# Patient Record
Sex: FEMALE | Race: WHITE | Employment: FULL TIME | URBAN - NONMETROPOLITAN AREA
[De-identification: names, ages, dates, MRNs, and addresses within clinical notes are randomized per-mention and may not be internally consistent; named-entity substitution may affect disease eponyms.]

---

## 2017-11-09 PROBLEM — M17.12 PRIMARY OSTEOARTHRITIS OF LEFT KNEE: Status: ACTIVE | Noted: 2017-11-09

## 2017-11-17 PROBLEM — Z96.652 STATUS POST LEFT KNEE REPLACEMENT: Status: ACTIVE | Noted: 2017-11-17

## 2017-11-17 PROBLEM — M17.12 OSTEOARTHRITIS OF LEFT KNEE: Status: ACTIVE | Noted: 2017-11-17

## 2017-11-17 PROBLEM — M17.12 PRIMARY OSTEOARTHRITIS OF LEFT KNEE: Status: RESOLVED | Noted: 2017-11-09 | Resolved: 2017-11-17

## 2018-09-28 ENCOUNTER — OFFICE VISIT (OUTPATIENT)
Dept: SURGERY | Age: 73
End: 2018-09-28
Payer: MEDICARE

## 2018-09-28 VITALS
WEIGHT: 154 LBS | TEMPERATURE: 98.3 F | HEART RATE: 74 BPM | HEIGHT: 64 IN | DIASTOLIC BLOOD PRESSURE: 88 MMHG | BODY MASS INDEX: 26.29 KG/M2 | OXYGEN SATURATION: 98 % | SYSTOLIC BLOOD PRESSURE: 175 MMHG

## 2018-09-28 DIAGNOSIS — R14.0 ABDOMINAL BLOATING WITH CRAMPS: Primary | ICD-10-CM

## 2018-09-28 DIAGNOSIS — R10.9 ABDOMINAL BLOATING WITH CRAMPS: Primary | ICD-10-CM

## 2018-09-28 PROCEDURE — 99204 OFFICE O/P NEW MOD 45 MIN: CPT | Performed by: SURGERY

## 2018-09-28 RX ORDER — VENLAFAXINE HYDROCHLORIDE 37.5 MG/1
37.5 CAPSULE, EXTENDED RELEASE ORAL DAILY
COMMUNITY
End: 2019-10-10 | Stop reason: SDUPTHER

## 2018-09-28 RX ORDER — LEVOTHYROXINE SODIUM 0.12 MG/1
125 TABLET ORAL DAILY
COMMUNITY
End: 2019-08-29 | Stop reason: SDUPTHER

## 2018-09-28 RX ORDER — PILOCARPINE HYDROCHLORIDE 7.5 MG/1
7.5 TABLET, FILM COATED ORAL 3 TIMES DAILY
COMMUNITY
End: 2019-12-13 | Stop reason: SDUPTHER

## 2018-09-28 NOTE — PROGRESS NOTES
morning. Once about a few months ago, she woke up with loose stool in underwear and was not sure if that was isolated episode or not. She says sometimes when she has to urinate, She feels like she does not have any feeling while she is going. She denies any melena or hematochezia in her stools. In the last few months since this started, She had a weight loss about 10 pounds. There is no family history of Crohn's or ulcerative colitis. She had aMaternal aunt with colon cancer in her 76s. She is a local . .       Past Medical History:   Diagnosis Date    Arrhythmia     Atrial Fibrillation , Reentrant AV elfego tachycardia, radiofrequency ablation 1999 Tammy Vargas  / follow with Dr. Wilmer Owusu every year    AVNRT (AV elfego re-entry tachycardia) ([facility] [address]     s/p ablation Tammy Vargas    Dry eyes     Dry mouth     GERD (gastroesophageal reflux disease)     Glaucoma     left eyes    Hypothyroidism     Osteoarthritis     Sjogren's syndrome ([facility] [address]     Spinal stenosis     at lower back       Past Surgical History:   Procedure Laterality Date    ABLATION OF DYSRHYTHMIC FOCUS      APPENDECTOMY  ?   SECTION      x 2    COLONOSCOPY      JOINT REPLACEMENT Left 2017    total knee       Prior to Admission medications    Medication Sig Start Date End Date Taking?  Authorizing Provider   venlafaxine (EFFEXOR XR) 37.5 MG extended release capsule Take 37.5 mg by mouth daily   Yes Historical Provider, MD   metoprolol tartrate (LOPRESSOR) 25 MG tablet Take 25 mg by mouth 2 times daily Instructed to take with sip water am of procedure   taking for heart per patient   Yes Historical Provider, MD   Calcium Carb-Cholecalciferol (CALCIUM 1000 + D PO) Take 4 tablets by mouth 2 times daily LD 2017   Yes Historical Provider, MD   Biotin (BIOTIN 5000) 5 MG CAPS Take by mouth daily LD 2017   Yes Historical Provider, MD   Omega-3 Fatty Acids (OMEGA-3 FISH OIL PO) Take 1 capsule by Mother     Heart Disease Father     No Known Problems Brother        Review of Systems   All other systems reviewed and are negative. Objective:  Vitals:    09/28/18 0906   BP: (!) 175/88   Site: Right Upper Arm   Position: Sitting   Cuff Size: Medium Adult   Pulse: 74   Temp: 98.3 °F (36.8 °C)   TempSrc: Oral   SpO2: 98%   Weight: 154 lb (69.9 kg)   Height: 5' 4\" (1.626 m)          Physical Exam   Constitutional: She is oriented to person, place, and time and well-developed, well-nourished, and in no distress. HENT:   Head: Normocephalic and atraumatic. Eyes: Right eye exhibits no discharge. Left eye exhibits no discharge. Neck: No tracheal deviation present. Cardiovascular: Normal rate. Pulmonary/Chest: Effort normal. No respiratory distress. Abdominal: Soft. She exhibits no distension. There is no tenderness. There is no rebound and no guarding. Neurological: She is alert and oriented to person, place, and time. Skin: Skin is warm and dry. Ulices Pedroza MD  9/28/2018    NOTE: This report, in part or full, may have been transcribed using voice recognition software. Every effort was made to ensure accuracy; however, inadvertent computerized transcription errors may be present. Please excuse any transcriptional grammatical or spelling errors that may have escaped my editorial review.

## 2018-09-28 NOTE — PATIENT INSTRUCTIONS
wants you to do.     · Your doctor will tell you which medicines to take or stop before your procedure. You may need to stop taking certain medicines a week or more before the procedure. So talk to your doctor as soon as you can.     · If you have an advance directive, let your doctor know. It may include a living will and a durable power of  for health care. Bring a copy to the hospital. If you don't have one, you may want to prepare one. It lets your doctor and loved ones know your health care wishes. Doctors advise that everyone prepare these papers before any type of surgery or procedure. Procedures can be stressful. This information will help you understand what you can expect. And it will help you safely prepare for your procedure. What happens on the day of the procedure? · Follow the instructions exactly about when to stop eating and drinking. If you don't, your procedure may be canceled. If your doctor told you to take your medicines on the day of the procedure, take them with only a sip of water.     · Take a bath or shower before you come in for your procedure. Do not apply lotions, perfumes, deodorants, or nail polish.     · Take off all jewelry and piercings. And take out contact lenses, if you wear them.    At the hospital or surgery center   · Bring a picture ID.     · The test may take 15 to 30 minutes.     · The doctor may spray medicine on the back of your throat to numb it. You also will get medicine to prevent pain and to relax you.     · You will lie on your left side. The doctor will put the scope in your mouth and toward the back of your throat. The doctor will tell you when to swallow. This helps the scope move down your throat. You will be able to breathe normally. The doctor will move the scope down your esophagus into your stomach.  The doctor also may look at the duodenum.     · If your doctor wants to take a sample of tissue for a biopsy, he or she may use small surgical tools, which are put into the scope, to cut off some tissue. You will not feel a biopsy, if one is taken. The doctor also can use the tools to stop bleeding or to do other treatments, if needed.     · You will stay at the hospital or surgery center for 1 to 2 hours until the medicine you were given wears off. Going home   · Be sure you have someone to drive you home. Anesthesia and pain medicine make it unsafe for you to drive.     · You will be given more specific instructions about recovering from your procedure. They will cover things like diet, wound care, follow-up care, driving, and getting back to your normal routine. When should you call your doctor? · You have questions or concerns.     · You don't understand how to prepare for your procedure.     · You become ill before the procedure (such as fever, flu, or a cold).     · You need to reschedule or have changed your mind about having the procedure. Where can you learn more? Go to [email or web address] and sign in to your ContestMachine account. Enter P790 in the InteliCloud box to learn more about \"Upper GI Endoscopy: Before Your Procedure. \"     If you do not have an account, please click on the \"Sign Up Now\" link. Current as of: May 12, 2017  Content Version: 11.7  © 0775-6948 [employer]. Care instructions adapted under license by [facility] ([facility]). If you have questions about a medical condition or this instruction, always ask your healthcare professional. [place] [address] any warranty or liability for your use of this information. Patient Education        Colonoscopy: Before Your Procedure  What is a colonoscopy? A colonoscopy is a test that lets a doctor look inside your colon. The doctor uses a thin, lighted tube called a colonoscope to look for problems. These include small growths called polyps, cancer, or bleeding.   During the test, the doctor can take samples of tissue that can longer. It depends on what is found and what is done. Going home   · Be sure you have someone to drive you home. Anesthesia and pain medicine make it unsafe for you to drive.     · You will be given more specific instructions about recovering from your procedure. When should you call your doctor? · You have questions or concerns.     · You don't understand how to prepare for your procedure.     · You are having trouble with the bowel prep.     · You become ill before the procedure (such as fever, flu, or a cold).     · You need to reschedule or have changed your mind about having the procedure. Where can you learn more? Go to [email or web address].[email or web address] org and sign in to your Antengo account. Enter C315 in the Qyer.com box to learn more about \"Colonoscopy: Before Your Procedure. \"     If you do not have an account, please click on the \"Sign Up Now\" link. Current as of: May 12, 2017  Content Version: 11.7  © 7080-0388 [employer]. Care instructions adapted under license by [facility] ([facility]). If you have questions about a medical condition or this instruction, always ask your healthcare professional. [place] [address] any warranty or liability for your use of this information. Patient Education        Gas and Bloating: Care Instructions  Your Care Instructions    Gas and bloating can be uncomfortable and embarrassing problems. All people pass gas, but some people produce more gas than others, sometimes enough to cause distress. It is normal to pass gas from 6 to 20 times per day. Excess gas usually is not caused by a serious health problem. Gas and bloating usually are caused by something you eat or drink, including some food supplements and medicines. Gas and bloating are usually harmless and go away without treatment. However, changing your diet can help end the problem.  Some over-the-counter medicines can help prevent gas and relieve

## 2018-10-02 ENCOUNTER — TELEPHONE (OUTPATIENT)
Dept: SURGERY | Age: 73
End: 2018-10-02

## 2018-10-05 ENCOUNTER — ANESTHESIA (OUTPATIENT)
Dept: ENDOSCOPY | Age: 73
End: 2018-10-05
Payer: MEDICARE

## 2018-10-05 ENCOUNTER — ANESTHESIA EVENT (OUTPATIENT)
Dept: ENDOSCOPY | Age: 73
End: 2018-10-05
Payer: MEDICARE

## 2018-10-05 ENCOUNTER — HOSPITAL ENCOUNTER (OUTPATIENT)
Age: 73
Setting detail: OUTPATIENT SURGERY
Discharge: HOME | End: 2018-10-05
Attending: SURGERY | Admitting: SURGERY
Payer: MEDICARE

## 2018-10-05 VITALS — OXYGEN SATURATION: 97 % | SYSTOLIC BLOOD PRESSURE: 130 MMHG | DIASTOLIC BLOOD PRESSURE: 76 MMHG

## 2018-10-05 VITALS
OXYGEN SATURATION: 99 % | DIASTOLIC BLOOD PRESSURE: 70 MMHG | SYSTOLIC BLOOD PRESSURE: 158 MMHG | HEIGHT: 64 IN | RESPIRATION RATE: 18 BRPM | TEMPERATURE: 97.3 F | WEIGHT: 153 LBS | BODY MASS INDEX: 26.12 KG/M2 | HEART RATE: 60 BPM

## 2018-10-05 PROCEDURE — 7100000010 HC PHASE II RECOVERY - FIRST 15 MIN: Performed by: SURGERY

## 2018-10-05 PROCEDURE — 6360000002 HC RX W HCPCS: Performed by: NURSE ANESTHETIST, CERTIFIED REGISTERED

## 2018-10-05 PROCEDURE — 45380 COLONOSCOPY AND BIOPSY: CPT | Performed by: SURGERY

## 2018-10-05 PROCEDURE — 43239 EGD BIOPSY SINGLE/MULTIPLE: CPT | Performed by: SURGERY

## 2018-10-05 PROCEDURE — 3609010300 HC COLONOSCOPY W/BIOPSY SINGLE/MULTIPLE: Performed by: SURGERY

## 2018-10-05 PROCEDURE — 2709999900 HC NON-CHARGEABLE SUPPLY: Performed by: SURGERY

## 2018-10-05 PROCEDURE — 3700000001 HC ADD 15 MINUTES (ANESTHESIA): Performed by: SURGERY

## 2018-10-05 PROCEDURE — 88305 TISSUE EXAM BY PATHOLOGIST: CPT

## 2018-10-05 PROCEDURE — 88342 IMHCHEM/IMCYTCHM 1ST ANTB: CPT

## 2018-10-05 PROCEDURE — 7100000011 HC PHASE II RECOVERY - ADDTL 15 MIN: Performed by: SURGERY

## 2018-10-05 PROCEDURE — 2580000003 HC RX 258: Performed by: NURSE ANESTHETIST, CERTIFIED REGISTERED

## 2018-10-05 PROCEDURE — 3700000000 HC ANESTHESIA ATTENDED CARE: Performed by: SURGERY

## 2018-10-05 PROCEDURE — 3609012400 HC EGD TRANSORAL BIOPSY SINGLE/MULTIPLE: Performed by: SURGERY

## 2018-10-05 RX ORDER — SODIUM CHLORIDE 9 MG/ML
INJECTION, SOLUTION INTRAVENOUS CONTINUOUS PRN
Status: DISCONTINUED | OUTPATIENT
Start: 2018-10-05 | End: 2018-10-05 | Stop reason: SDUPTHER

## 2018-10-05 RX ORDER — PROPOFOL 10 MG/ML
INJECTION, EMULSION INTRAVENOUS CONTINUOUS PRN
Status: DISCONTINUED | OUTPATIENT
Start: 2018-10-05 | End: 2018-10-05 | Stop reason: SDUPTHER

## 2018-10-05 RX ORDER — 0.9 % SODIUM CHLORIDE 0.9 %
10 VIAL (ML) INJECTION PRN
Status: DISCONTINUED | OUTPATIENT
Start: 2018-10-05 | End: 2018-10-05 | Stop reason: HOSPADM

## 2018-10-05 RX ORDER — 0.9 % SODIUM CHLORIDE 0.9 %
10 VIAL (ML) INJECTION EVERY 12 HOURS SCHEDULED
Status: DISCONTINUED | OUTPATIENT
Start: 2018-10-05 | End: 2018-10-05 | Stop reason: HOSPADM

## 2018-10-05 RX ORDER — FENTANYL CITRATE 50 UG/ML
INJECTION, SOLUTION INTRAMUSCULAR; INTRAVENOUS PRN
Status: DISCONTINUED | OUTPATIENT
Start: 2018-10-05 | End: 2018-10-05 | Stop reason: SDUPTHER

## 2018-10-05 RX ADMIN — FENTANYL CITRATE 50 MCG: 50 INJECTION, SOLUTION INTRAMUSCULAR; INTRAVENOUS at 12:43

## 2018-10-05 RX ADMIN — FENTANYL CITRATE 50 MCG: 50 INJECTION, SOLUTION INTRAMUSCULAR; INTRAVENOUS at 12:49

## 2018-10-05 RX ADMIN — SODIUM CHLORIDE: 9 INJECTION, SOLUTION INTRAVENOUS at 12:27

## 2018-10-05 RX ADMIN — PROPOFOL 75 MCG/KG/MIN: 10 INJECTION, EMULSION INTRAVENOUS at 12:43

## 2018-10-05 ASSESSMENT — PAIN SCALES - GENERAL
PAINLEVEL_OUTOF10: 2
PAINLEVEL_OUTOF10: 0
PAINLEVEL_OUTOF10: 1

## 2018-10-05 ASSESSMENT — PAIN - FUNCTIONAL ASSESSMENT: PAIN_FUNCTIONAL_ASSESSMENT: 0-10

## 2018-10-05 NOTE — OP NOTE
EGD/Colonoscopy Op Note    DATE OF PROCEDURE: 10/5/2018     SURGEON: Rachel Hinton MD    PREOPERATIVE DIAGNOSIS: GERD, Bloating    POSTOPERATIVE DIAGNOSIS: Same, mild gastroduodenitis, small sliding hiatal hernia    OPERATION: Procedure(s):  EGD BIOPSY  COLONOSCOPY DIAGNOSTIC    ANESTHESIA: Local monitored anesthesia. ESTIMATED BLOOD LOSS: minimal    COMPLICATIONS: None. SPECIMENS:    ID Type Source Tests Collected by Time Destination   A : duodenal bx Tissue Stomach SURGICAL PATHOLOGY Rachel Hinton MD 10/5/2018 1244    B : antral bx Tissue Stomach SURGICAL PATHOLOGY Rachel Hinton MD 10/5/2018 1246    C : bx fundal gland polyp Tissue Stomach SURGICAL PATHOLOGY Rachel Hinton MD 10/5/2018 1247    D : random esoph bxs Tissue Stomach SURGICAL PATHOLOGY Rachel Hinton MD 10/5/2018 1252    E : bxs terminal ileum Tissue Colon SURGICAL PATHOLOGY Rachel Hinton MD 10/5/2018 1304    F : random colon bxs Tissue Colon SURGICAL PATHOLOGY Rachel Hinton MD 10/5/2018 1306        HISTORY: The patient is a 68y.o. year old female with history of above preop diagnosis. I recommended esophagogastroduodenoscopy and colonoscopy with possible biopsy/polypectomy and I explained the risk, benefits, expected outcome, and alternatives to the procedure. Risks included but are not limited to bleeding, infection, respiratory distress, hypotension, and perforation. Patient understands and is in agreement. PROCEDURE: The patient was given IV conscious sedation per anesthesia. The patient was given supplemental oxygen by nasal cannula. The gastroscope was inserted orally and advanced under direct vision through the esophagus, through the stomach, through the pylorus, and into the duodenum.       Findings:  Duodenum:     Descending: normal, bx r/o sprue    Bulb: mild duodenitis    Stomach:    Antrum: mild gastritis s/p biopsy    Body: mild gastritis    Fundus: several benign appearing fundic gland polyps - bx x 2 representative polyps    Esophagus: small sliding hiatal hernia, GERD    Larynx: not examined    The scope was removed and the patient tolerated the procedure well. The colonoscope was inserted per rectum and advanced under direct vision to the cecum without difficulty, identified by appendix, valve, light reflex. The prep was good so exam was adequate. FINDINGS:    FORREST: normal    Terminal Ileum: normal, s/p bx for symptoms    Cecum/Ascending colon: normal    Transverse colon: normal    Descending/Sigmoid colon: normal    Rectum/Anus: examined in normal and retroflexed positions and was mild hemorrhoids    Random biopsies taken throughout due to symptoms    The colon was decompressed and the scope was removed. The withdraw time was approximately 13 minutes. The patient tolerated the procedure well. ASSESSMENT/PLAN:   1. F/u biopsies  2. Cont ppi  3.  Colorectal Cancer Screening - recommend repeat colonoscopy in 10 years    Rachel Hinton MD  10/05/18  1:15 PM

## 2018-10-05 NOTE — H&P
Luis Felipe Louis MD Physician Signed   Progress Notes Encounter Date: 9/28/2018   Related encounter: Office Visit from 9/28/2018 in [address] Collapse All    []Manual[]Template  []Copied     General Surgery Clinic Note     Assessment/Plan:        Diagnosis Orders   1. Abdominal bloating with cramps        Schedule EGD/ Colonoscopy. ? related to Sjogren? Discussed dietary changes and Low FODMAP diet. CMN Jeffy Madsen.            Return for endoscopy.             Chief Complaint   Patient presents with    New Patient       new patient from Dr Zeynep Landaverde for abdominal pain/cramping and bloating. she has had this for several months    Weight Loss       she has has a 10 pound weight loss of the pass couple months         HPI: Delmi Jaime is a 71 y.o. female who presents in consultation for lower abdominal cramping and bloating. This been going on for several months. She describes it mostly as gas type pain. Does get better with Gas-X. When she uses it. She says it is worse with ham and chicken. 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 Fatty foods does not have any impact on this. It is worse with milk, which she avoids and takes pulmonary no. Does not have any effect with cheese. She does be worse when she sits for prolonged periods of time. Symptoms of sometimes go to the right flank. She has had nerve problems. This feels different than a pinched nerve, which she has occasionally. She had a colonoscopy more than 10 years ago, which she says was negative except for the told her she have an adhesion it seemed like. She denies any polyps previously. She also had an EGD about 10 years ago for spasms, which was negative.  She does have a history of Sjogren's syndrome, which we discussed not sure if her current symptoms are a GI manifestation of that at this time or

## 2018-10-05 NOTE — ANESTHESIA PRE PROCEDURE
CALCIUM 8.1 11/19/2017       POC Tests: No results for input(s): POCGLU, POCNA, POCK, POCCL, POCBUN, POCHEMO, POCHCT in the last 72 hours. Coags: No results found for: PROTIME, INR, APTT    HCG (If Applicable): No results found for: PREGTESTUR, PREGSERUM, HCG, HCGQUANT     ABGs: No results found for: PHART, PO2ART, CBD6GUE, RVZ0QGW, BEART, I2RFKTHY     Type & Screen (If Applicable):  No results found for: LABABO, [address]    Anesthesia Evaluation  Patient summary reviewed and Nursing notes reviewed no history of anesthetic complications:   Airway: Mallampati: II  TM distance: >3 FB   Neck ROM: full  Mouth opening: > = 3 FB Dental: normal exam         Pulmonary:Negative Pulmonary ROS breath sounds clear to auscultation                             Cardiovascular:  Exercise tolerance: good (>4 METS),           Rhythm: regular  Rate: normal           Beta Blocker:  Dose within 24 Hrs      ROS comment: Re-entrant tachycardia     Neuro/Psych:   Negative Neuro/Psych ROS              GI/Hepatic/Renal:   (+) GERD: well controlled,           Endo/Other:    (+) hypothyroidism: arthritis:., .                  ROS comment: Sjogren's Abdominal:           Vascular: negative vascular ROS. Anesthesia Plan      MAC     ASA 3       Induction: intravenous. Anesthetic plan and risks discussed with patient.       Plan discussed with CRNA and surgical team.                  Partha Pacheco MD   10/5/2018

## 2018-10-18 ENCOUNTER — TELEPHONE (OUTPATIENT)
Dept: SURGERY | Age: 73
End: 2018-10-18

## 2018-10-18 NOTE — TELEPHONE ENCOUNTER
Pt cancelled appt on 10/19/18, stating Dr. Ankit Cobb advised pt she does not need a repeat colonoscopy for 10 years and due to the fact that she has other appts, pt would like to cancel appt; msg routed, for informational purposes, to [address] for Dr. Ankit Cobb.    Electronically signed by Itz Burt on 10/18/18 at 9:48 AM

## 2018-10-26 ENCOUNTER — OFFICE VISIT (OUTPATIENT)
Dept: CARDIOLOGY CLINIC | Age: 73
End: 2018-10-26
Payer: MEDICARE

## 2018-10-26 VITALS
RESPIRATION RATE: 16 BRPM | HEIGHT: 64 IN | BODY MASS INDEX: 26.87 KG/M2 | DIASTOLIC BLOOD PRESSURE: 70 MMHG | HEART RATE: 57 BPM | SYSTOLIC BLOOD PRESSURE: 120 MMHG | WEIGHT: 157.4 LBS

## 2018-10-26 DIAGNOSIS — I47.1 AVNRT (AV NODAL RE-ENTRY TACHYCARDIA) (HCC): Primary | ICD-10-CM

## 2018-10-26 PROCEDURE — 93000 ELECTROCARDIOGRAM COMPLETE: CPT | Performed by: INTERNAL MEDICINE

## 2018-10-26 PROCEDURE — 99214 OFFICE O/P EST MOD 30 MIN: CPT | Performed by: INTERNAL MEDICINE

## 2018-10-26 RX ORDER — CEVIMELINE HYDROCHLORIDE 30 MG/1
30 CAPSULE ORAL 3 TIMES DAILY
COMMUNITY
End: 2019-11-26

## 2018-11-16 ENCOUNTER — HOSPITAL ENCOUNTER (OUTPATIENT)
Dept: GENERAL RADIOLOGY | Age: 73
Discharge: HOME | End: 2018-11-18
Payer: MEDICARE

## 2018-11-16 DIAGNOSIS — Z13.9 VISIT FOR SCREENING: ICD-10-CM

## 2018-11-16 DIAGNOSIS — Z13.820 SCREENING FOR OSTEOPOROSIS: ICD-10-CM

## 2018-11-16 PROCEDURE — 77063 BREAST TOMOSYNTHESIS BI: CPT

## 2018-11-16 PROCEDURE — 77080 DXA BONE DENSITY AXIAL: CPT

## 2019-01-24 DIAGNOSIS — M81.0 SENILE OSTEOPOROSIS: ICD-10-CM

## 2019-01-24 RX ORDER — 0.9 % SODIUM CHLORIDE 0.9 %
250 INTRAVENOUS SOLUTION INTRAVENOUS ONCE
Status: CANCELLED | OUTPATIENT
Start: 2019-01-24 | End: 2019-01-24

## 2019-01-24 RX ORDER — DIPHENHYDRAMINE HYDROCHLORIDE 50 MG/ML
50 INJECTION INTRAMUSCULAR; INTRAVENOUS ONCE
Status: CANCELLED | OUTPATIENT
Start: 2019-01-24 | End: 2019-01-24

## 2019-01-24 RX ORDER — SODIUM CHLORIDE 9 MG/ML
INJECTION, SOLUTION INTRAVENOUS CONTINUOUS
Status: CANCELLED | OUTPATIENT
Start: 2019-01-24

## 2019-01-24 RX ORDER — SODIUM CHLORIDE 9 MG/ML
INJECTION, SOLUTION INTRAVENOUS ONCE
Status: CANCELLED | OUTPATIENT
Start: 2019-01-24 | End: 2019-01-24

## 2019-01-24 RX ORDER — 0.9 % SODIUM CHLORIDE 0.9 %
10 VIAL (ML) INJECTION ONCE
Status: CANCELLED | OUTPATIENT
Start: 2019-01-24 | End: 2019-01-24

## 2019-01-24 RX ORDER — SODIUM CHLORIDE 0.9 % (FLUSH) 0.9 %
10 SYRINGE (ML) INJECTION PRN
Status: CANCELLED | OUTPATIENT
Start: 2019-01-24

## 2019-01-24 RX ORDER — ZOLEDRONIC ACID 5 MG/100ML
5 INJECTION, SOLUTION INTRAVENOUS ONCE
Status: CANCELLED | OUTPATIENT
Start: 2019-01-24 | End: 2019-01-24

## 2019-01-24 RX ORDER — SODIUM CHLORIDE 0.9 % (FLUSH) 0.9 %
5 SYRINGE (ML) INJECTION PRN
Status: CANCELLED | OUTPATIENT
Start: 2019-01-24

## 2019-01-24 RX ORDER — EPINEPHRINE 1 MG/ML
0.3 INJECTION, SOLUTION, CONCENTRATE INTRAVENOUS PRN
Status: CANCELLED | OUTPATIENT
Start: 2019-01-24

## 2019-01-24 RX ORDER — METHYLPREDNISOLONE SODIUM SUCCINATE 125 MG/2ML
125 INJECTION, POWDER, LYOPHILIZED, FOR SOLUTION INTRAMUSCULAR; INTRAVENOUS ONCE
Status: CANCELLED | OUTPATIENT
Start: 2019-01-24 | End: 2019-01-24

## 2019-01-24 RX ORDER — 0.9 % SODIUM CHLORIDE 0.9 %
500 INTRAVENOUS SOLUTION INTRAVENOUS ONCE
Status: CANCELLED | OUTPATIENT
Start: 2019-01-24 | End: 2019-01-24

## 2019-01-24 RX ORDER — HEPARIN SODIUM (PORCINE) LOCK FLUSH IV SOLN 100 UNIT/ML 100 UNIT/ML
500 SOLUTION INTRAVENOUS PRN
Status: CANCELLED | OUTPATIENT
Start: 2019-01-24

## 2019-02-07 NOTE — PROGRESS NOTES
Received a referral for Reclast along with labs. Called to hospital Pharmacist Sandra WHITESIDE to have creatinine clearance calculated. Patient is 68years old, height 5'4\", weight 159lb , gender female, creatinine level 0.84. Pharmacist did calculation and I was told creatinine clearance is 59.

## 2019-02-08 ENCOUNTER — HOSPITAL ENCOUNTER (OUTPATIENT)
Dept: INFUSION THERAPY | Age: 74
Setting detail: INFUSION SERIES
Discharge: HOME | End: 2019-02-08
Payer: MEDICARE

## 2019-02-15 ENCOUNTER — HOSPITAL ENCOUNTER (OUTPATIENT)
Dept: INFUSION THERAPY | Age: 74
Setting detail: INFUSION SERIES
Discharge: HOME | End: 2019-02-15
Payer: MEDICARE

## 2019-02-15 VITALS
HEIGHT: 64 IN | DIASTOLIC BLOOD PRESSURE: 67 MMHG | BODY MASS INDEX: 27.14 KG/M2 | SYSTOLIC BLOOD PRESSURE: 139 MMHG | TEMPERATURE: 98.6 F | WEIGHT: 159 LBS | RESPIRATION RATE: 16 BRPM | HEART RATE: 62 BPM | OXYGEN SATURATION: 97 %

## 2019-02-15 DIAGNOSIS — M81.0 SENILE OSTEOPOROSIS: ICD-10-CM

## 2019-02-15 PROCEDURE — 2580000003 HC RX 258: Performed by: INTERNAL MEDICINE

## 2019-02-15 PROCEDURE — 96365 THER/PROPH/DIAG IV INF INIT: CPT

## 2019-02-15 PROCEDURE — 6360000002 HC RX W HCPCS: Performed by: INTERNAL MEDICINE

## 2019-02-15 RX ORDER — SODIUM CHLORIDE 9 MG/ML
INJECTION, SOLUTION INTRAVENOUS ONCE
Status: CANCELLED | OUTPATIENT
Start: 2019-02-15 | End: 2019-02-15

## 2019-02-15 RX ORDER — ZOLEDRONIC ACID 5 MG/100ML
5 INJECTION, SOLUTION INTRAVENOUS ONCE
Status: COMPLETED | OUTPATIENT
Start: 2019-02-15 | End: 2019-02-15

## 2019-02-15 RX ORDER — EPINEPHRINE 1 MG/ML
0.3 INJECTION, SOLUTION, CONCENTRATE INTRAVENOUS PRN
Status: CANCELLED | OUTPATIENT
Start: 2019-02-15

## 2019-02-15 RX ORDER — METHYLPREDNISOLONE SODIUM SUCCINATE 125 MG/2ML
125 INJECTION, POWDER, LYOPHILIZED, FOR SOLUTION INTRAMUSCULAR; INTRAVENOUS ONCE
Status: CANCELLED | OUTPATIENT
Start: 2019-02-15 | End: 2019-02-15

## 2019-02-15 RX ORDER — DIPHENHYDRAMINE HYDROCHLORIDE 50 MG/ML
50 INJECTION INTRAMUSCULAR; INTRAVENOUS ONCE
Status: CANCELLED | OUTPATIENT
Start: 2019-02-15 | End: 2019-02-15

## 2019-02-15 RX ORDER — SODIUM CHLORIDE 9 MG/ML
INJECTION, SOLUTION INTRAVENOUS ONCE
Status: COMPLETED | OUTPATIENT
Start: 2019-02-15 | End: 2019-02-15

## 2019-02-15 RX ORDER — SODIUM CHLORIDE 0.9 % (FLUSH) 0.9 %
10 SYRINGE (ML) INJECTION PRN
Status: CANCELLED | OUTPATIENT
Start: 2019-02-15

## 2019-02-15 RX ORDER — SODIUM CHLORIDE 9 MG/ML
INJECTION, SOLUTION INTRAVENOUS CONTINUOUS
Status: CANCELLED | OUTPATIENT
Start: 2019-02-15

## 2019-02-15 RX ORDER — ZOLEDRONIC ACID 5 MG/100ML
5 INJECTION, SOLUTION INTRAVENOUS ONCE
Status: CANCELLED | OUTPATIENT
Start: 2019-02-15 | End: 2019-02-15

## 2019-02-15 RX ORDER — SODIUM CHLORIDE 0.9 % (FLUSH) 0.9 %
5 SYRINGE (ML) INJECTION PRN
Status: CANCELLED | OUTPATIENT
Start: 2019-02-15

## 2019-02-15 RX ORDER — SODIUM CHLORIDE 0.9 % (FLUSH) 0.9 %
10 SYRINGE (ML) INJECTION PRN
Status: DISCONTINUED | OUTPATIENT
Start: 2019-02-15 | End: 2019-02-16 | Stop reason: HOSPADM

## 2019-02-15 RX ORDER — HEPARIN SODIUM (PORCINE) LOCK FLUSH IV SOLN 100 UNIT/ML 100 UNIT/ML
500 SOLUTION INTRAVENOUS PRN
Status: CANCELLED | OUTPATIENT
Start: 2019-02-15

## 2019-02-15 RX ORDER — 0.9 % SODIUM CHLORIDE 0.9 %
10 VIAL (ML) INJECTION ONCE
Status: CANCELLED | OUTPATIENT
Start: 2019-02-15 | End: 2019-02-15

## 2019-02-15 RX ADMIN — ZOLEDRONIC ACID 5 MG: 5 INJECTION, SOLUTION INTRAVENOUS at 14:08

## 2019-02-15 RX ADMIN — SODIUM CHLORIDE: 9 INJECTION, SOLUTION INTRAVENOUS at 14:03

## 2019-06-05 ENCOUNTER — OFFICE VISIT (OUTPATIENT)
Dept: FAMILY MEDICINE CLINIC | Age: 74
End: 2019-06-05
Payer: MEDICARE

## 2019-06-05 VITALS
TEMPERATURE: 98.1 F | WEIGHT: 158 LBS | SYSTOLIC BLOOD PRESSURE: 135 MMHG | HEIGHT: 64 IN | DIASTOLIC BLOOD PRESSURE: 82 MMHG | BODY MASS INDEX: 26.98 KG/M2

## 2019-06-05 DIAGNOSIS — M54.50 ACUTE LEFT-SIDED LOW BACK PAIN WITHOUT SCIATICA: Primary | ICD-10-CM

## 2019-06-05 PROCEDURE — 99213 OFFICE O/P EST LOW 20 MIN: CPT | Performed by: FAMILY MEDICINE

## 2019-06-05 RX ORDER — BACLOFEN 10 MG/1
10 TABLET ORAL 3 TIMES DAILY
Qty: 30 TABLET | Refills: 0 | Status: SHIPPED | OUTPATIENT
Start: 2019-06-05 | End: 2019-07-23

## 2019-06-05 RX ORDER — PREDNISONE 10 MG/1
TABLET ORAL
Qty: 18 TABLET | Refills: 0 | Status: SHIPPED | OUTPATIENT
Start: 2019-06-05 | End: 2019-07-23

## 2019-06-05 ASSESSMENT — ENCOUNTER SYMPTOMS
RESPIRATORY NEGATIVE: 1
BACK PAIN: 1

## 2019-06-05 NOTE — PROGRESS NOTES
Fibrillation , Reentrant AV elfego tachycardia, radiofrequency ablation 1999 Pablo Mancia  / follow with Dr. Juliane Jimenez every year    AVNRT (AV elfego re-entry tachycardia) [facility]) ?    s/p ablation /resolved; noted 18- had recent fast heart rate & hypertensoion & seeing Dr. Emiliano Reagan  18    Dry eyes     Dry mouth     GERD (gastroesophageal reflux disease)     Glaucoma     left eyes    Hypothyroidism     Osteoarthritis     Sjogren's syndrome ([address]     Spinal stenosis     at lower back     Family History   Problem Relation Age of Onset    Arthritis Mother         rheumatoid    Osteoporosis Mother     Heart Disease Father     No Known Problems Brother       Past Surgical History:   Procedure Laterality Date    ABLATION OF DYSRHYTHMIC FOCUS      APPENDECTOMY  ?   SECTION      x 2    COLONOSCOPY      COLONOSCOPY N/A 10/5/2018    COLONOSCOPY WITH BIOPSY performed by Shawna Granger MD at [address] [facility] ENDOSCOPY, COLON, DIAGNOSTIC      JOINT REPLACEMENT Left 2017    total knee    UPPER GASTROINTESTINAL ENDOSCOPY N/A 10/5/2018    EGD BIOPSY performed by Shawna Granger MD at [facility] 12:    19 1309   BP: 135/82   Temp: 98.1 °F (36.7 °C)   Weight: 158 lb (71.7 kg)   Height: 5' 4\" (1.626 m)       Objective:    Physical Exam   Constitutional: She is oriented to person, place, and time. Cardiovascular: Normal rate and regular rhythm. Pulmonary/Chest: Effort normal and breath sounds normal.   Musculoskeletal:   Left para  vert spasm with  Tight muscle       Neurological: She is alert and oriented to person, place, and time. David Rucker was seen today for joint pain. Diagnoses and all orders for this visit:    Acute left-sided low back pain without sciatica  -     predniSONE (DELTASONE) 10 MG tablet; ONE TID FOR THREE DAYS, ONE BID FOR THREE DAYS, ONE QD FOR THREE DAYS   FOOD  -     baclofen (LIORESAL) 10 MG tablet;  Take 1 tablet by mouth 3 times daily Tired   Do not drive  -     XR PELVIS (1-2 VIEWS); Future  -     XR LUMBAR SPINE (MIN 4 VIEWS); Future  -     XR CHEST STANDARD (2 VW); Future  -     XR RIBS LEFT (2 VIEWS); Future        Comments: med   X ray I fnot better   Worse to er     A great deal of time spent reviewing medications, diet, exercise, social issues. Also reviewing the chart before entering the room with patient and finishing charting after leaving patient's room. More than half of that time was spent face to face with the patient in counseling and coordinating care. Follow Up: Return if symptoms worsen or fail to improve.      Seen by:  Fantasma Darling DO

## 2019-06-07 ENCOUNTER — TELEPHONE (OUTPATIENT)
Dept: FAMILY MEDICINE CLINIC | Age: 74
End: 2019-06-07

## 2019-06-07 DIAGNOSIS — E11.9 DIET-CONTROLLED DIABETES MELLITUS (HCC): Primary | ICD-10-CM

## 2019-06-07 DIAGNOSIS — E03.9 HYPOTHYROIDISM, UNSPECIFIED TYPE: ICD-10-CM

## 2019-06-07 DIAGNOSIS — M79.10 MYALGIA: ICD-10-CM

## 2019-06-07 DIAGNOSIS — E78.2 MIXED HYPERLIPIDEMIA: ICD-10-CM

## 2019-06-07 DIAGNOSIS — E55.9 VITAMIN D DEFICIENCY: ICD-10-CM

## 2019-06-11 ENCOUNTER — HOSPITAL ENCOUNTER (OUTPATIENT)
Age: 74
Discharge: HOME | End: 2019-06-13
Payer: MEDICARE

## 2019-06-11 DIAGNOSIS — E03.9 HYPOTHYROIDISM, UNSPECIFIED TYPE: ICD-10-CM

## 2019-06-11 DIAGNOSIS — E55.9 VITAMIN D DEFICIENCY: ICD-10-CM

## 2019-06-11 DIAGNOSIS — M79.10 MYALGIA: ICD-10-CM

## 2019-06-11 DIAGNOSIS — E11.9 DIET-CONTROLLED DIABETES MELLITUS (HCC): ICD-10-CM

## 2019-06-11 DIAGNOSIS — E78.2 MIXED HYPERLIPIDEMIA: ICD-10-CM

## 2019-06-11 LAB
ALBUMIN SERPL-MCNC: 4.7 G/DL (ref 3.5–5.2)
ALP BLD-CCNC: 53 U/L (ref 35–104)
ALT SERPL-CCNC: 11 U/L (ref 0–32)
ANION GAP SERPL CALCULATED.3IONS-SCNC: 15 MMOL/L (ref 7–16)
AST SERPL-CCNC: 15 U/L (ref 0–31)
BASOPHILS ABSOLUTE: 0.08 E9/L (ref 0–0.2)
BASOPHILS RELATIVE PERCENT: 1 % (ref 0–2)
BILIRUB SERPL-MCNC: 0.7 MG/DL (ref 0–1.2)
BUN BLDV-MCNC: 27 MG/DL (ref 8–23)
CALCIUM SERPL-MCNC: 9.8 MG/DL (ref 8.6–10.2)
CHLORIDE BLD-SCNC: 104 MMOL/L (ref 98–107)
CHOLESTEROL, TOTAL: 209 MG/DL (ref 0–199)
CO2: 24 MMOL/L (ref 22–29)
CREAT SERPL-MCNC: 0.8 MG/DL (ref 0.5–1)
EOSINOPHILS ABSOLUTE: 0.12 E9/L (ref 0.05–0.5)
EOSINOPHILS RELATIVE PERCENT: 1.6 % (ref 0–6)
GFR AFRICAN AMERICAN: >60
GFR NON-AFRICAN AMERICAN: >60 ML/MIN/1.73
GLUCOSE BLD-MCNC: 90 MG/DL (ref 74–99)
HBA1C MFR BLD: 5.5 % (ref 4–5.6)
HCT VFR BLD CALC: 44.6 % (ref 34–48)
HDLC SERPL-MCNC: 56 MG/DL
HEMOGLOBIN: 14.4 G/DL (ref 11.5–15.5)
IMMATURE GRANULOCYTES #: 0.02 E9/L
IMMATURE GRANULOCYTES %: 0.3 % (ref 0–5)
LDL CHOLESTEROL CALCULATED: 111 MG/DL (ref 0–99)
LYMPHOCYTES ABSOLUTE: 3.07 E9/L (ref 1.5–4)
LYMPHOCYTES RELATIVE PERCENT: 39.9 % (ref 20–42)
MCH RBC QN AUTO: 29.7 PG (ref 26–35)
MCHC RBC AUTO-ENTMCNC: 32.3 % (ref 32–34.5)
MCV RBC AUTO: 92 FL (ref 80–99.9)
MONOCYTES ABSOLUTE: 0.78 E9/L (ref 0.1–0.95)
MONOCYTES RELATIVE PERCENT: 10.1 % (ref 2–12)
NEUTROPHILS ABSOLUTE: 3.63 E9/L (ref 1.8–7.3)
NEUTROPHILS RELATIVE PERCENT: 47.1 % (ref 43–80)
PDW BLD-RTO: 13.2 FL (ref 11.5–15)
PLATELET # BLD: 262 E9/L (ref 130–450)
PMV BLD AUTO: 10.4 FL (ref 7–12)
POTASSIUM SERPL-SCNC: 5.4 MMOL/L (ref 3.5–5)
RBC # BLD: 4.85 E12/L (ref 3.5–5.5)
SEDIMENTATION RATE, ERYTHROCYTE: 3 MM/HR (ref 0–20)
SODIUM BLD-SCNC: 143 MMOL/L (ref 132–146)
T4 TOTAL: 10.9 MCG/DL (ref 4.5–11.7)
TOTAL PROTEIN: 7.5 G/DL (ref 6.4–8.3)
TRIGL SERPL-MCNC: 208 MG/DL (ref 0–149)
TSH SERPL DL<=0.05 MIU/L-ACNC: <0.005 UIU/ML (ref 0.27–4.2)
VITAMIN D 25-HYDROXY: 51 NG/ML (ref 30–100)
VLDLC SERPL CALC-MCNC: 42 MG/DL
WBC # BLD: 7.7 E9/L (ref 4.5–11.5)

## 2019-06-11 PROCEDURE — 85651 RBC SED RATE NONAUTOMATED: CPT

## 2019-06-11 PROCEDURE — 82306 VITAMIN D 25 HYDROXY: CPT

## 2019-06-11 PROCEDURE — 84436 ASSAY OF TOTAL THYROXINE: CPT

## 2019-06-11 PROCEDURE — 85025 COMPLETE CBC W/AUTO DIFF WBC: CPT

## 2019-06-11 PROCEDURE — 80053 COMPREHEN METABOLIC PANEL: CPT

## 2019-06-11 PROCEDURE — 84443 ASSAY THYROID STIM HORMONE: CPT

## 2019-06-11 PROCEDURE — 36415 COLL VENOUS BLD VENIPUNCTURE: CPT

## 2019-06-11 PROCEDURE — 80061 LIPID PANEL: CPT

## 2019-06-11 PROCEDURE — 83036 HEMOGLOBIN GLYCOSYLATED A1C: CPT

## 2019-06-14 ENCOUNTER — OFFICE VISIT (OUTPATIENT)
Dept: PRIMARY CARE CLINIC | Age: 74
End: 2019-06-14
Payer: MEDICARE

## 2019-06-14 VITALS
SYSTOLIC BLOOD PRESSURE: 128 MMHG | BODY MASS INDEX: 26.63 KG/M2 | OXYGEN SATURATION: 97 % | WEIGHT: 156 LBS | DIASTOLIC BLOOD PRESSURE: 83 MMHG | TEMPERATURE: 97.6 F | HEART RATE: 67 BPM | HEIGHT: 64 IN

## 2019-06-14 DIAGNOSIS — E78.2 MIXED HYPERLIPIDEMIA: ICD-10-CM

## 2019-06-14 DIAGNOSIS — J30.2 SEASONAL ALLERGIC RHINITIS, UNSPECIFIED TRIGGER: ICD-10-CM

## 2019-06-14 DIAGNOSIS — M35.00 H/O SJOGREN'S DISEASE (HCC): ICD-10-CM

## 2019-06-14 DIAGNOSIS — I10 ESSENTIAL HYPERTENSION: ICD-10-CM

## 2019-06-14 DIAGNOSIS — E11.9 DIET-CONTROLLED DIABETES MELLITUS (HCC): ICD-10-CM

## 2019-06-14 DIAGNOSIS — E55.9 VITAMIN D DEFICIENCY: ICD-10-CM

## 2019-06-14 DIAGNOSIS — K21.9 GASTROESOPHAGEAL REFLUX DISEASE WITHOUT ESOPHAGITIS: ICD-10-CM

## 2019-06-14 DIAGNOSIS — E03.9 ACQUIRED HYPOTHYROIDISM: Primary | ICD-10-CM

## 2019-06-14 PROCEDURE — 99214 OFFICE O/P EST MOD 30 MIN: CPT | Performed by: FAMILY MEDICINE

## 2019-06-14 RX ORDER — FLUTICASONE PROPIONATE 50 MCG
2 SPRAY, SUSPENSION (ML) NASAL
Qty: 1 BOTTLE | Refills: 5 | Status: SHIPPED | OUTPATIENT
Start: 2019-06-14 | End: 2020-01-08 | Stop reason: SDUPTHER

## 2019-06-14 ASSESSMENT — PATIENT HEALTH QUESTIONNAIRE - PHQ9
SUM OF ALL RESPONSES TO PHQ QUESTIONS 1-9: 0
SUM OF ALL RESPONSES TO PHQ9 QUESTIONS 1 & 2: 0
SUM OF ALL RESPONSES TO PHQ QUESTIONS 1-9: 0
2. FEELING DOWN, DEPRESSED OR HOPELESS: 0
1. LITTLE INTEREST OR PLEASURE IN DOING THINGS: 0

## 2019-06-14 ASSESSMENT — ENCOUNTER SYMPTOMS
BACK PAIN: 1
ALLERGIC/IMMUNOLOGIC NEGATIVE: 1
EYES NEGATIVE: 1
RESPIRATORY NEGATIVE: 1
GASTROINTESTINAL NEGATIVE: 1
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
## 2019-06-28 RX ORDER — METOPROLOL TARTRATE 50 MG/1
25 TABLET, FILM COATED ORAL 2 TIMES DAILY
Qty: 30 TABLET | Refills: 3 | Status: SHIPPED | OUTPATIENT
Start: 2019-06-28 | End: 2019-10-29 | Stop reason: SDUPTHER

## 2019-06-28 RX ORDER — GABAPENTIN 100 MG/1
200 CAPSULE ORAL 3 TIMES DAILY
Qty: 180 CAPSULE | Refills: 3 | Status: SHIPPED | OUTPATIENT
Start: 2019-06-28 | End: 2019-12-05 | Stop reason: SDUPTHER

## 2019-07-23 ENCOUNTER — HOSPITAL ENCOUNTER (OUTPATIENT)
Age: 74
Discharge: HOME | End: 2019-07-25
Payer: MEDICARE

## 2019-07-23 ENCOUNTER — OFFICE VISIT (OUTPATIENT)
Dept: RHEUMATOLOGY | Age: 74
End: 2019-07-23
Payer: MEDICARE

## 2019-07-23 VITALS
OXYGEN SATURATION: 96 % | DIASTOLIC BLOOD PRESSURE: 76 MMHG | HEART RATE: 73 BPM | WEIGHT: 159 LBS | TEMPERATURE: 98.2 F | BODY MASS INDEX: 27.14 KG/M2 | SYSTOLIC BLOOD PRESSURE: 126 MMHG | HEIGHT: 64 IN

## 2019-07-23 DIAGNOSIS — E87.5 HYPERKALEMIA: ICD-10-CM

## 2019-07-23 DIAGNOSIS — M35.01 SJOGREN'S SYNDROME WITH KERATOCONJUNCTIVITIS SICCA (HCC): Primary | ICD-10-CM

## 2019-07-23 DIAGNOSIS — M15.9 PRIMARY OSTEOARTHRITIS INVOLVING MULTIPLE JOINTS: ICD-10-CM

## 2019-07-23 PROBLEM — M15.0 PRIMARY OSTEOARTHRITIS INVOLVING MULTIPLE JOINTS: Status: ACTIVE | Noted: 2019-07-23

## 2019-07-23 LAB — POTASSIUM SERPL-SCNC: 5.7 MMOL/L (ref 3.5–5)

## 2019-07-23 PROCEDURE — 84132 ASSAY OF SERUM POTASSIUM: CPT

## 2019-07-23 PROCEDURE — 36415 COLL VENOUS BLD VENIPUNCTURE: CPT

## 2019-07-23 PROCEDURE — 99214 OFFICE O/P EST MOD 30 MIN: CPT | Performed by: INTERNAL MEDICINE

## 2019-07-23 RX ORDER — ZOLEDRONIC ACID 5 MG/100ML
INJECTION, SOLUTION INTRAVENOUS PRN
COMMUNITY
End: 2020-12-18

## 2019-07-23 SDOH — HEALTH STABILITY: MENTAL HEALTH: HOW OFTEN DO YOU HAVE A DRINK CONTAINING ALCOHOL?: MONTHLY OR LESS

## 2019-07-23 SDOH — HEALTH STABILITY: MENTAL HEALTH: HOW MANY STANDARD DRINKS CONTAINING ALCOHOL DO YOU HAVE ON A TYPICAL DAY?: 1 OR 2

## 2019-07-23 ASSESSMENT — ENCOUNTER SYMPTOMS
EYE PAIN: 0
COUGH: 0
CHEST TIGHTNESS: 0
PHOTOPHOBIA: 0
ABDOMINAL PAIN: 0
VOMITING: 0
EYE ITCHING: 0
BACK PAIN: 0
BLOOD IN STOOL: 0
EYE REDNESS: 0
SHORTNESS OF BREATH: 0
CONSTIPATION: 0
WHEEZING: 0
SORE THROAT: 0
DIARRHEA: 0
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
## 2019-08-05 ENCOUNTER — TELEPHONE (OUTPATIENT)
Dept: PRIMARY CARE CLINIC | Age: 74
End: 2019-08-05

## 2019-08-05 ENCOUNTER — TELEPHONE (OUTPATIENT)
Dept: RHEUMATOLOGY | Age: 74
End: 2019-08-05

## 2019-08-05 NOTE — TELEPHONE ENCOUNTER
----- Message from Jigar Gomez MD sent at 8/4/2019  1:08 PM EDT -----  Repeat potassium level is high . Recommend to follow up with PCP.

## 2019-08-23 RX ORDER — DICLOFENAC SODIUM 75 MG/1
75 TABLET, DELAYED RELEASE ORAL DAILY
Qty: 90 TABLET | Refills: 5 | Status: SHIPPED
Start: 2019-08-23 | End: 2020-09-01 | Stop reason: SDUPTHER

## 2019-08-30 RX ORDER — LEVOTHYROXINE SODIUM 0.12 MG/1
125 TABLET ORAL DAILY
Qty: 90 TABLET | Refills: 5 | Status: SHIPPED
Start: 2019-08-30 | End: 2020-07-06 | Stop reason: SDUPTHER

## 2019-10-10 RX ORDER — VENLAFAXINE HYDROCHLORIDE 37.5 MG/1
37.5 CAPSULE, EXTENDED RELEASE ORAL DAILY
Status: CANCELLED | OUTPATIENT
Start: 2019-10-10

## 2019-10-10 RX ORDER — VENLAFAXINE HYDROCHLORIDE 37.5 MG/1
37.5 CAPSULE, EXTENDED RELEASE ORAL DAILY
Qty: 90 CAPSULE | Refills: 5 | Status: SHIPPED
Start: 2019-10-10 | End: 2021-01-06 | Stop reason: SDUPTHER

## 2019-10-23 ENCOUNTER — TELEPHONE (OUTPATIENT)
Dept: ADMINISTRATIVE | Age: 74
End: 2019-10-23

## 2019-10-29 RX ORDER — METOPROLOL TARTRATE 50 MG/1
25 TABLET, FILM COATED ORAL 2 TIMES DAILY
Qty: 180 TABLET | Refills: 3 | Status: SHIPPED
Start: 2019-10-29 | End: 2021-01-26 | Stop reason: SDUPTHER

## 2019-11-25 ENCOUNTER — TELEPHONE (OUTPATIENT)
Dept: PRIMARY CARE CLINIC | Age: 74
End: 2019-11-25

## 2019-11-26 RX ORDER — PILOCARPINE HYDROCHLORIDE 7.5 MG/1
7.5 TABLET, FILM COATED ORAL 2 TIMES DAILY
Qty: 180 TABLET | Refills: 5 | Status: SHIPPED
Start: 2019-11-26 | End: 2020-12-18

## 2019-12-05 RX ORDER — GABAPENTIN 100 MG/1
200 CAPSULE ORAL 3 TIMES DAILY
Qty: 180 CAPSULE | Refills: 3 | Status: SHIPPED
Start: 2019-12-05 | End: 2020-04-22 | Stop reason: SDUPTHER

## 2019-12-13 ENCOUNTER — OFFICE VISIT (OUTPATIENT)
Dept: CARDIOLOGY CLINIC | Age: 74
End: 2019-12-13
Payer: MEDICARE

## 2019-12-13 VITALS
RESPIRATION RATE: 16 BRPM | SYSTOLIC BLOOD PRESSURE: 142 MMHG | BODY MASS INDEX: 27.14 KG/M2 | DIASTOLIC BLOOD PRESSURE: 86 MMHG | HEART RATE: 55 BPM | WEIGHT: 159 LBS | HEIGHT: 64 IN

## 2019-12-13 DIAGNOSIS — I47.1 AVNRT (AV NODAL RE-ENTRY TACHYCARDIA) (HCC): Primary | ICD-10-CM

## 2019-12-13 PROCEDURE — 93000 ELECTROCARDIOGRAM COMPLETE: CPT | Performed by: INTERNAL MEDICINE

## 2019-12-13 PROCEDURE — 99214 OFFICE O/P EST MOD 30 MIN: CPT | Performed by: INTERNAL MEDICINE

## 2019-12-17 ENCOUNTER — HOSPITAL ENCOUNTER (OUTPATIENT)
Age: 74
Discharge: HOME | End: 2019-12-19
Payer: MEDICARE

## 2019-12-17 ENCOUNTER — OFFICE VISIT (OUTPATIENT)
Dept: PRIMARY CARE CLINIC | Age: 74
End: 2019-12-17
Payer: MEDICARE

## 2019-12-17 VITALS
BODY MASS INDEX: 27.31 KG/M2 | DIASTOLIC BLOOD PRESSURE: 78 MMHG | TEMPERATURE: 97.9 F | WEIGHT: 160 LBS | SYSTOLIC BLOOD PRESSURE: 126 MMHG | HEIGHT: 64 IN

## 2019-12-17 DIAGNOSIS — M35.00 H/O SJOGREN'S DISEASE (HCC): ICD-10-CM

## 2019-12-17 DIAGNOSIS — I10 ESSENTIAL HYPERTENSION: ICD-10-CM

## 2019-12-17 DIAGNOSIS — E03.9 ACQUIRED HYPOTHYROIDISM: ICD-10-CM

## 2019-12-17 DIAGNOSIS — M15.9 PRIMARY OSTEOARTHRITIS INVOLVING MULTIPLE JOINTS: ICD-10-CM

## 2019-12-17 DIAGNOSIS — E78.2 MIXED HYPERLIPIDEMIA: ICD-10-CM

## 2019-12-17 DIAGNOSIS — E11.9 DIET-CONTROLLED DIABETES MELLITUS (HCC): ICD-10-CM

## 2019-12-17 DIAGNOSIS — E55.9 VITAMIN D DEFICIENCY: ICD-10-CM

## 2019-12-17 DIAGNOSIS — M35.01 SJOGREN'S SYNDROME WITH KERATOCONJUNCTIVITIS SICCA (HCC): Primary | ICD-10-CM

## 2019-12-17 DIAGNOSIS — E03.9 ACQUIRED HYPOTHYROIDISM: Chronic | ICD-10-CM

## 2019-12-17 PROBLEM — M15.0 PRIMARY OSTEOARTHRITIS INVOLVING MULTIPLE JOINTS: Chronic | Status: ACTIVE | Noted: 2019-07-23

## 2019-12-17 PROBLEM — M81.0 AGE-RELATED OSTEOPOROSIS WITHOUT CURRENT PATHOLOGICAL FRACTURE: Chronic | Status: ACTIVE | Noted: 2019-01-24

## 2019-12-17 PROBLEM — K21.9 GASTROESOPHAGEAL REFLUX DISEASE WITHOUT ESOPHAGITIS: Chronic | Status: ACTIVE | Noted: 2019-06-14

## 2019-12-17 PROBLEM — M17.12 PRIMARY OSTEOARTHRITIS OF LEFT KNEE: Chronic | Status: ACTIVE | Noted: 2017-11-17

## 2019-12-17 PROBLEM — Z96.652 STATUS POST LEFT KNEE REPLACEMENT: Chronic | Status: ACTIVE | Noted: 2017-11-17

## 2019-12-17 LAB
ALBUMIN SERPL-MCNC: 4.3 G/DL (ref 3.5–5.2)
ALP BLD-CCNC: 53 U/L (ref 35–104)
ALT SERPL-CCNC: 12 U/L (ref 0–32)
ANION GAP SERPL CALCULATED.3IONS-SCNC: 12 MMOL/L (ref 7–16)
AST SERPL-CCNC: 21 U/L (ref 0–31)
BASOPHILS ABSOLUTE: 0.07 E9/L (ref 0–0.2)
BASOPHILS RELATIVE PERCENT: 1.1 % (ref 0–2)
BILIRUB SERPL-MCNC: 0.5 MG/DL (ref 0–1.2)
BUN BLDV-MCNC: 18 MG/DL (ref 8–23)
CALCIUM SERPL-MCNC: 9.4 MG/DL (ref 8.6–10.2)
CHLORIDE BLD-SCNC: 105 MMOL/L (ref 98–107)
CHOLESTEROL, TOTAL: 170 MG/DL (ref 0–199)
CO2: 26 MMOL/L (ref 22–29)
CREAT SERPL-MCNC: 0.9 MG/DL (ref 0.5–1)
EOSINOPHILS ABSOLUTE: 0.47 E9/L (ref 0.05–0.5)
EOSINOPHILS RELATIVE PERCENT: 7.4 % (ref 0–6)
GFR AFRICAN AMERICAN: >60
GFR NON-AFRICAN AMERICAN: >60 ML/MIN/1.73
GLUCOSE BLD-MCNC: 94 MG/DL (ref 74–99)
HBA1C MFR BLD: 5.4 % (ref 4–5.6)
HCT VFR BLD CALC: 42.1 % (ref 34–48)
HDLC SERPL-MCNC: 50 MG/DL
HEMOGLOBIN: 12.9 G/DL (ref 11.5–15.5)
IMMATURE GRANULOCYTES #: 0.01 E9/L
IMMATURE GRANULOCYTES %: 0.2 % (ref 0–5)
LDL CHOLESTEROL CALCULATED: 99 MG/DL (ref 0–99)
LYMPHOCYTES ABSOLUTE: 1.63 E9/L (ref 1.5–4)
LYMPHOCYTES RELATIVE PERCENT: 25.5 % (ref 20–42)
MCH RBC QN AUTO: 29 PG (ref 26–35)
MCHC RBC AUTO-ENTMCNC: 30.6 % (ref 32–34.5)
MCV RBC AUTO: 94.6 FL (ref 80–99.9)
MONOCYTES ABSOLUTE: 0.9 E9/L (ref 0.1–0.95)
MONOCYTES RELATIVE PERCENT: 14.1 % (ref 2–12)
NEUTROPHILS ABSOLUTE: 3.3 E9/L (ref 1.8–7.3)
NEUTROPHILS RELATIVE PERCENT: 51.7 % (ref 43–80)
PDW BLD-RTO: 13.6 FL (ref 11.5–15)
PLATELET # BLD: 215 E9/L (ref 130–450)
PMV BLD AUTO: 10.4 FL (ref 7–12)
POTASSIUM SERPL-SCNC: 5.1 MMOL/L (ref 3.5–5)
RBC # BLD: 4.45 E12/L (ref 3.5–5.5)
SODIUM BLD-SCNC: 143 MMOL/L (ref 132–146)
T4 TOTAL: 10.4 MCG/DL (ref 4.5–11.7)
TOTAL PROTEIN: 6.8 G/DL (ref 6.4–8.3)
TRIGL SERPL-MCNC: 104 MG/DL (ref 0–149)
TSH SERPL DL<=0.05 MIU/L-ACNC: <0.01 UIU/ML (ref 0.27–4.2)
VITAMIN D 25-HYDROXY: 54 NG/ML (ref 30–100)
VLDLC SERPL CALC-MCNC: 21 MG/DL
WBC # BLD: 6.4 E9/L (ref 4.5–11.5)

## 2019-12-17 PROCEDURE — 36415 COLL VENOUS BLD VENIPUNCTURE: CPT

## 2019-12-17 PROCEDURE — 80061 LIPID PANEL: CPT

## 2019-12-17 PROCEDURE — 84443 ASSAY THYROID STIM HORMONE: CPT

## 2019-12-17 PROCEDURE — 99214 OFFICE O/P EST MOD 30 MIN: CPT | Performed by: FAMILY MEDICINE

## 2019-12-17 PROCEDURE — 83036 HEMOGLOBIN GLYCOSYLATED A1C: CPT

## 2019-12-17 PROCEDURE — 85025 COMPLETE CBC W/AUTO DIFF WBC: CPT

## 2019-12-17 PROCEDURE — 80053 COMPREHEN METABOLIC PANEL: CPT

## 2019-12-17 PROCEDURE — 84436 ASSAY OF TOTAL THYROXINE: CPT

## 2019-12-17 PROCEDURE — 82306 VITAMIN D 25 HYDROXY: CPT

## 2019-12-17 RX ORDER — CEVIMELINE HYDROCHLORIDE 30 MG/1
30 CAPSULE ORAL 3 TIMES DAILY
Qty: 270 CAPSULE | Refills: 12 | Status: SHIPPED
Start: 2019-12-17 | End: 2021-04-26 | Stop reason: SDUPTHER

## 2019-12-17 ASSESSMENT — PATIENT HEALTH QUESTIONNAIRE - PHQ9
1. LITTLE INTEREST OR PLEASURE IN DOING THINGS: 0
SUM OF ALL RESPONSES TO PHQ QUESTIONS 1-9: 0
2. FEELING DOWN, DEPRESSED OR HOPELESS: 0
SUM OF ALL RESPONSES TO PHQ QUESTIONS 1-9: 0
SUM OF ALL RESPONSES TO PHQ9 QUESTIONS 1 & 2: 0

## 2019-12-17 ASSESSMENT — ENCOUNTER SYMPTOMS
RESPIRATORY NEGATIVE: 1
EYES NEGATIVE: 1
BACK PAIN: 1
GASTROINTESTINAL NEGATIVE: 1
ALLERGIC/IMMUNOLOGIC NEGATIVE: 1

## 2019-12-19 ENCOUNTER — TELEPHONE (OUTPATIENT)
Dept: PRIMARY CARE CLINIC | Age: 74
End: 2019-12-19

## 2020-01-08 RX ORDER — FLUTICASONE PROPIONATE 50 MCG
2 SPRAY, SUSPENSION (ML) NASAL
Qty: 1 BOTTLE | Refills: 5 | Status: SHIPPED
Start: 2020-01-08 | End: 2020-07-22 | Stop reason: SDUPTHER

## 2020-03-25 PROBLEM — K21.9 GERD (GASTROESOPHAGEAL REFLUX DISEASE): Status: RESOLVED | Noted: 2020-03-25 | Resolved: 2020-03-24

## 2020-04-22 RX ORDER — GABAPENTIN 100 MG/1
200 CAPSULE ORAL 3 TIMES DAILY
Qty: 450 CAPSULE | Refills: 3 | Status: SHIPPED
Start: 2020-04-22 | End: 2021-02-23 | Stop reason: SDUPTHER

## 2020-05-05 ENCOUNTER — HOSPITAL ENCOUNTER (OUTPATIENT)
Age: 75
Discharge: HOME | End: 2020-05-07
Payer: MEDICARE

## 2020-05-05 LAB
ALBUMIN SERPL-MCNC: 4.3 G/DL (ref 3.5–5.2)
ALP BLD-CCNC: 54 U/L (ref 35–104)
ALT SERPL-CCNC: 16 U/L (ref 0–32)
ANION GAP SERPL CALCULATED.3IONS-SCNC: 13 MMOL/L (ref 7–16)
AST SERPL-CCNC: 26 U/L (ref 0–31)
BILIRUB SERPL-MCNC: 0.4 MG/DL (ref 0–1.2)
BUN BLDV-MCNC: 25 MG/DL (ref 8–23)
C-REACTIVE PROTEIN: 0.1 MG/DL (ref 0–0.4)
CALCIUM SERPL-MCNC: 9.3 MG/DL (ref 8.6–10.2)
CHLORIDE BLD-SCNC: 106 MMOL/L (ref 98–107)
CO2: 24 MMOL/L (ref 22–29)
CREAT SERPL-MCNC: 0.9 MG/DL (ref 0.5–1)
GFR AFRICAN AMERICAN: >60
GFR NON-AFRICAN AMERICAN: >60 ML/MIN/1.73
GLUCOSE BLD-MCNC: 104 MG/DL (ref 74–99)
POTASSIUM SERPL-SCNC: 5.3 MMOL/L (ref 3.5–5)
SODIUM BLD-SCNC: 143 MMOL/L (ref 132–146)
TOTAL PROTEIN: 6.6 G/DL (ref 6.4–8.3)

## 2020-05-05 PROCEDURE — 36415 COLL VENOUS BLD VENIPUNCTURE: CPT

## 2020-05-05 PROCEDURE — 80053 COMPREHEN METABOLIC PANEL: CPT

## 2020-05-05 PROCEDURE — 86235 NUCLEAR ANTIGEN ANTIBODY: CPT

## 2020-05-05 PROCEDURE — 86140 C-REACTIVE PROTEIN: CPT

## 2020-05-08 LAB
ENA TO SSA (RO) ANTIBODY: POSITIVE
ENA TO SSB (LA) ANTIBODY: POSITIVE

## 2020-06-15 ENCOUNTER — TELEPHONE (OUTPATIENT)
Dept: ADMINISTRATIVE | Age: 75
End: 2020-06-15

## 2020-07-01 ENCOUNTER — HOSPITAL ENCOUNTER (OUTPATIENT)
Age: 75
Discharge: HOME | End: 2020-07-03
Payer: MEDICARE

## 2020-07-01 LAB
ALBUMIN SERPL-MCNC: 4.3 G/DL (ref 3.5–5.2)
ALP BLD-CCNC: 54 U/L (ref 35–104)
ALT SERPL-CCNC: 15 U/L (ref 0–32)
ANION GAP SERPL CALCULATED.3IONS-SCNC: 12 MMOL/L (ref 7–16)
AST SERPL-CCNC: 24 U/L (ref 0–31)
BASOPHILS ABSOLUTE: 0.06 E9/L (ref 0–0.2)
BASOPHILS RELATIVE PERCENT: 1.1 % (ref 0–2)
BILIRUB SERPL-MCNC: 0.3 MG/DL (ref 0–1.2)
BILIRUBIN URINE: ABNORMAL
BLOOD, URINE: NEGATIVE
BUN BLDV-MCNC: 27 MG/DL (ref 8–23)
CALCIUM SERPL-MCNC: 9.4 MG/DL (ref 8.6–10.2)
CHLORIDE BLD-SCNC: 106 MMOL/L (ref 98–107)
CHOLESTEROL, TOTAL: 167 MG/DL (ref 0–199)
CLARITY: CLEAR
CO2: 23 MMOL/L (ref 22–29)
COLOR: YELLOW
CREAT SERPL-MCNC: 0.9 MG/DL (ref 0.5–1)
EOSINOPHILS ABSOLUTE: 0.38 E9/L (ref 0.05–0.5)
EOSINOPHILS RELATIVE PERCENT: 6.8 % (ref 0–6)
GFR AFRICAN AMERICAN: >60
GFR NON-AFRICAN AMERICAN: >60 ML/MIN/1.73
GLUCOSE BLD-MCNC: 101 MG/DL (ref 74–99)
GLUCOSE URINE: NEGATIVE MG/DL
HCT VFR BLD CALC: 41 % (ref 34–48)
HDLC SERPL-MCNC: 49 MG/DL
HEMOGLOBIN: 13.1 G/DL (ref 11.5–15.5)
IMMATURE GRANULOCYTES #: 0.01 E9/L
IMMATURE GRANULOCYTES %: 0.2 % (ref 0–5)
KETONES, URINE: ABNORMAL MG/DL
LDL CHOLESTEROL CALCULATED: 102 MG/DL (ref 0–99)
LEUKOCYTE ESTERASE, URINE: NEGATIVE
LYMPHOCYTES ABSOLUTE: 1.55 E9/L (ref 1.5–4)
LYMPHOCYTES RELATIVE PERCENT: 27.7 % (ref 20–42)
MCH RBC QN AUTO: 29.9 PG (ref 26–35)
MCHC RBC AUTO-ENTMCNC: 32 % (ref 32–34.5)
MCV RBC AUTO: 93.6 FL (ref 80–99.9)
MONOCYTES ABSOLUTE: 0.67 E9/L (ref 0.1–0.95)
MONOCYTES RELATIVE PERCENT: 12 % (ref 2–12)
NEUTROPHILS ABSOLUTE: 2.92 E9/L (ref 1.8–7.3)
NEUTROPHILS RELATIVE PERCENT: 52.2 % (ref 43–80)
NITRITE, URINE: NEGATIVE
PDW BLD-RTO: 13.6 FL (ref 11.5–15)
PH UA: 5.5 (ref 5–9)
PLATELET # BLD: 207 E9/L (ref 130–450)
PMV BLD AUTO: 10.5 FL (ref 7–12)
POTASSIUM SERPL-SCNC: 5.1 MMOL/L (ref 3.5–5)
PROTEIN UA: NEGATIVE MG/DL
RBC # BLD: 4.38 E12/L (ref 3.5–5.5)
SODIUM BLD-SCNC: 141 MMOL/L (ref 132–146)
SPECIFIC GRAVITY UA: 1.02 (ref 1–1.03)
T4 TOTAL: 9.1 MCG/DL (ref 4.5–11.7)
TOTAL PROTEIN: 6.7 G/DL (ref 6.4–8.3)
TRIGL SERPL-MCNC: 80 MG/DL (ref 0–149)
TSH SERPL DL<=0.05 MIU/L-ACNC: 0.01 UIU/ML (ref 0.27–4.2)
UROBILINOGEN, URINE: 0.2 E.U./DL
VLDLC SERPL CALC-MCNC: 16 MG/DL
WBC # BLD: 5.6 E9/L (ref 4.5–11.5)

## 2020-07-01 PROCEDURE — 85025 COMPLETE CBC W/AUTO DIFF WBC: CPT

## 2020-07-01 PROCEDURE — 84443 ASSAY THYROID STIM HORMONE: CPT

## 2020-07-01 PROCEDURE — 80053 COMPREHEN METABOLIC PANEL: CPT

## 2020-07-01 PROCEDURE — 36415 COLL VENOUS BLD VENIPUNCTURE: CPT

## 2020-07-01 PROCEDURE — 84436 ASSAY OF TOTAL THYROXINE: CPT

## 2020-07-01 PROCEDURE — 80061 LIPID PANEL: CPT

## 2020-07-01 PROCEDURE — 81003 URINALYSIS AUTO W/O SCOPE: CPT

## 2020-07-06 ENCOUNTER — OFFICE VISIT (OUTPATIENT)
Dept: PRIMARY CARE CLINIC | Age: 75
End: 2020-07-06
Payer: MEDICARE

## 2020-07-06 VITALS
SYSTOLIC BLOOD PRESSURE: 134 MMHG | WEIGHT: 160 LBS | TEMPERATURE: 97.9 F | DIASTOLIC BLOOD PRESSURE: 82 MMHG | BODY MASS INDEX: 27.9 KG/M2

## 2020-07-06 PROBLEM — R73.03 PRE-DIABETES: Status: ACTIVE | Noted: 2020-07-06

## 2020-07-06 PROCEDURE — 99214 OFFICE O/P EST MOD 30 MIN: CPT | Performed by: FAMILY MEDICINE

## 2020-07-06 RX ORDER — LEVOTHYROXINE SODIUM 0.12 MG/1
125 TABLET ORAL DAILY
Qty: 90 TABLET | Refills: 5 | Status: ON HOLD
Start: 2020-07-06 | End: 2021-06-16 | Stop reason: HOSPADM

## 2020-07-06 ASSESSMENT — ENCOUNTER SYMPTOMS
RESPIRATORY NEGATIVE: 1
ALLERGIC/IMMUNOLOGIC NEGATIVE: 1
EYES NEGATIVE: 1
GASTROINTESTINAL NEGATIVE: 1
BACK PAIN: 1

## 2020-07-06 ASSESSMENT — PATIENT HEALTH QUESTIONNAIRE - PHQ9
SUM OF ALL RESPONSES TO PHQ QUESTIONS 1-9: 0
SUM OF ALL RESPONSES TO PHQ9 QUESTIONS 1 & 2: 0
2. FEELING DOWN, DEPRESSED OR HOPELESS: 0
SUM OF ALL RESPONSES TO PHQ QUESTIONS 1-9: 0
1. LITTLE INTEREST OR PLEASURE IN DOING THINGS: 0
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
## 2020-07-22 RX ORDER — FLUTICASONE PROPIONATE 50 MCG
2 SPRAY, SUSPENSION (ML) NASAL
Qty: 1 BOTTLE | Refills: 5 | Status: SHIPPED
Start: 2020-07-22 | End: 2020-07-22 | Stop reason: SDUPTHER

## 2020-07-22 RX ORDER — FLUTICASONE PROPIONATE 50 MCG
2 SPRAY, SUSPENSION (ML) NASAL
Qty: 1 BOTTLE | Refills: 5 | Status: SHIPPED
Start: 2020-07-22 | End: 2021-03-13 | Stop reason: SDUPTHER

## 2020-09-01 RX ORDER — DICLOFENAC SODIUM 75 MG/1
75 TABLET, DELAYED RELEASE ORAL DAILY
Qty: 90 TABLET | Refills: 3 | Status: ON HOLD
Start: 2020-09-01 | End: 2021-06-16 | Stop reason: HOSPADM

## 2020-12-18 ENCOUNTER — OFFICE VISIT (OUTPATIENT)
Dept: CARDIOLOGY CLINIC | Age: 75
End: 2020-12-18
Payer: MEDICARE

## 2020-12-18 VITALS
WEIGHT: 163.3 LBS | BODY MASS INDEX: 27.88 KG/M2 | HEIGHT: 64 IN | SYSTOLIC BLOOD PRESSURE: 114 MMHG | DIASTOLIC BLOOD PRESSURE: 64 MMHG | HEART RATE: 67 BPM | RESPIRATION RATE: 16 BRPM

## 2020-12-18 PROCEDURE — 93000 ELECTROCARDIOGRAM COMPLETE: CPT | Performed by: INTERNAL MEDICINE

## 2020-12-18 PROCEDURE — 99214 OFFICE O/P EST MOD 30 MIN: CPT | Performed by: INTERNAL MEDICINE

## 2020-12-18 RX ORDER — ZINC GLUCONATE 50 MG
50 TABLET ORAL DAILY
COMMUNITY
End: 2021-07-26
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
## 2021-01-06 RX ORDER — VENLAFAXINE HYDROCHLORIDE 37.5 MG/1
37.5 CAPSULE, EXTENDED RELEASE ORAL DAILY
Qty: 90 CAPSULE | Refills: 3 | Status: SHIPPED
Start: 2021-01-06 | End: 2022-01-10 | Stop reason: SDUPTHER

## 2021-01-19 ENCOUNTER — HOSPITAL ENCOUNTER (EMERGENCY)
Age: 76
Discharge: HOME | End: 2021-01-20
Payer: MEDICARE

## 2021-01-19 ENCOUNTER — APPOINTMENT (OUTPATIENT)
Dept: GENERAL RADIOLOGY | Age: 76
End: 2021-01-19
Payer: MEDICARE

## 2021-01-19 VITALS
HEIGHT: 64 IN | BODY MASS INDEX: 28.17 KG/M2 | DIASTOLIC BLOOD PRESSURE: 86 MMHG | SYSTOLIC BLOOD PRESSURE: 141 MMHG | WEIGHT: 165 LBS | TEMPERATURE: 97.1 F | RESPIRATION RATE: 18 BRPM | HEART RATE: 77 BPM | OXYGEN SATURATION: 97 %

## 2021-01-19 DIAGNOSIS — R53.83 FATIGUE, UNSPECIFIED TYPE: Primary | ICD-10-CM

## 2021-01-19 LAB
BASOPHILS ABSOLUTE: 0.08 E9/L (ref 0–0.2)
BASOPHILS RELATIVE PERCENT: 1 % (ref 0–2)
BILIRUBIN URINE: NEGATIVE
BLOOD, URINE: NEGATIVE
CLARITY: CLEAR
COLOR: YELLOW
EOSINOPHILS ABSOLUTE: 0.59 E9/L (ref 0.05–0.5)
EOSINOPHILS RELATIVE PERCENT: 7.3 % (ref 0–6)
GLUCOSE URINE: NEGATIVE MG/DL
HCT VFR BLD CALC: 36.3 % (ref 34–48)
HEMOGLOBIN: 10.9 G/DL (ref 11.5–15.5)
IMMATURE GRANULOCYTES #: 0.02 E9/L
IMMATURE GRANULOCYTES %: 0.2 % (ref 0–5)
KETONES, URINE: NEGATIVE MG/DL
LEUKOCYTE ESTERASE, URINE: NEGATIVE
LYMPHOCYTES ABSOLUTE: 2.13 E9/L (ref 1.5–4)
LYMPHOCYTES RELATIVE PERCENT: 26.4 % (ref 20–42)
MCH RBC QN AUTO: 26.3 PG (ref 26–35)
MCHC RBC AUTO-ENTMCNC: 30 % (ref 32–34.5)
MCV RBC AUTO: 87.5 FL (ref 80–99.9)
MONOCYTES ABSOLUTE: 0.8 E9/L (ref 0.1–0.95)
MONOCYTES RELATIVE PERCENT: 9.9 % (ref 2–12)
NEUTROPHILS ABSOLUTE: 4.45 E9/L (ref 1.8–7.3)
NEUTROPHILS RELATIVE PERCENT: 55.2 % (ref 43–80)
NITRITE, URINE: NEGATIVE
PDW BLD-RTO: 13.9 FL (ref 11.5–15)
PH UA: 5.5 (ref 5–9)
PLATELET # BLD: 263 E9/L (ref 130–450)
PMV BLD AUTO: 10 FL (ref 7–12)
PROTEIN UA: NEGATIVE MG/DL
RBC # BLD: 4.15 E12/L (ref 3.5–5.5)
SPECIFIC GRAVITY UA: 1.02 (ref 1–1.03)
UROBILINOGEN, URINE: 0.2 E.U./DL
WBC # BLD: 8.1 E9/L (ref 4.5–11.5)

## 2021-01-19 PROCEDURE — 81003 URINALYSIS AUTO W/O SCOPE: CPT

## 2021-01-19 PROCEDURE — 85025 COMPLETE CBC W/AUTO DIFF WBC: CPT

## 2021-01-19 PROCEDURE — 80053 COMPREHEN METABOLIC PANEL: CPT

## 2021-01-19 PROCEDURE — 71046 X-RAY EXAM CHEST 2 VIEWS: CPT

## 2021-01-19 PROCEDURE — 99285 EMERGENCY DEPT VISIT HI MDM: CPT

## 2021-01-19 PROCEDURE — 84443 ASSAY THYROID STIM HORMONE: CPT

## 2021-01-19 PROCEDURE — 93005 ELECTROCARDIOGRAM TRACING: CPT | Performed by: PHYSICIAN ASSISTANT

## 2021-01-19 PROCEDURE — 36415 COLL VENOUS BLD VENIPUNCTURE: CPT

## 2021-01-19 RX ORDER — ERYTHROMYCIN 5 MG/G
OINTMENT OPHTHALMIC
COMMUNITY
Start: 2021-01-15 | End: 2021-07-26

## 2021-01-20 LAB
ALBUMIN SERPL-MCNC: 4.3 G/DL (ref 3.5–5.2)
ALP BLD-CCNC: 71 U/L (ref 35–104)
ALT SERPL-CCNC: 19 U/L (ref 0–32)
ANION GAP SERPL CALCULATED.3IONS-SCNC: 9 MMOL/L (ref 7–16)
AST SERPL-CCNC: 25 U/L (ref 0–31)
BILIRUB SERPL-MCNC: 0.2 MG/DL (ref 0–1.2)
BUN BLDV-MCNC: 24 MG/DL (ref 8–23)
CALCIUM SERPL-MCNC: 9.2 MG/DL (ref 8.6–10.2)
CHLORIDE BLD-SCNC: 103 MMOL/L (ref 98–107)
CO2: 25 MMOL/L (ref 22–29)
CREAT SERPL-MCNC: 1 MG/DL (ref 0.5–1)
EKG ATRIAL RATE: 63 BPM
EKG P AXIS: 55 DEGREES
EKG P-R INTERVAL: 140 MS
EKG Q-T INTERVAL: 402 MS
EKG QRS DURATION: 80 MS
EKG QTC CALCULATION (BAZETT): 411 MS
EKG R AXIS: -38 DEGREES
EKG T AXIS: 4 DEGREES
EKG VENTRICULAR RATE: 63 BPM
GFR AFRICAN AMERICAN: >60
GFR NON-AFRICAN AMERICAN: 54 ML/MIN/1.73
GLUCOSE BLD-MCNC: 93 MG/DL (ref 74–99)
POTASSIUM SERPL-SCNC: 4.8 MMOL/L (ref 3.5–5)
SODIUM BLD-SCNC: 137 MMOL/L (ref 132–146)
TOTAL PROTEIN: 7 G/DL (ref 6.4–8.3)
TSH SERPL DL<=0.05 MIU/L-ACNC: 0.02 UIU/ML (ref 0.27–4.2)
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
## 2021-01-26 RX ORDER — METOPROLOL TARTRATE 50 MG/1
25 TABLET, FILM COATED ORAL 2 TIMES DAILY
Qty: 90 TABLET | Refills: 3 | Status: SHIPPED
Start: 2021-01-26 | End: 2022-01-25 | Stop reason: SDUPTHER

## 2021-02-23 RX ORDER — GABAPENTIN 100 MG/1
200 CAPSULE ORAL 3 TIMES DAILY
Qty: 540 CAPSULE | Refills: 3 | Status: SHIPPED
Start: 2021-02-23 | End: 2022-05-04 | Stop reason: SDUPTHER

## 2021-02-23 RX ORDER — GABAPENTIN 100 MG/1
200 CAPSULE ORAL 3 TIMES DAILY
Qty: 540 CAPSULE | Refills: 3 | Status: CANCELLED | OUTPATIENT
Start: 2021-02-23 | End: 2022-02-18

## 2021-03-03 ENCOUNTER — IMMUNIZATION (OUTPATIENT)
Dept: PRIMARY CARE CLINIC | Age: 76
End: 2021-03-03
Payer: MEDICARE

## 2021-03-03 PROCEDURE — 0001A COVID-19, PFIZER VACCINE 30MCG/0.3ML DOSE: CPT | Performed by: PHYSICIAN ASSISTANT

## 2021-03-03 PROCEDURE — 91300 COVID-19, PFIZER VACCINE 30MCG/0.3ML DOSE: CPT | Performed by: PHYSICIAN ASSISTANT

## 2021-03-05 ENCOUNTER — TELEPHONE (OUTPATIENT)
Dept: PRIMARY CARE CLINIC | Age: 76
End: 2021-03-05

## 2021-03-08 DIAGNOSIS — M81.0 AGE-RELATED OSTEOPOROSIS WITHOUT CURRENT PATHOLOGICAL FRACTURE: ICD-10-CM

## 2021-03-08 DIAGNOSIS — E78.2 MIXED HYPERLIPIDEMIA: Chronic | ICD-10-CM

## 2021-03-08 DIAGNOSIS — R73.03 PRE-DIABETES: ICD-10-CM

## 2021-03-08 DIAGNOSIS — I10 ESSENTIAL HYPERTENSION: Chronic | ICD-10-CM

## 2021-03-08 DIAGNOSIS — E03.9 ACQUIRED HYPOTHYROIDISM: Chronic | ICD-10-CM

## 2021-03-08 LAB
ALBUMIN SERPL-MCNC: 4.3 G/DL (ref 3.5–5.2)
ALP BLD-CCNC: 63 U/L (ref 35–104)
ALT SERPL-CCNC: 16 U/L (ref 0–32)
ANION GAP SERPL CALCULATED.3IONS-SCNC: 11 MMOL/L (ref 7–16)
AST SERPL-CCNC: 25 U/L (ref 0–31)
BACTERIA: ABNORMAL /HPF
BASOPHILS ABSOLUTE: 0.06 E9/L (ref 0–0.2)
BASOPHILS RELATIVE PERCENT: 0.7 % (ref 0–2)
BILIRUB SERPL-MCNC: 0.3 MG/DL (ref 0–1.2)
BILIRUBIN URINE: NEGATIVE
BLOOD, URINE: NEGATIVE
BUN BLDV-MCNC: 27 MG/DL (ref 8–23)
CALCIUM SERPL-MCNC: 9.2 MG/DL (ref 8.6–10.2)
CHLORIDE BLD-SCNC: 107 MMOL/L (ref 98–107)
CHOLESTEROL, TOTAL: 167 MG/DL (ref 0–199)
CLARITY: CLEAR
CO2: 24 MMOL/L (ref 22–29)
COLOR: YELLOW
CREAT SERPL-MCNC: 0.9 MG/DL (ref 0.5–1)
EOSINOPHILS ABSOLUTE: 0.56 E9/L (ref 0.05–0.5)
EOSINOPHILS RELATIVE PERCENT: 6.7 % (ref 0–6)
EPITHELIAL CELLS, UA: ABNORMAL /HPF
GFR AFRICAN AMERICAN: >60
GFR NON-AFRICAN AMERICAN: >60 ML/MIN/1.73
GLUCOSE BLD-MCNC: 94 MG/DL (ref 74–99)
GLUCOSE URINE: NEGATIVE MG/DL
HBA1C MFR BLD: 5.5 % (ref 4–5.6)
HCT VFR BLD CALC: 37.3 % (ref 34–48)
HDLC SERPL-MCNC: 46 MG/DL
HEMOGLOBIN: 11 G/DL (ref 11.5–15.5)
IMMATURE GRANULOCYTES #: 0.03 E9/L
IMMATURE GRANULOCYTES %: 0.4 % (ref 0–5)
KETONES, URINE: NEGATIVE MG/DL
LDL CHOLESTEROL CALCULATED: 98 MG/DL (ref 0–99)
LEUKOCYTE ESTERASE, URINE: ABNORMAL
LYMPHOCYTES ABSOLUTE: 1.61 E9/L (ref 1.5–4)
LYMPHOCYTES RELATIVE PERCENT: 19.3 % (ref 20–42)
MCH RBC QN AUTO: 25.8 PG (ref 26–35)
MCHC RBC AUTO-ENTMCNC: 29.5 % (ref 32–34.5)
MCV RBC AUTO: 87.4 FL (ref 80–99.9)
MONOCYTES ABSOLUTE: 0.94 E9/L (ref 0.1–0.95)
MONOCYTES RELATIVE PERCENT: 11.3 % (ref 2–12)
NEUTROPHILS ABSOLUTE: 5.15 E9/L (ref 1.8–7.3)
NEUTROPHILS RELATIVE PERCENT: 61.6 % (ref 43–80)
NITRITE, URINE: POSITIVE
PDW BLD-RTO: 14.6 FL (ref 11.5–15)
PH UA: 5.5 (ref 5–9)
PLATELET # BLD: 248 E9/L (ref 130–450)
PMV BLD AUTO: 10.6 FL (ref 7–12)
POTASSIUM SERPL-SCNC: 5.6 MMOL/L (ref 3.5–5)
PROTEIN UA: NEGATIVE MG/DL
RBC # BLD: 4.27 E12/L (ref 3.5–5.5)
RBC UA: ABNORMAL /HPF (ref 0–2)
SODIUM BLD-SCNC: 142 MMOL/L (ref 132–146)
SPECIFIC GRAVITY UA: 1.02 (ref 1–1.03)
T4 TOTAL: 8.6 MCG/DL (ref 4.5–11.7)
TOTAL PROTEIN: 6.8 G/DL (ref 6.4–8.3)
TRIGL SERPL-MCNC: 114 MG/DL (ref 0–149)
TSH SERPL DL<=0.05 MIU/L-ACNC: 0.02 UIU/ML (ref 0.27–4.2)
UROBILINOGEN, URINE: 0.2 E.U./DL
VITAMIN D 25-HYDROXY: 56 NG/ML (ref 30–100)
VLDLC SERPL CALC-MCNC: 23 MG/DL
WBC # BLD: 8.4 E9/L (ref 4.5–11.5)
WBC UA: ABNORMAL /HPF (ref 0–5)

## 2021-03-10 ENCOUNTER — OFFICE VISIT (OUTPATIENT)
Dept: PRIMARY CARE CLINIC | Age: 76
End: 2021-03-10
Payer: MEDICARE

## 2021-03-10 VITALS — HEIGHT: 64 IN | WEIGHT: 164 LBS | TEMPERATURE: 97.9 F | BODY MASS INDEX: 28 KG/M2

## 2021-03-10 DIAGNOSIS — M35.01 SJOGREN'S SYNDROME WITH KERATOCONJUNCTIVITIS SICCA (HCC): Chronic | ICD-10-CM

## 2021-03-10 DIAGNOSIS — D50.8 OTHER IRON DEFICIENCY ANEMIA: ICD-10-CM

## 2021-03-10 DIAGNOSIS — I10 ESSENTIAL HYPERTENSION: Chronic | ICD-10-CM

## 2021-03-10 DIAGNOSIS — E03.9 ACQUIRED HYPOTHYROIDISM: Chronic | ICD-10-CM

## 2021-03-10 DIAGNOSIS — N30.00 ACUTE CYSTITIS WITHOUT HEMATURIA: ICD-10-CM

## 2021-03-10 DIAGNOSIS — D51.8 VITAMIN B12 DEFICIENCY (DIETARY) ANEMIA: ICD-10-CM

## 2021-03-10 DIAGNOSIS — Z12.31 VISIT FOR SCREENING MAMMOGRAM: ICD-10-CM

## 2021-03-10 DIAGNOSIS — Z00.00 ROUTINE GENERAL MEDICAL EXAMINATION AT A HEALTH CARE FACILITY: Primary | ICD-10-CM

## 2021-03-10 LAB
ANION GAP SERPL CALCULATED.3IONS-SCNC: 12 MMOL/L (ref 7–16)
BACTERIA: ABNORMAL /HPF
BASOPHILS ABSOLUTE: 0.09 E9/L (ref 0–0.2)
BASOPHILS RELATIVE PERCENT: 1.2 % (ref 0–2)
BILIRUBIN URINE: ABNORMAL
BLOOD, URINE: NEGATIVE
BUN BLDV-MCNC: 29 MG/DL (ref 8–23)
CALCIUM SERPL-MCNC: 9.3 MG/DL (ref 8.6–10.2)
CHLORIDE BLD-SCNC: 107 MMOL/L (ref 98–107)
CLARITY: CLEAR
CO2: 24 MMOL/L (ref 22–29)
COLOR: YELLOW
CREAT SERPL-MCNC: 1.2 MG/DL (ref 0.5–1)
EOSINOPHILS ABSOLUTE: 0.62 E9/L (ref 0.05–0.5)
EOSINOPHILS RELATIVE PERCENT: 8.1 % (ref 0–6)
EPITHELIAL CELLS, UA: ABNORMAL /HPF
GFR AFRICAN AMERICAN: 53
GFR NON-AFRICAN AMERICAN: 44 ML/MIN/1.73
GLUCOSE BLD-MCNC: 92 MG/DL (ref 74–99)
GLUCOSE URINE: NEGATIVE MG/DL
HCT VFR BLD CALC: 38.7 % (ref 34–48)
HEMOGLOBIN: 11.6 G/DL (ref 11.5–15.5)
IMMATURE GRANULOCYTES #: 0.02 E9/L
IMMATURE GRANULOCYTES %: 0.3 % (ref 0–5)
IRON: 36 MCG/DL (ref 37–145)
KETONES, URINE: ABNORMAL MG/DL
LEUKOCYTE ESTERASE, URINE: ABNORMAL
LYMPHOCYTES ABSOLUTE: 1.66 E9/L (ref 1.5–4)
LYMPHOCYTES RELATIVE PERCENT: 21.6 % (ref 20–42)
MCH RBC QN AUTO: 26 PG (ref 26–35)
MCHC RBC AUTO-ENTMCNC: 30 % (ref 32–34.5)
MCV RBC AUTO: 86.8 FL (ref 80–99.9)
MONOCYTES ABSOLUTE: 0.88 E9/L (ref 0.1–0.95)
MONOCYTES RELATIVE PERCENT: 11.4 % (ref 2–12)
NEUTROPHILS ABSOLUTE: 4.42 E9/L (ref 1.8–7.3)
NEUTROPHILS RELATIVE PERCENT: 57.4 % (ref 43–80)
NITRITE, URINE: NEGATIVE
PDW BLD-RTO: 14.6 FL (ref 11.5–15)
PH UA: 5 (ref 5–9)
PLATELET # BLD: 277 E9/L (ref 130–450)
PMV BLD AUTO: 10.7 FL (ref 7–12)
POTASSIUM SERPL-SCNC: 5.6 MMOL/L (ref 3.5–5)
PROTEIN UA: NEGATIVE MG/DL
RBC # BLD: 4.46 E12/L (ref 3.5–5.5)
RBC UA: ABNORMAL /HPF (ref 0–2)
SODIUM BLD-SCNC: 143 MMOL/L (ref 132–146)
SPECIFIC GRAVITY UA: >=1.03 (ref 1–1.03)
UROBILINOGEN, URINE: 0.2 E.U./DL
VITAMIN B-12: 750 PG/ML (ref 211–946)
WBC # BLD: 7.7 E9/L (ref 4.5–11.5)
WBC UA: ABNORMAL /HPF (ref 0–5)

## 2021-03-10 PROCEDURE — G0439 PPPS, SUBSEQ VISIT: HCPCS | Performed by: FAMILY MEDICINE

## 2021-03-10 ASSESSMENT — PATIENT HEALTH QUESTIONNAIRE - PHQ9: SUM OF ALL RESPONSES TO PHQ9 QUESTIONS 1 & 2: 0
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
## 2021-03-10 NOTE — PATIENT INSTRUCTIONS
Personalized Preventive Plan for Overton Osler - 3/10/2021  Medicare offers a range of preventive health benefits. Some of the tests and screenings are paid in full while other may be subject to a deductible, co-insurance, and/or copay. Some of these benefits include a comprehensive review of your medical history including lifestyle, illnesses that may run in your family, and various assessments and screenings as appropriate. After reviewing your medical record and screening and assessments performed today your provider may have ordered immunizations, labs, imaging, and/or referrals for you. A list of these orders (if applicable) as well as your Preventive Care list are included within your After Visit Summary for your review. Other Preventive Recommendations:    · A preventive eye exam performed by an eye specialist is recommended every 1-2 years to screen for glaucoma; cataracts, macular degeneration, and other eye disorders. · A preventive dental visit is recommended every 6 months. · Try to get at least 150 minutes of exercise per week or 10,000 steps per day on a pedometer . · Order or download the FREE \"Exercise & Physical Activity: Your Everyday Guide\" from [employer]. Call 9-902.178.5176 or search [employer] online. · You need 6175-7860 mg of calcium and 0355-1635 IU of vitamin D per day. It is possible to meet your calcium requirement with diet alone, but a vitamin D supplement is usually necessary to meet this goal.  · When exposed to the sun, use a sunscreen that protects against both UVA and UVB radiation with an SPF of 30 or greater. Reapply every 2 to 3 hours or after sweating, drying off with a towel, or swimming. · Always wear a seat belt when traveling in a car. Always wear a helmet when riding a bicycle or motorcycle.

## 2021-03-12 DIAGNOSIS — J30.2 SEASONAL ALLERGIC RHINITIS, UNSPECIFIED TRIGGER: ICD-10-CM

## 2021-03-13 RX ORDER — FLUTICASONE PROPIONATE 50 MCG
2 SPRAY, SUSPENSION (ML) NASAL
Qty: 1 BOTTLE | Refills: 5 | Status: SHIPPED
Start: 2021-03-13 | End: 2021-10-01 | Stop reason: SDUPTHER

## 2021-03-16 ENCOUNTER — NURSE TRIAGE (OUTPATIENT)
Dept: OTHER | Facility: CLINIC | Age: 76
End: 2021-03-16

## 2021-03-16 NOTE — TELEPHONE ENCOUNTER
Answer Assessment - Initial Assessment Questions  1. REASON FOR CALL or QUESTION: \"What is your reason for calling today? \" or \"How can I best help you? \" or \"What question do you have that I can help answer? \"          Patient wanted to know if she could take the second round of the Covid vaccine if she is taking antibiotics (pre-meds for a dental procedure). Per the CDC - vaccines should not be withheld due to antibiotic regimens.     Protocols used: INFORMATION ONLY CALL - NO TRIAGE-ADULT-[place]

## 2021-03-17 ENCOUNTER — OFFICE VISIT (OUTPATIENT)
Dept: PRIMARY CARE CLINIC | Age: 76
End: 2021-03-17
Payer: MEDICARE

## 2021-03-17 VITALS
SYSTOLIC BLOOD PRESSURE: 134 MMHG | WEIGHT: 164 LBS | TEMPERATURE: 98.2 F | DIASTOLIC BLOOD PRESSURE: 82 MMHG | BODY MASS INDEX: 28 KG/M2 | HEIGHT: 64 IN

## 2021-03-17 DIAGNOSIS — M35.01 SJOGREN'S SYNDROME WITH KERATOCONJUNCTIVITIS SICCA (HCC): Primary | Chronic | ICD-10-CM

## 2021-03-17 DIAGNOSIS — N30.00 ACUTE CYSTITIS WITHOUT HEMATURIA: ICD-10-CM

## 2021-03-17 DIAGNOSIS — D50.8 OTHER IRON DEFICIENCY ANEMIA: ICD-10-CM

## 2021-03-17 PROCEDURE — 99213 OFFICE O/P EST LOW 20 MIN: CPT | Performed by: FAMILY MEDICINE

## 2021-03-17 ASSESSMENT — ENCOUNTER SYMPTOMS
GASTROINTESTINAL NEGATIVE: 1
ALLERGIC/IMMUNOLOGIC NEGATIVE: 1
RESPIRATORY NEGATIVE: 1
EYES NEGATIVE: 1
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
## 2021-03-19 ENCOUNTER — TELEPHONE (OUTPATIENT)
Dept: PRIMARY CARE CLINIC | Age: 76
End: 2021-03-19

## 2021-03-19 DIAGNOSIS — D50.8 OTHER IRON DEFICIENCY ANEMIA: ICD-10-CM

## 2021-03-19 LAB
CONTROL: NORMAL
HEMOCCULT STL QL: NORMAL

## 2021-03-19 PROCEDURE — 82274 ASSAY TEST FOR BLOOD FECAL: CPT | Performed by: FAMILY MEDICINE

## 2021-03-29 ENCOUNTER — IMMUNIZATION (OUTPATIENT)
Dept: PRIMARY CARE CLINIC | Age: 76
End: 2021-03-29
Payer: MEDICARE

## 2021-03-29 PROCEDURE — 0002A COVID-19, PFIZER VACCINE 30MCG/0.3ML DOSE: CPT | Performed by: PHYSICIAN ASSISTANT

## 2021-03-29 PROCEDURE — 91300 COVID-19, PFIZER VACCINE 30MCG/0.3ML DOSE: CPT | Performed by: PHYSICIAN ASSISTANT

## 2021-04-26 DIAGNOSIS — M35.01 SJOGREN'S SYNDROME WITH KERATOCONJUNCTIVITIS SICCA (HCC): ICD-10-CM

## 2021-04-26 RX ORDER — CEVIMELINE HYDROCHLORIDE 30 MG/1
30 CAPSULE ORAL 3 TIMES DAILY
Qty: 270 CAPSULE | Refills: 12 | Status: SHIPPED
Start: 2021-04-26 | End: 2022-04-20 | Stop reason: SDUPTHER

## 2021-05-03 ENCOUNTER — TELEPHONE (OUTPATIENT)
Dept: CARDIOLOGY CLINIC | Age: 76
End: 2021-05-03

## 2021-05-03 NOTE — TELEPHONE ENCOUNTER
Patient wanted to let you know that she did have chest pain once this week,she took an aspirin and felt better, also she states her legs feel heavy and weak when she moves, she did have knee surgery in the past, please advise

## 2021-05-03 NOTE — TELEPHONE ENCOUNTER
Patient is an acceptable risk to proceed with cataract surgery. Sahil Billings D.O.   Cardiologist  Cardiology, [address]

## 2021-05-03 NOTE — TELEPHONE ENCOUNTER
Patient needs cardiac clearance for cataract surgery, she was seen in office on 12/18 and states she feels good other than some fatigue after Molecular Imaging work, denies any CP,SOB, or palpitations, she states she is able to preform daily activities without any cardiac issues, please advise

## 2021-06-15 ENCOUNTER — APPOINTMENT (OUTPATIENT)
Dept: GENERAL RADIOLOGY | Age: 76
End: 2021-06-15
Payer: MEDICARE

## 2021-06-15 ENCOUNTER — HOSPITAL ENCOUNTER (OUTPATIENT)
Age: 76
Setting detail: OBSERVATION
Discharge: HOME | End: 2021-06-16
Attending: EMERGENCY MEDICINE | Admitting: INTERNAL MEDICINE
Payer: MEDICARE

## 2021-06-15 ENCOUNTER — APPOINTMENT (OUTPATIENT)
Dept: CT IMAGING | Age: 76
End: 2021-06-15
Payer: MEDICARE

## 2021-06-15 DIAGNOSIS — D64.9 ANEMIA, UNSPECIFIED TYPE: ICD-10-CM

## 2021-06-15 DIAGNOSIS — R55 SYNCOPE AND COLLAPSE: Primary | ICD-10-CM

## 2021-06-15 LAB
ALBUMIN SERPL-MCNC: 3.9 G/DL (ref 3.5–5.2)
ALP BLD-CCNC: 66 U/L (ref 35–104)
ALT SERPL-CCNC: 15 U/L (ref 0–32)
ANION GAP SERPL CALCULATED.3IONS-SCNC: 10 MMOL/L (ref 7–16)
AST SERPL-CCNC: 21 U/L (ref 0–31)
BACTERIA: ABNORMAL /HPF
BASOPHILS ABSOLUTE: 0.06 E9/L (ref 0–0.2)
BASOPHILS RELATIVE PERCENT: 0.9 % (ref 0–2)
BILIRUB SERPL-MCNC: 0.4 MG/DL (ref 0–1.2)
BILIRUBIN URINE: NEGATIVE
BLOOD, URINE: NEGATIVE
BUN BLDV-MCNC: 24 MG/DL (ref 6–23)
CALCIUM SERPL-MCNC: 8.8 MG/DL (ref 8.6–10.2)
CHLORIDE BLD-SCNC: 109 MMOL/L (ref 98–107)
CLARITY: CLEAR
CO2: 22 MMOL/L (ref 22–29)
COLOR: YELLOW
CREAT SERPL-MCNC: 0.9 MG/DL (ref 0.5–1)
EKG ATRIAL RATE: 77 BPM
EKG P AXIS: 48 DEGREES
EKG P-R INTERVAL: 140 MS
EKG Q-T INTERVAL: 378 MS
EKG QRS DURATION: 88 MS
EKG QTC CALCULATION (BAZETT): 427 MS
EKG R AXIS: -29 DEGREES
EKG T AXIS: 18 DEGREES
EKG VENTRICULAR RATE: 77 BPM
EOSINOPHILS ABSOLUTE: 0.4 E9/L (ref 0.05–0.5)
EOSINOPHILS RELATIVE PERCENT: 6 % (ref 0–6)
GFR AFRICAN AMERICAN: >60
GFR NON-AFRICAN AMERICAN: >60 ML/MIN/1.73
GLUCOSE BLD-MCNC: 94 MG/DL (ref 74–99)
GLUCOSE URINE: NEGATIVE MG/DL
HCT VFR BLD CALC: 33.1 % (ref 34–48)
HEMOGLOBIN: 9.6 G/DL (ref 11.5–15.5)
IMMATURE GRANULOCYTES #: 0.01 E9/L
IMMATURE GRANULOCYTES %: 0.2 % (ref 0–5)
KETONES, URINE: NEGATIVE MG/DL
LEUKOCYTE ESTERASE, URINE: ABNORMAL
LYMPHOCYTES ABSOLUTE: 1.34 E9/L (ref 1.5–4)
LYMPHOCYTES RELATIVE PERCENT: 20.2 % (ref 20–42)
MAGNESIUM: 1.8 MG/DL (ref 1.6–2.6)
MCH RBC QN AUTO: 23.5 PG (ref 26–35)
MCHC RBC AUTO-ENTMCNC: 29 % (ref 32–34.5)
MCV RBC AUTO: 81.1 FL (ref 80–99.9)
MONOCYTES ABSOLUTE: 0.72 E9/L (ref 0.1–0.95)
MONOCYTES RELATIVE PERCENT: 10.9 % (ref 2–12)
NEUTROPHILS ABSOLUTE: 4.09 E9/L (ref 1.8–7.3)
NEUTROPHILS RELATIVE PERCENT: 61.8 % (ref 43–80)
NITRITE, URINE: POSITIVE
PDW BLD-RTO: 15.1 FL (ref 11.5–15)
PH UA: 5.5 (ref 5–9)
PLATELET # BLD: 242 E9/L (ref 130–450)
PMV BLD AUTO: 9.9 FL (ref 7–12)
POTASSIUM REFLEX MAGNESIUM: 4.4 MMOL/L (ref 3.5–5)
PRO-BNP: 362 PG/ML (ref 0–450)
PROTEIN UA: NEGATIVE MG/DL
RBC # BLD: 4.08 E12/L (ref 3.5–5.5)
RBC UA: ABNORMAL /HPF (ref 0–2)
SODIUM BLD-SCNC: 141 MMOL/L (ref 132–146)
SPECIFIC GRAVITY UA: 1.02 (ref 1–1.03)
T4 FREE: 1.95 NG/DL (ref 0.93–1.7)
TOTAL PROTEIN: 6.3 G/DL (ref 6.4–8.3)
TROPONIN, HIGH SENSITIVITY: 16 NG/L (ref 0–9)
TROPONIN, HIGH SENSITIVITY: 16 NG/L (ref 0–9)
TSH SERPL DL<=0.05 MIU/L-ACNC: 0.02 UIU/ML (ref 0.27–4.2)
UROBILINOGEN, URINE: 0.2 E.U./DL
WBC # BLD: 6.6 E9/L (ref 4.5–11.5)
WBC UA: ABNORMAL /HPF (ref 0–5)

## 2021-06-15 PROCEDURE — 6360000002 HC RX W HCPCS: Performed by: STUDENT IN AN ORGANIZED HEALTH CARE EDUCATION/TRAINING PROGRAM

## 2021-06-15 PROCEDURE — 87088 URINE BACTERIA CULTURE: CPT

## 2021-06-15 PROCEDURE — G0378 HOSPITAL OBSERVATION PER HR: HCPCS

## 2021-06-15 PROCEDURE — 6360000004 HC RX CONTRAST MEDICATION: Performed by: RADIOLOGY

## 2021-06-15 PROCEDURE — 84484 ASSAY OF TROPONIN QUANT: CPT

## 2021-06-15 PROCEDURE — 99285 EMERGENCY DEPT VISIT HI MDM: CPT

## 2021-06-15 PROCEDURE — 83735 ASSAY OF MAGNESIUM: CPT

## 2021-06-15 PROCEDURE — 85025 COMPLETE CBC W/AUTO DIFF WBC: CPT

## 2021-06-15 PROCEDURE — 71275 CT ANGIOGRAPHY CHEST: CPT

## 2021-06-15 PROCEDURE — 6370000000 HC RX 637 (ALT 250 FOR IP): Performed by: INTERNAL MEDICINE

## 2021-06-15 PROCEDURE — 81001 URINALYSIS AUTO W/SCOPE: CPT

## 2021-06-15 PROCEDURE — 80053 COMPREHEN METABOLIC PANEL: CPT

## 2021-06-15 PROCEDURE — 84439 ASSAY OF FREE THYROXINE: CPT

## 2021-06-15 PROCEDURE — 83880 ASSAY OF NATRIURETIC PEPTIDE: CPT

## 2021-06-15 PROCEDURE — 93005 ELECTROCARDIOGRAM TRACING: CPT | Performed by: STUDENT IN AN ORGANIZED HEALTH CARE EDUCATION/TRAINING PROGRAM

## 2021-06-15 PROCEDURE — 2580000003 HC RX 258: Performed by: STUDENT IN AN ORGANIZED HEALTH CARE EDUCATION/TRAINING PROGRAM

## 2021-06-15 PROCEDURE — 70450 CT HEAD/BRAIN W/O DYE: CPT

## 2021-06-15 PROCEDURE — 74174 CTA ABD&PLVS W/CONTRAST: CPT

## 2021-06-15 PROCEDURE — 87186 SC STD MICRODIL/AGAR DIL: CPT

## 2021-06-15 PROCEDURE — 71045 X-RAY EXAM CHEST 1 VIEW: CPT

## 2021-06-15 PROCEDURE — 84443 ASSAY THYROID STIM HORMONE: CPT

## 2021-06-15 PROCEDURE — 96375 TX/PRO/DX INJ NEW DRUG ADDON: CPT

## 2021-06-15 RX ORDER — BRIMONIDINE TARTRATE 2 MG/ML
1 SOLUTION/ DROPS OPHTHALMIC EVERY 12 HOURS
Status: DISCONTINUED | OUTPATIENT
Start: 2021-06-15 | End: 2021-06-16 | Stop reason: HOSPADM

## 2021-06-15 RX ORDER — TIMOLOL MALEATE 5 MG/ML
1 SOLUTION/ DROPS OPHTHALMIC EVERY 12 HOURS
Status: DISCONTINUED | OUTPATIENT
Start: 2021-06-15 | End: 2021-06-16 | Stop reason: HOSPADM

## 2021-06-15 RX ORDER — GABAPENTIN 100 MG/1
200 CAPSULE ORAL 3 TIMES DAILY
Status: DISCONTINUED | OUTPATIENT
Start: 2021-06-15 | End: 2021-06-16 | Stop reason: HOSPADM

## 2021-06-15 RX ORDER — LATANOPROST 50 UG/ML
1 SOLUTION/ DROPS OPHTHALMIC NIGHTLY
Status: DISCONTINUED | OUTPATIENT
Start: 2021-06-15 | End: 2021-06-16 | Stop reason: HOSPADM

## 2021-06-15 RX ORDER — ONDANSETRON 2 MG/ML
4 INJECTION INTRAMUSCULAR; INTRAVENOUS EVERY 6 HOURS PRN
Status: DISCONTINUED | OUTPATIENT
Start: 2021-06-15 | End: 2021-06-16 | Stop reason: HOSPADM

## 2021-06-15 RX ORDER — ACETAMINOPHEN 650 MG/1
650 SUPPOSITORY RECTAL EVERY 6 HOURS PRN
Status: DISCONTINUED | OUTPATIENT
Start: 2021-06-15 | End: 2021-06-16 | Stop reason: HOSPADM

## 2021-06-15 RX ORDER — HYDROXYCHLOROQUINE SULFATE 200 MG/1
200 TABLET, FILM COATED ORAL DAILY
Status: DISCONTINUED | OUTPATIENT
Start: 2021-06-16 | End: 2021-06-16 | Stop reason: HOSPADM

## 2021-06-15 RX ORDER — LEVOTHYROXINE SODIUM 0.12 MG/1
125 TABLET ORAL DAILY
Status: DISCONTINUED | OUTPATIENT
Start: 2021-06-16 | End: 2021-06-16 | Stop reason: HOSPADM

## 2021-06-15 RX ORDER — SODIUM CHLORIDE 0.9 % (FLUSH) 0.9 %
5-40 SYRINGE (ML) INJECTION EVERY 12 HOURS SCHEDULED
Status: DISCONTINUED | OUTPATIENT
Start: 2021-06-15 | End: 2021-06-16 | Stop reason: HOSPADM

## 2021-06-15 RX ORDER — LANSOPRAZOLE 15 MG/1
15 CAPSULE, DELAYED RELEASE ORAL DAILY
Status: DISCONTINUED | OUTPATIENT
Start: 2021-06-16 | End: 2021-06-15 | Stop reason: CLARIF

## 2021-06-15 RX ORDER — PANTOPRAZOLE SODIUM 40 MG/1
40 TABLET, DELAYED RELEASE ORAL
Status: DISCONTINUED | OUTPATIENT
Start: 2021-06-16 | End: 2021-06-16 | Stop reason: HOSPADM

## 2021-06-15 RX ORDER — SODIUM CHLORIDE 0.9 % (FLUSH) 0.9 %
10 SYRINGE (ML) INJECTION ONCE
Status: DISCONTINUED | OUTPATIENT
Start: 2021-06-15 | End: 2021-06-16 | Stop reason: HOSPADM

## 2021-06-15 RX ORDER — SODIUM CHLORIDE 0.9 % (FLUSH) 0.9 %
5-40 SYRINGE (ML) INJECTION PRN
Status: DISCONTINUED | OUTPATIENT
Start: 2021-06-15 | End: 2021-06-16 | Stop reason: HOSPADM

## 2021-06-15 RX ORDER — ACETAMINOPHEN 325 MG/1
650 TABLET ORAL EVERY 6 HOURS PRN
Status: DISCONTINUED | OUTPATIENT
Start: 2021-06-15 | End: 2021-06-16 | Stop reason: HOSPADM

## 2021-06-15 RX ORDER — BRIMONIDINE TARTRATE, TIMOLOL MALEATE 2; 5 MG/ML; MG/ML
1 SOLUTION/ DROPS OPHTHALMIC EVERY 12 HOURS
Status: DISCONTINUED | OUTPATIENT
Start: 2021-06-15 | End: 2021-06-15 | Stop reason: ALTCHOICE

## 2021-06-15 RX ORDER — FLUTICASONE PROPIONATE 50 MCG
2 SPRAY, SUSPENSION (ML) NASAL
Status: DISCONTINUED | OUTPATIENT
Start: 2021-06-16 | End: 2021-06-16 | Stop reason: HOSPADM

## 2021-06-15 RX ORDER — VENLAFAXINE HYDROCHLORIDE 37.5 MG/1
37.5 CAPSULE, EXTENDED RELEASE ORAL DAILY
Status: DISCONTINUED | OUTPATIENT
Start: 2021-06-16 | End: 2021-06-16 | Stop reason: HOSPADM

## 2021-06-15 RX ORDER — SODIUM CHLORIDE 9 MG/ML
25 INJECTION, SOLUTION INTRAVENOUS PRN
Status: DISCONTINUED | OUTPATIENT
Start: 2021-06-15 | End: 2021-06-16 | Stop reason: HOSPADM

## 2021-06-15 RX ORDER — ONDANSETRON 4 MG/1
4 TABLET, ORALLY DISINTEGRATING ORAL EVERY 8 HOURS PRN
Status: DISCONTINUED | OUTPATIENT
Start: 2021-06-15 | End: 2021-06-16 | Stop reason: HOSPADM

## 2021-06-15 RX ORDER — POLYETHYLENE GLYCOL 3350 17 G/17G
17 POWDER, FOR SOLUTION ORAL DAILY PRN
Status: DISCONTINUED | OUTPATIENT
Start: 2021-06-15 | End: 2021-06-16 | Stop reason: HOSPADM

## 2021-06-15 RX ADMIN — METOPROLOL TARTRATE 25 MG: 25 TABLET, FILM COATED ORAL at 23:16

## 2021-06-15 RX ADMIN — IOPAMIDOL 90 ML: 755 INJECTION, SOLUTION INTRAVENOUS at 18:23

## 2021-06-15 RX ADMIN — CEFTRIAXONE 1000 MG: 1 INJECTION, POWDER, FOR SOLUTION INTRAMUSCULAR; INTRAVENOUS at 21:54

## 2021-06-15 RX ADMIN — GABAPENTIN 200 MG: 100 CAPSULE ORAL at 23:16

## 2021-06-15 ASSESSMENT — PAIN DESCRIPTION - ONSET: ONSET: OTHER (COMMENT)

## 2021-06-15 ASSESSMENT — ENCOUNTER SYMPTOMS
SHORTNESS OF BREATH: 0
EYE PAIN: 0
RHINORRHEA: 0
NAUSEA: 0
VOMITING: 0
ABDOMINAL PAIN: 1
ABDOMINAL DISTENTION: 0
WHEEZING: 0
COUGH: 0
DIARRHEA: 0

## 2021-06-15 ASSESSMENT — PAIN - FUNCTIONAL ASSESSMENT: PAIN_FUNCTIONAL_ASSESSMENT: ACTIVITIES ARE NOT PREVENTED

## 2021-06-15 ASSESSMENT — PAIN DESCRIPTION - FREQUENCY: FREQUENCY: INTERMITTENT

## 2021-06-15 ASSESSMENT — PAIN DESCRIPTION - PROGRESSION: CLINICAL_PROGRESSION: NOT CHANGED

## 2021-06-15 ASSESSMENT — PAIN SCALES - GENERAL: PAINLEVEL_OUTOF10: 3

## 2021-06-15 ASSESSMENT — PAIN DESCRIPTION - LOCATION: LOCATION: ABDOMEN

## 2021-06-15 ASSESSMENT — PAIN DESCRIPTION - PAIN TYPE: TYPE: CHRONIC PAIN

## 2021-06-15 ASSESSMENT — PAIN DESCRIPTION - DESCRIPTORS: DESCRIPTORS: ACHING

## 2021-06-15 ASSESSMENT — PAIN DESCRIPTION - DIRECTION: RADIATING_TOWARDS: ABD

## 2021-06-15 ASSESSMENT — PAIN DESCRIPTION - ORIENTATION: ORIENTATION: ANTERIOR

## 2021-06-15 NOTE — ED NOTES
Bed: 08  Expected date:   Expected time:   Means of arrival:   Comments:  Meg Leigh RN  06/15/21 4335

## 2021-06-15 NOTE — ED NOTES
This RN called lab for add-on orders that are still not in process. They said they would run the Magnesium and T4 free as soon as they can.       Claude Hope RN  06/15/21 0018

## 2021-06-15 NOTE — ED PROVIDER NOTES
[facility] AT [facility] Emergency Room          Pt Name: Lencho Harris  MRN: [id number]  [place] 1945  Date of evaluation: 6/15/2021      CHIEF COMPLAINT  Chief Complaint   Patient presents with    Loss of Consciousness     multiple episodes over past couple weeks, was at eye surgery for cataracts and found to be in bigeminy.  Abdominal Pain        Lencho Harris is a 77 y.o. female presenting to the ED with chief complaint of loss of consciousness which occurred multiple times over the past few months. Patient states she was at her eye surgery about to have cataract surgery when she was told she was in [facility]. Patient states she has had multiple syncopal episodes when she is at rest and no symptoms before or after. She is significant of Sjogren's. She also has a generalized fatigue has been ongoing for months. She admits to abdominal pain present when she sits that is on the lower part of her abdomen which she describes as being bloated. She has no abdominal pain presently. Patient's complaints have been constant without alleviating or exacerbating factors and mild in severity.     HPI       Patient has a medical history as listed below:   Past Medical History:   Diagnosis Date    ANS (arteriolar nephrosclerosis)     Anxiety 2018    Arrhythmia 1995    Atrial Fibrillation 1995, Reentrant AV elfego tachycardia, radiofrequency ablation March [address]  / follow with Dr. Madison Juarez every year    Atrial fibrillation [facility])     AVNRT (AV elfego re-entry tachycardia) ([facility] 2008?    s/p ablation /resolved; noted 9/28/18- had recent fast heart rate & hypertensoion & seeing Dr. Lisa Veloz  9/28/18    Chronic kidney disease 05/2017    Depression     Dry eyes     Dry mouth     Fatigue     GERD (gastroesophageal reflux disease)     Glaucoma     left eyes    Glaucoma     HH (hiatus hernia)     Hyperlipidemia     Hypertension     Hypothyroidism     Menopause     Osteoarthritis     Rate and Rhythm: Normal rate. Rhythm irregular. Pulses: Normal pulses. Heart sounds: Normal heart sounds. Pulmonary:      Effort: Pulmonary effort is normal. No respiratory distress. Breath sounds: Normal breath sounds. No wheezing, rhonchi or rales. Abdominal:      General: Abdomen is flat. There is no distension. Palpations: Abdomen is soft. There is no mass or pulsatile mass. Tenderness: There is no abdominal tenderness. There is no guarding or rebound. Musculoskeletal:         General: No swelling or deformity. Cervical back: Normal range of motion. Right lower leg: No edema. Left lower leg: No edema. Skin:     General: Skin is warm and dry. Capillary Refill: Capillary refill takes less than 2 seconds. Neurological:      General: No focal deficit present. Mental Status: She is alert and oriented to person, place, and time. Cranial Nerves: No cranial nerve deficit. Motor: No weakness. Procedures     [facility]     ED Course as of Jun 16 1005   Tue Ronni 15, 2021   1641 EKG shows sinus rhythm with frequent PVCs. Bigeminy pattern present. No STEMI. Bigeminy is new when compared to prior EKG. [WL]      ED Course User Index  [WL] Annamary Angelucci, DO        Patient presents to the ED for evaluation. This patient was seen and evaluated with the attending. Work-up was performed with concerns for but not limited to ACS, arrhythmia, Pneumonia and Bowel obstruction  Lab work and imaging showed a low hemoglobin with elevated troponin and likely urinary tract infection causing patient's suprapubic pain. T4 was high and TSH was low on other blood work. Since patient syncopized plan be to admit to medicine place her on telemetry as she was found to have bigeminy on her EKG. .   Patient requires continued workup and management of their symptoms and will be admitted to the hospital for further evaluation and treatment. Platelets 879 341 - 705 E9/L    MPV 9.9 7.0 - 12.0 fL    Neutrophils % 61.8 43.0 - 80.0 %    Immature Granulocytes % 0.2 0.0 - 5.0 %    Lymphocytes % 20.2 20.0 - 42.0 %    Monocytes % 10.9 2.0 - 12.0 %    Eosinophils % 6.0 0.0 - 6.0 %    Basophils % 0.9 0.0 - 2.0 %    Neutrophils Absolute 4.09 1.80 - 7.30 E9/L    Immature Granulocytes # 0.01 E9/L    Lymphocytes Absolute 1.34 (L) 1.50 - 4.00 E9/L    Monocytes Absolute 0.72 0.10 - 0.95 E9/L    Eosinophils Absolute 0.40 0.05 - 0.50 E9/L    Basophils Absolute 0.06 0.00 - 0.20 E9/L   Troponin   Result Value Ref Range    Troponin, High Sensitivity 16 (H) 0 - 9 ng/L   Brain Natriuretic Peptide   Result Value Ref Range    Pro- 0 - 450 pg/mL   Comprehensive Metabolic Panel w/ Reflex to MG   Result Value Ref Range    Sodium 141 132 - 146 mmol/L    Potassium reflex Magnesium 4.4 3.5 - 5.0 mmol/L    Chloride 109 (H) 98 - 107 mmol/L    CO2 22 22 - 29 mmol/L    Anion Gap 10 7 - 16 mmol/L    Glucose 94 74 - 99 mg/dL    BUN 24 (H) 6 - 23 mg/dL    CREATININE 0.9 0.5 - 1.0 mg/dL    GFR Non-African American >60 >=60 mL/min/1.73    GFR African American >60     Calcium 8.8 8.6 - 10.2 mg/dL    Total Protein 6.3 (L) 6.4 - 8.3 g/dL    Albumin 3.9 3.5 - 5.2 g/dL    Total Bilirubin 0.4 0.0 - 1.2 mg/dL    Alkaline Phosphatase 66 35 - 104 U/L    ALT 15 0 - 32 U/L    AST 21 0 - 31 U/L   Urinalysis with Microscopic   Result Value Ref Range    Color, UA Yellow Straw/Yellow    Clarity, UA Clear Clear    Glucose, Ur Negative Negative mg/dL    Bilirubin Urine Negative Negative    Ketones, Urine Negative Negative mg/dL    Specific Gravity, UA 1.020 1.005 - 1.030    Blood, Urine Negative Negative    pH, UA 5.5 5.0 - 9.0    Protein, UA Negative Negative mg/dL    Urobilinogen, Urine 0.2 <2.0 E.U./dL    Nitrite, Urine POSITIVE (A) Negative    Leukocyte Esterase, Urine SMALL (A) Negative    WBC, UA 2-5 0 - 5 /HPF    RBC, UA NONE 0 - 2 /HPF    Bacteria, UA MANY (A) None Seen /HPF   TSH WITHOUT REFLEX   Result Value Ref Range    TSH 0.016 (L) 0.270 - 4.200 uIU/mL   Magnesium   Result Value Ref Range    Magnesium 1.8 1.6 - 2.6 mg/dL   T4, FREE   Result Value Ref Range    T4 Free 1.95 (H) 0.93 - 1.70 ng/dL   Troponin   Result Value Ref Range    Troponin, High Sensitivity 16 (H) 0 - 9 ng/L   EKG 12 Lead   Result Value Ref Range    Ventricular Rate 77 BPM    Atrial Rate 77 BPM    P-R Interval 140 ms    QRS Duration 88 ms    Q-T Interval 378 ms    QTc Calculation (Bazett) 427 ms    P Axis 48 degrees    R Axis -29 degrees    T Axis 18 degrees       RADIOLOGY:  CTA CHEST W CONTRAST   Final Result   No evidence of pulmonary embolism or acute pulmonary abnormality. Normal appearing thoracic aorta with no evidence for intramural hematoma,   aortic dissection or aneurysmal dilatation. Small hiatal hernia. CTA ABDOMEN PELVIS W CONTRAST   Final Result   Normal appearing abdominal aorta and iliac arteries no evidence for   intramural hematoma, aneurysmal dilatation or dissection. Hiatal hernia. CT HEAD WO CONTRAST   Final Result   No acute intracranial abnormality. XR CHEST PORTABLE   Final Result   No pneumonia or pleural effusion.                   ------------------------- NURSING NOTES AND VITALS REVIEWED ---------------------------  Date / Time Roomed:  6/15/2021  1:33 PM  ED Bed Assignment:  8503/8503-A    The nursing notes within the ED encounter and vital signs as below have been reviewed.      Patient Vitals for the past 24 hrs:   BP Temp Temp src Pulse Resp SpO2 Height Weight   06/16/21 0745 (!) 114/50 98.9 °F (37.2 °C) Oral 97 16 100 % -- --   06/16/21 0009 (!) 164/57 98.5 °F (36.9 °C) Axillary 84 18 98 % -- --   06/15/21 2230 (!) 150/60 96.8 °F (36 °C) Temporal 72 14 97 % -- --   06/15/21 2157 (!) 167/59 97.2 °F (36.2 °C) Temporal 79 16 97 % -- --   06/15/21 1922 (!) 148/64 97 °F (36.1 °C) -- 79 16 98 % -- --   06/15/21 1445 -- -- -- 79 18 98 % -- --   06/15/21 1430 136/60 -- -- 76 23 95 % -- --   06/15/21 1415 -- -- -- 91 21 98 % -- --   06/15/21 1407 (!) 164/70 -- -- 76 14 98 % -- --   06/15/21 1406 -- -- -- -- 14 97 % -- --   06/15/21 1400 (!) 153/63 -- -- 76 20 92 % -- --   06/15/21 1336 (!) 181/70 96.9 °F (36.1 °C) Temporal 57 16 97 % 5' 4\" (1.626 m) 165 lb (74.8 kg)           Counseling:  I have spoken with the patient/family members and discussed todays results, in addition to providing specific details for the plan of care and counseling regarding the diagnosis and prognosis. Their questions are answered at this time and they are agreeable with the plan of admission. This patient has remained hemodynamically stable during their ED course. Diagnosis:  1. Syncope and collapse    2. Anemia, unspecified type        Disposition:  Patient's disposition: Admit  Patient's condition is stable. NOTE:  This report was transcribed using voice recognition software. Efforts were made to ensure accuracy; however, inadvertent computerized transcription errors may be present.        Myriam Breen DO  Resident  06/16/21 0866

## 2021-06-16 VITALS
SYSTOLIC BLOOD PRESSURE: 118 MMHG | WEIGHT: 165 LBS | HEIGHT: 64 IN | TEMPERATURE: 97.4 F | OXYGEN SATURATION: 100 % | DIASTOLIC BLOOD PRESSURE: 66 MMHG | HEART RATE: 77 BPM | BODY MASS INDEX: 28.17 KG/M2 | RESPIRATION RATE: 16 BRPM

## 2021-06-16 PROCEDURE — APPSS45 APP SPLIT SHARED TIME 31-45 MINUTES: Performed by: NURSE PRACTITIONER

## 2021-06-16 PROCEDURE — 6370000000 HC RX 637 (ALT 250 FOR IP): Performed by: INTERNAL MEDICINE

## 2021-06-16 PROCEDURE — 96365 THER/PROPH/DIAG IV INF INIT: CPT

## 2021-06-16 PROCEDURE — 99215 OFFICE O/P EST HI 40 MIN: CPT | Performed by: INTERNAL MEDICINE

## 2021-06-16 PROCEDURE — 96376 TX/PRO/DX INJ SAME DRUG ADON: CPT

## 2021-06-16 PROCEDURE — 2580000003 HC RX 258: Performed by: INTERNAL MEDICINE

## 2021-06-16 PROCEDURE — 6360000002 HC RX W HCPCS: Performed by: INTERNAL MEDICINE

## 2021-06-16 PROCEDURE — G0378 HOSPITAL OBSERVATION PER HR: HCPCS

## 2021-06-16 PROCEDURE — 6360000002 HC RX W HCPCS: Performed by: NURSE PRACTITIONER

## 2021-06-16 RX ORDER — MAGNESIUM SULFATE 1 G/100ML
1000 INJECTION INTRAVENOUS ONCE
Status: COMPLETED | OUTPATIENT
Start: 2021-06-16 | End: 2021-06-16

## 2021-06-16 RX ORDER — LEVOTHYROXINE SODIUM 112 UG/1
112 TABLET ORAL DAILY
Qty: 30 TABLET | Refills: 0 | Status: SHIPPED | OUTPATIENT
Start: 2021-06-17 | End: 2021-07-15 | Stop reason: SDUPTHER

## 2021-06-16 RX ORDER — MAGNESIUM SULFATE HEPTAHYDRATE 500 MG/ML
1000 INJECTION, SOLUTION INTRAMUSCULAR; INTRAVENOUS ONCE
Status: DISCONTINUED | OUTPATIENT
Start: 2021-06-16 | End: 2021-06-16 | Stop reason: SDUPTHER

## 2021-06-16 RX ADMIN — TIMOLOL MALEATE 1 DROP: 5 SOLUTION OPHTHALMIC at 00:00

## 2021-06-16 RX ADMIN — BRIMONIDINE TARTRATE 1 DROP: 2 SOLUTION OPHTHALMIC at 11:35

## 2021-06-16 RX ADMIN — GABAPENTIN 200 MG: 100 CAPSULE ORAL at 09:11

## 2021-06-16 RX ADMIN — METOPROLOL TARTRATE 25 MG: 25 TABLET, FILM COATED ORAL at 09:11

## 2021-06-16 RX ADMIN — BRIMONIDINE TARTRATE 1 DROP: 2 SOLUTION OPHTHALMIC at 00:00

## 2021-06-16 RX ADMIN — SODIUM CHLORIDE, PRESERVATIVE FREE 10 ML: 5 INJECTION INTRAVENOUS at 00:07

## 2021-06-16 RX ADMIN — HYDROXYCHLOROQUINE SULFATE 200 MG: 200 TABLET ORAL at 09:11

## 2021-06-16 RX ADMIN — LEVOTHYROXINE SODIUM 125 MCG: 0.12 TABLET ORAL at 06:51

## 2021-06-16 RX ADMIN — SODIUM CHLORIDE, PRESERVATIVE FREE 10 ML: 5 INJECTION INTRAVENOUS at 09:10

## 2021-06-16 RX ADMIN — CEFTRIAXONE 1000 MG: 1 INJECTION, POWDER, FOR SOLUTION INTRAMUSCULAR; INTRAVENOUS at 09:11

## 2021-06-16 RX ADMIN — ACETAMINOPHEN 650 MG: 325 TABLET ORAL at 06:51

## 2021-06-16 RX ADMIN — ACETAMINOPHEN 650 MG: 325 TABLET ORAL at 00:08

## 2021-06-16 RX ADMIN — LATANOPROST 1 DROP: 50 SOLUTION OPHTHALMIC at 00:02

## 2021-06-16 RX ADMIN — MAGNESIUM SULFATE HEPTAHYDRATE 1000 MG: 1 INJECTION, SOLUTION INTRAVENOUS at 15:41

## 2021-06-16 RX ADMIN — VENLAFAXINE HYDROCHLORIDE 37.5 MG: 37.5 CAPSULE, EXTENDED RELEASE ORAL at 09:11

## 2021-06-16 RX ADMIN — GABAPENTIN 200 MG: 100 CAPSULE ORAL at 14:30

## 2021-06-16 RX ADMIN — PANTOPRAZOLE SODIUM 40 MG: 40 TABLET, DELAYED RELEASE ORAL at 06:51

## 2021-06-16 RX ADMIN — FLUTICASONE PROPIONATE 2 SPRAY: 50 SPRAY, METERED NASAL at 09:10

## 2021-06-16 RX ADMIN — ENOXAPARIN SODIUM 40 MG: 100 INJECTION SUBCUTANEOUS at 09:10

## 2021-06-16 RX ADMIN — TIMOLOL MALEATE 1 DROP: 5 SOLUTION OPHTHALMIC at 11:36

## 2021-06-16 ASSESSMENT — PAIN SCALES - GENERAL
PAINLEVEL_OUTOF10: 0
PAINLEVEL_OUTOF10: 5
PAINLEVEL_OUTOF10: 3

## 2021-06-16 ASSESSMENT — PAIN DESCRIPTION - DIRECTION: RADIATING_TOWARDS: BACK

## 2021-06-16 ASSESSMENT — PAIN DESCRIPTION - LOCATION: LOCATION: ABDOMEN

## 2021-06-16 ASSESSMENT — PAIN DESCRIPTION - ORIENTATION: ORIENTATION: LOWER

## 2021-06-16 ASSESSMENT — PAIN DESCRIPTION - ONSET: ONSET: GRADUAL

## 2021-06-16 ASSESSMENT — PAIN DESCRIPTION - DESCRIPTORS: DESCRIPTORS: CRAMPING

## 2021-06-16 ASSESSMENT — PAIN DESCRIPTION - FREQUENCY: FREQUENCY: INTERMITTENT

## 2021-06-16 NOTE — ED NOTES
spoke with Toni Cummings on [facility] to inform that SBAR was faxed.       Elisabeth Bellamy RN  06/15/21 0815

## 2021-06-16 NOTE — H&P
[address]                  [address] [facility]                              HISTORY AND PHYSICAL    PATIENT NAME: Oliver Ann                   :        1945  MED REC NO:   [id number]                            ROOM:       8503  ACCOUNT NO:   [de-identified]                           ADMIT DATE: 06/15/2021  PROVIDER:     José Oneal DO    CHIEF COMPLAINT:  Syncope. HISTORY OF PRESENT ILLNESS:  The patient is a 41-year-old   female who presented to the emergency room from the eye surgical center. She was scheduled for cataract surgery. She was noted to be in  bigeminy. She was sent to the emergency room. EKG in the emergency  room revealed sinus rhythm with bigeminy. She states she has been  fatigued in the past.  She has had some episodes where she would be  driving and then would not remember what happens for a short period of  time. She was assigned observation status for further evaluation and  treatment. PAST MEDICAL HISTORY:  Anxiety; atrial fibrillation; AV elfego  tachycardia, status post radiofrequency ablation; CKD; depression; dry  eyes; dry mouth; GERD; glaucoma; hyperlipidemia; hypertension;  hypothyroidism; osteoarthritis; Sjogren's syndrome; spinal stenosis. PAST SURGICAL HISTORY:  Left knee replacement, cardiac ablation,  appendectomy, tonsillectomy, wisdom teeth extraction, tubal ligation. MEDICATIONS PRIOR TO ADMISSION:  Evoxac, Flonase, Neurontin, Lopressor,  Effexor XR, Beano, zinc, Voltaren, Synthroid, vitamin D, eyelid  cleansers, Tylenol Arthritis, biotin, Plaquenil, Xalatan eyedrops,  Prevacid, erythromycin ointment, Combigan eyedrops. SOCIAL HISTORY:  No tobacco.  Occasional alcohol. REVIEW OF SYSTEMS:  Remarkable for above-stated chief complaint plus  allergies none known.     PRIMARY CARE PROVIDER:  Kendal Chandler DO    PHYSICAL EXAMINATION:  GENERAL APPEARANCE:  Reveals a 61-year-old  female who is  alert, cooperative, and a fair historian. VITAL SIGNS:  On admission, temperature 96.9, pulse 57, respirations 16,  blood pressure 181/70. HEENT:  Head:  Normocephalic, atraumatic. Eyes:  Pupils equal and  reactive to light. Extraocular muscles intact. Fundi not well  visualized. Nose:  No obstruction, polyp, or discharge noted. Mouth:   Mucosa without lesion. Pharynx:  Noninjected without exudate. NECK:  Supple. No JVD. No thyromegaly. No carotid bruits. HEART:  Regular rate and rhythm without murmur. LUNGS:  Clear to auscultation bilaterally. ABDOMEN:  Positive bowel sounds. Soft, nontender. No rebound. No  guarding. No hepatosplenomegaly. No masses. BACK:  With minimal increased thoracic kyphosis. EXTREMITIES:  Without edema. LYMPH NODES:  No adenopathy noted. SKIN:  Without rash or lesion. IMPRESSION:  Bigeminy; recurrent syncope; AV elfego reentrant  tachycardia, status post cardiac ablation; UTI; Sjogren's syndrome;  glaucoma; osteoarthritis; hypertension; hyperlipidemia; GERD; vitamin D  deficiency; hypothyroidism, on Synthroid. PLAN:  Assign the patient observation status. Urine C and S. Empiric  antibiotics. Cardiac telemetry. Cardiology to see. Discharge plan:   Home when stable.         Dio Stout DO    D: 06/16/2021 13:21:38       T: 06/16/2021 13:28:21     [id number]  Job#: [id number]     Doc#: [id number]    CC:

## 2021-06-17 ENCOUNTER — TELEPHONE (OUTPATIENT)
Dept: CARDIOLOGY CLINIC | Age: 76
End: 2021-06-17

## 2021-06-17 ENCOUNTER — TELEPHONE (OUTPATIENT)
Dept: PRIMARY CARE CLINIC | Age: 76
End: 2021-06-17

## 2021-06-17 ENCOUNTER — VIRTUAL VISIT (OUTPATIENT)
Dept: PRIMARY CARE CLINIC | Age: 76
End: 2021-06-17
Payer: MEDICARE

## 2021-06-17 DIAGNOSIS — E03.9 ACQUIRED HYPOTHYROIDISM: Primary | ICD-10-CM

## 2021-06-17 DIAGNOSIS — R55 SYNCOPE AND COLLAPSE: ICD-10-CM

## 2021-06-17 DIAGNOSIS — I49.8 BIGEMINY: ICD-10-CM

## 2021-06-17 DIAGNOSIS — I10 ESSENTIAL HYPERTENSION: Chronic | ICD-10-CM

## 2021-06-17 LAB
ORGANISM: ABNORMAL
URINE CULTURE, ROUTINE: ABNORMAL

## 2021-06-17 PROCEDURE — 99214 OFFICE O/P EST MOD 30 MIN: CPT | Performed by: FAMILY MEDICINE

## 2021-06-17 RX ORDER — NITROFURANTOIN 25; 75 MG/1; MG/1
100 CAPSULE ORAL 2 TIMES DAILY
Qty: 14 CAPSULE | Refills: 0 | Status: SHIPPED
Start: 2021-06-17 | End: 2021-07-26

## 2021-06-17 ASSESSMENT — ENCOUNTER SYMPTOMS
ALLERGIC/IMMUNOLOGIC NEGATIVE: 1
RESPIRATORY NEGATIVE: 1
EYES NEGATIVE: 1
GASTROINTESTINAL NEGATIVE: 1

## 2021-06-17 ASSESSMENT — PATIENT HEALTH QUESTIONNAIRE - PHQ9
SUM OF ALL RESPONSES TO PHQ9 QUESTIONS 1 & 2: 0
1. LITTLE INTEREST OR PLEASURE IN DOING THINGS: 0
2. FEELING DOWN, DEPRESSED OR HOPELESS: 0
SUM OF ALL RESPONSES TO PHQ QUESTIONS 1-9: 0
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
## 2021-06-21 DIAGNOSIS — R55 SYNCOPE, UNSPECIFIED SYNCOPE TYPE: Primary | ICD-10-CM

## 2021-06-21 NOTE — TELEPHONE ENCOUNTER
Yes please order echo Dx syncope and 1 mo fu    Stanton Briones D.O.   Cardiologist  Cardiology, [facility]

## 2021-06-22 ENCOUNTER — TELEPHONE (OUTPATIENT)
Dept: CARDIOLOGY | Age: 76
End: 2021-06-22

## 2021-06-22 NOTE — TELEPHONE ENCOUNTER
SCHEDULED ECHO FOR 07-28-21. REVIEWED COVID CHECKLIST WITH PATIENT.     Electronically signed by Natalya Mustafa on 6/22/2021 at 1:57 PM

## 2021-06-28 DIAGNOSIS — N30.00 ACUTE CYSTITIS WITHOUT HEMATURIA: ICD-10-CM

## 2021-06-28 DIAGNOSIS — M35.01 SJOGREN'S SYNDROME WITH KERATOCONJUNCTIVITIS SICCA (HCC): Chronic | ICD-10-CM

## 2021-06-28 DIAGNOSIS — D50.8 OTHER IRON DEFICIENCY ANEMIA: ICD-10-CM

## 2021-06-28 LAB
ALBUMIN SERPL-MCNC: 4.2 G/DL (ref 3.5–5.2)
ALP BLD-CCNC: 70 U/L (ref 35–104)
ALT SERPL-CCNC: 14 U/L (ref 0–32)
ANION GAP SERPL CALCULATED.3IONS-SCNC: 10 MMOL/L (ref 7–16)
ANISOCYTOSIS: ABNORMAL
AST SERPL-CCNC: 25 U/L (ref 0–31)
BASOPHILS ABSOLUTE: 0.07 E9/L (ref 0–0.2)
BASOPHILS RELATIVE PERCENT: 0.9 % (ref 0–2)
BILIRUB SERPL-MCNC: 0.2 MG/DL (ref 0–1.2)
BILIRUBIN URINE: NEGATIVE
BLOOD, URINE: NEGATIVE
BUN BLDV-MCNC: 30 MG/DL (ref 6–23)
BURR CELLS: ABNORMAL
CALCIUM SERPL-MCNC: 9.3 MG/DL (ref 8.6–10.2)
CHLORIDE BLD-SCNC: 106 MMOL/L (ref 98–107)
CLARITY: CLEAR
CO2: 22 MMOL/L (ref 22–29)
COLOR: YELLOW
CREAT SERPL-MCNC: 1.1 MG/DL (ref 0.5–1)
EOSINOPHILS ABSOLUTE: 0.63 E9/L (ref 0.05–0.5)
EOSINOPHILS RELATIVE PERCENT: 8.4 % (ref 0–6)
GFR AFRICAN AMERICAN: 59
GFR NON-AFRICAN AMERICAN: 48 ML/MIN/1.73
GLUCOSE BLD-MCNC: 92 MG/DL (ref 74–99)
GLUCOSE URINE: NEGATIVE MG/DL
HCT VFR BLD CALC: 31.7 % (ref 34–48)
HEMOGLOBIN: 9.1 G/DL (ref 11.5–15.5)
HYPOCHROMIA: ABNORMAL
IMMATURE GRANULOCYTES #: 0.03 E9/L
IMMATURE GRANULOCYTES %: 0.4 % (ref 0–5)
IRON: 22 MCG/DL (ref 37–145)
KETONES, URINE: NEGATIVE MG/DL
LEUKOCYTE ESTERASE, URINE: NEGATIVE
LYMPHOCYTES ABSOLUTE: 1.35 E9/L (ref 1.5–4)
LYMPHOCYTES RELATIVE PERCENT: 18 % (ref 20–42)
MCH RBC QN AUTO: 23.6 PG (ref 26–35)
MCHC RBC AUTO-ENTMCNC: 28.7 % (ref 32–34.5)
MCV RBC AUTO: 82.1 FL (ref 80–99.9)
MONOCYTES ABSOLUTE: 1.14 E9/L (ref 0.1–0.95)
MONOCYTES RELATIVE PERCENT: 15.2 % (ref 2–12)
NEUTROPHILS ABSOLUTE: 4.3 E9/L (ref 1.8–7.3)
NEUTROPHILS RELATIVE PERCENT: 57.1 % (ref 43–80)
NITRITE, URINE: NEGATIVE
OVALOCYTES: ABNORMAL
PDW BLD-RTO: 15.2 FL (ref 11.5–15)
PH UA: 5.5 (ref 5–9)
PLATELET # BLD: 266 E9/L (ref 130–450)
PMV BLD AUTO: 10.7 FL (ref 7–12)
POIKILOCYTES: ABNORMAL
POLYCHROMASIA: ABNORMAL
POTASSIUM SERPL-SCNC: 5.1 MMOL/L (ref 3.5–5)
PROTEIN UA: NEGATIVE MG/DL
RBC # BLD: 3.86 E12/L (ref 3.5–5.5)
SCHISTOCYTES: ABNORMAL
SODIUM BLD-SCNC: 138 MMOL/L (ref 132–146)
SPECIFIC GRAVITY UA: >=1.03 (ref 1–1.03)
TOTAL PROTEIN: 6.9 G/DL (ref 6.4–8.3)
UROBILINOGEN, URINE: 0.2 E.U./DL
WBC # BLD: 7.5 E9/L (ref 4.5–11.5)

## 2021-06-30 ENCOUNTER — OFFICE VISIT (OUTPATIENT)
Dept: PRIMARY CARE CLINIC | Age: 76
End: 2021-06-30
Payer: MEDICARE

## 2021-06-30 DIAGNOSIS — I49.8 BIGEMINY: ICD-10-CM

## 2021-06-30 DIAGNOSIS — I10 ESSENTIAL HYPERTENSION: Chronic | ICD-10-CM

## 2021-06-30 DIAGNOSIS — D50.8 OTHER IRON DEFICIENCY ANEMIA: Primary | ICD-10-CM

## 2021-06-30 DIAGNOSIS — R00.1 BRADYCARDIA: ICD-10-CM

## 2021-06-30 PROCEDURE — 99214 OFFICE O/P EST MOD 30 MIN: CPT | Performed by: FAMILY MEDICINE

## 2021-06-30 ASSESSMENT — ENCOUNTER SYMPTOMS
EYES NEGATIVE: 1
RESPIRATORY NEGATIVE: 1
GASTROINTESTINAL NEGATIVE: 1
ALLERGIC/IMMUNOLOGIC NEGATIVE: 1
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
## 2021-07-01 VITALS
TEMPERATURE: 98.8 F | DIASTOLIC BLOOD PRESSURE: 68 MMHG | WEIGHT: 160 LBS | HEART RATE: 54 BPM | SYSTOLIC BLOOD PRESSURE: 126 MMHG | BODY MASS INDEX: 27.46 KG/M2

## 2021-07-02 LAB
BASOPHILS ABSOLUTE: 0.07 E9/L (ref 0–0.2)
BASOPHILS RELATIVE PERCENT: 1 % (ref 0–2)
C-REACTIVE PROTEIN: 0.3 MG/DL (ref 0–0.4)
EOSINOPHILS ABSOLUTE: 0.49 E9/L (ref 0.05–0.5)
EOSINOPHILS RELATIVE PERCENT: 7 % (ref 0–6)
HCT VFR BLD CALC: 31 % (ref 34–48)
HEMOGLOBIN: 9 G/DL (ref 11.5–15.5)
IMMATURE GRANULOCYTES #: 0.02 E9/L
IMMATURE GRANULOCYTES %: 0.3 % (ref 0–5)
LYMPHOCYTES ABSOLUTE: 1.47 E9/L (ref 1.5–4)
LYMPHOCYTES RELATIVE PERCENT: 21 % (ref 20–42)
MCH RBC QN AUTO: 24.1 PG (ref 26–35)
MCHC RBC AUTO-ENTMCNC: 29 % (ref 32–34.5)
MCV RBC AUTO: 82.9 FL (ref 80–99.9)
MONOCYTES ABSOLUTE: 0.92 E9/L (ref 0.1–0.95)
MONOCYTES RELATIVE PERCENT: 13.2 % (ref 2–12)
NEUTROPHILS ABSOLUTE: 4.02 E9/L (ref 1.8–7.3)
NEUTROPHILS RELATIVE PERCENT: 57.5 % (ref 43–80)
PDW BLD-RTO: 16.6 FL (ref 11.5–15)
PLATELET # BLD: 268 E9/L (ref 130–450)
PMV BLD AUTO: 10.9 FL (ref 7–12)
RBC # BLD: 3.74 E12/L (ref 3.5–5.5)
WBC # BLD: 7 E9/L (ref 4.5–11.5)

## 2021-07-03 LAB
IGA: 193 MG/DL (ref 70–400)
IGG: 710 MG/DL (ref 700–1600)
IGM: 135 MG/DL (ref 40–230)
SEDIMENTATION RATE, ERYTHROCYTE: 13 MM/HR (ref 0–20)

## 2021-07-05 ENCOUNTER — HOSPITAL ENCOUNTER (OUTPATIENT)
Dept: NON INVASIVE DIAGNOSTICS | Age: 76
Discharge: HOME | End: 2021-07-05
Payer: MEDICARE

## 2021-07-05 DIAGNOSIS — R55 SYNCOPE, UNSPECIFIED SYNCOPE TYPE: ICD-10-CM

## 2021-07-05 LAB
LV EF: 63 %
LVEF MODALITY: NORMAL

## 2021-07-05 PROCEDURE — 93306 TTE W/DOPPLER COMPLETE: CPT

## 2021-07-06 ENCOUNTER — TELEPHONE (OUTPATIENT)
Dept: PRIMARY CARE CLINIC | Age: 76
End: 2021-07-06

## 2021-07-06 DIAGNOSIS — D50.8 OTHER IRON DEFICIENCY ANEMIA: ICD-10-CM

## 2021-07-06 LAB
ALBUMIN SERPL-MCNC: 3.5 G/DL (ref 3.5–4.7)
ALPHA-1-GLOBULIN: 0.3 G/DL (ref 0.2–0.4)
ALPHA-2-GLOBULIN: 0.9 G/FL (ref 0.5–1)
BETA GLOBULIN: 1 G/DL (ref 0.8–1.3)
CONTROL: NORMAL
ELECTROPHORESIS: NORMAL
GAMMA GLOBULIN: 0.9 G/DL (ref 0.7–1.6)
HEMOCCULT STL QL: NORMAL
TOTAL PROTEIN: 6.6 G/DL (ref 6.4–8.3)

## 2021-07-06 PROCEDURE — 82274 ASSAY TEST FOR BLOOD FECAL: CPT | Performed by: FAMILY MEDICINE

## 2021-07-15 ENCOUNTER — OFFICE VISIT (OUTPATIENT)
Dept: ONCOLOGY | Age: 76
End: 2021-07-15
Payer: MEDICARE

## 2021-07-15 ENCOUNTER — HOSPITAL ENCOUNTER (OUTPATIENT)
Dept: INFUSION THERAPY | Age: 76
Discharge: HOME | End: 2021-07-15
Payer: MEDICARE

## 2021-07-15 VITALS
OXYGEN SATURATION: 97 % | TEMPERATURE: 97.9 F | BODY MASS INDEX: 26.98 KG/M2 | WEIGHT: 157.2 LBS | DIASTOLIC BLOOD PRESSURE: 75 MMHG | HEART RATE: 43 BPM | SYSTOLIC BLOOD PRESSURE: 193 MMHG

## 2021-07-15 DIAGNOSIS — D50.9 IRON DEFICIENCY ANEMIA, UNSPECIFIED IRON DEFICIENCY ANEMIA TYPE: ICD-10-CM

## 2021-07-15 DIAGNOSIS — D50.9 IRON DEFICIENCY ANEMIA, UNSPECIFIED IRON DEFICIENCY ANEMIA TYPE: Primary | ICD-10-CM

## 2021-07-15 LAB
ALBUMIN SERPL-MCNC: 4.5 G/DL (ref 3.5–5.2)
ALP BLD-CCNC: 75 U/L (ref 35–104)
ALT SERPL-CCNC: 21 U/L (ref 0–32)
ANION GAP SERPL CALCULATED.3IONS-SCNC: 15 MMOL/L (ref 7–16)
AST SERPL-CCNC: 28 U/L (ref 0–31)
BASOPHILS ABSOLUTE: 0.09 E9/L (ref 0–0.2)
BASOPHILS RELATIVE PERCENT: 1.4 % (ref 0–2)
BILIRUB SERPL-MCNC: 0.3 MG/DL (ref 0–1.2)
BUN BLDV-MCNC: 27 MG/DL (ref 6–23)
CALCIUM SERPL-MCNC: 9.6 MG/DL (ref 8.6–10.2)
CHLORIDE BLD-SCNC: 102 MMOL/L (ref 98–107)
CO2: 23 MMOL/L (ref 22–29)
CREAT SERPL-MCNC: 1.1 MG/DL (ref 0.5–1)
DAT POLYSPECIFIC: NORMAL
EOSINOPHILS ABSOLUTE: 0.43 E9/L (ref 0.05–0.5)
EOSINOPHILS RELATIVE PERCENT: 6.5 % (ref 0–6)
FERRITIN: 15 NG/ML
FOLATE: >20 NG/ML (ref 4.8–24.2)
GFR AFRICAN AMERICAN: 59
GFR NON-AFRICAN AMERICAN: 48 ML/MIN/1.73
GLUCOSE BLD-MCNC: 91 MG/DL (ref 74–99)
HAPTOGLOBIN: 150 MG/DL (ref 30–200)
HCT VFR BLD CALC: 37.1 % (ref 34–48)
HEMOGLOBIN: 10.8 G/DL (ref 11.5–15.5)
IMMATURE GRANULOCYTES #: 0.01 E9/L
IMMATURE GRANULOCYTES %: 0.2 % (ref 0–5)
IMMATURE RETIC FRACT: 25.5 % (ref 3–15.9)
IRON SATURATION: 17 % (ref 15–50)
IRON: 80 MCG/DL (ref 37–145)
LACTATE DEHYDROGENASE: 230 U/L (ref 135–214)
LYMPHOCYTES ABSOLUTE: 1.61 E9/L (ref 1.5–4)
LYMPHOCYTES RELATIVE PERCENT: 24.5 % (ref 20–42)
MCH RBC QN AUTO: 23.9 PG (ref 26–35)
MCHC RBC AUTO-ENTMCNC: 29.1 % (ref 32–34.5)
MCV RBC AUTO: 82.1 FL (ref 80–99.9)
MONOCYTES ABSOLUTE: 0.74 E9/L (ref 0.1–0.95)
MONOCYTES RELATIVE PERCENT: 11.3 % (ref 2–12)
NEUTROPHILS ABSOLUTE: 3.69 E9/L (ref 1.8–7.3)
NEUTROPHILS RELATIVE PERCENT: 56.1 % (ref 43–80)
PATHOLOGIST REVIEW: NORMAL
PDW BLD-RTO: 17.7 FL (ref 11.5–15)
PLATELET # BLD: 278 E9/L (ref 130–450)
PMV BLD AUTO: 10 FL (ref 7–12)
POTASSIUM SERPL-SCNC: 4.5 MMOL/L (ref 3.5–5)
RBC # BLD: 4.52 E12/L (ref 3.5–5.5)
RETIC HGB EQUIVALENT: 26.1 PG (ref 28.2–36.6)
RETICULOCYTE ABSOLUTE COUNT: 0.08 E12/L
RETICULOCYTE COUNT PCT: 1.8 % (ref 0.4–1.9)
SODIUM BLD-SCNC: 140 MMOL/L (ref 132–146)
TOTAL IRON BINDING CAPACITY: 479 MCG/DL (ref 250–450)
TOTAL PROTEIN: 7.4 G/DL (ref 6.4–8.3)
TSH SERPL DL<=0.05 MIU/L-ACNC: 0.02 UIU/ML (ref 0.27–4.2)
VITAMIN B-12: 791 PG/ML (ref 211–946)
WBC # BLD: 6.6 E9/L (ref 4.5–11.5)

## 2021-07-15 PROCEDURE — 99214 OFFICE O/P EST MOD 30 MIN: CPT

## 2021-07-15 PROCEDURE — 36415 COLL VENOUS BLD VENIPUNCTURE: CPT

## 2021-07-15 PROCEDURE — 99205 OFFICE O/P NEW HI 60 MIN: CPT | Performed by: INTERNAL MEDICINE

## 2021-07-15 RX ORDER — FERROUS SULFATE 325(65) MG
325 TABLET ORAL 2 TIMES DAILY
Qty: 60 TABLET | Refills: 0 | Status: SHIPPED
Start: 2021-07-15 | End: 2021-08-11 | Stop reason: SDUPTHER

## 2021-07-15 RX ORDER — LEVOTHYROXINE SODIUM 112 UG/1
112 TABLET ORAL DAILY
Qty: 90 TABLET | Refills: 5 | Status: SHIPPED
Start: 2021-07-15 | End: 2022-10-13 | Stop reason: SDUPTHER
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
## 2021-07-16 LAB
ALBUMIN SERPL-MCNC: 3.7 G/DL (ref 3.5–4.7)
ALPHA-1-GLOBULIN: 0.2 G/DL (ref 0.2–0.4)
ALPHA-2-GLOBULIN: 0.8 G/FL (ref 0.5–1)
BETA GLOBULIN: 1.1 G/DL (ref 0.8–1.3)
ELECTROPHORESIS: NORMAL
GAMMA GLOBULIN: 1.1 G/DL (ref 0.7–1.6)
IMMUNOFIXATION RESULT, SERUM: NORMAL

## 2021-07-17 LAB
ERYTHROPOIETIN: 32 MU/ML (ref 4–27)
Lab: NORMAL
REPORT: NORMAL
THIS TEST SENT TO: NORMAL

## 2021-07-18 LAB
KAPPA FREE LIGHT CHAINS QNT: 30.18 MG/L (ref 3.3–19.4)
KAPPA/LAMBDA FREE LIGHT CHAIN RATIO: 1.31 (ref 0.26–1.65)
LAMBDA FREE LIGHT CHAINS QNT: 23.09 MG/L (ref 5.71–26.3)

## 2021-07-19 LAB
COPPER: 155.1 UG/DL (ref 80–155)
VITAMIN B6: 459.7 NMOL/L (ref 20–125)

## 2021-07-21 LAB
JAK2 GENE MUTATION QUAL: NOT DETECTED
ZINC: 169 UG/DL (ref 60–120)

## 2021-07-26 ENCOUNTER — OFFICE VISIT (OUTPATIENT)
Dept: CARDIOLOGY CLINIC | Age: 76
End: 2021-07-26
Payer: MEDICARE

## 2021-07-26 VITALS
BODY MASS INDEX: 27.21 KG/M2 | DIASTOLIC BLOOD PRESSURE: 66 MMHG | RESPIRATION RATE: 18 BRPM | WEIGHT: 159.4 LBS | HEIGHT: 64 IN | HEART RATE: 80 BPM | SYSTOLIC BLOOD PRESSURE: 108 MMHG

## 2021-07-26 DIAGNOSIS — I47.1 AVNRT (AV NODAL RE-ENTRY TACHYCARDIA) (HCC): Primary | ICD-10-CM

## 2021-07-26 PROCEDURE — 93000 ELECTROCARDIOGRAM COMPLETE: CPT | Performed by: INTERNAL MEDICINE

## 2021-07-26 PROCEDURE — 99214 OFFICE O/P EST MOD 30 MIN: CPT | Performed by: INTERNAL MEDICINE
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
## 2021-07-29 ENCOUNTER — OFFICE VISIT (OUTPATIENT)
Dept: SURGERY | Age: 76
End: 2021-07-29
Payer: MEDICARE

## 2021-07-29 VITALS
TEMPERATURE: 98 F | OXYGEN SATURATION: 97 % | HEART RATE: 39 BPM | HEIGHT: 64 IN | DIASTOLIC BLOOD PRESSURE: 77 MMHG | SYSTOLIC BLOOD PRESSURE: 188 MMHG | BODY MASS INDEX: 27.06 KG/M2 | WEIGHT: 158.5 LBS | RESPIRATION RATE: 16 BRPM

## 2021-07-29 DIAGNOSIS — D64.9 ANEMIA, UNSPECIFIED TYPE: Primary | ICD-10-CM

## 2021-07-29 PROCEDURE — 99213 OFFICE O/P EST LOW 20 MIN: CPT | Performed by: SURGERY

## 2021-07-29 NOTE — PROGRESS NOTES
[facility] Surgery Clinic Note    Assessment/Plan:     Diagnosis Orders   1. Anemia, unspecified type      We will plan for upper and lower endoscopy for evaluation. Return for Endoscopy. Chief Complaint   Patient presents with    New Patient     here for anemia.  Anemia       PCP: Ashly Sheppard DO    HPI: Paulina Goldsmith is a 77 y.o. female here for evaluation of anemia. She had hemoglobin of 9. She was placed on iron and since then has had dark stools but prior to last week did not have any dark stools. Last couple months she states maybe her stools could be a little bit narrower. She does not do any probiotics. She had EGD and colonoscopy in 2018. Showing small sliding hiatal hernia and some gastritis as well as benign-appearing gastric polyps. Her colon was unremarkable that time. She complains of some lower gas pains which she started Beano and that seems to be improving things. She has lost about 5 pounds in the last 6 or 8 weeks. There is no family history of inflammatory bowel disease. Her maternal aunt has a history of colon cancer        Review of Systems   All other systems reviewed and are negative. The remainder of the past medical, past surgical, family, and psychosocial history, as well as medication and allergy review, were completed and are as documented elsewhere in the chart. Objective:  Vitals:    07/29/21 1642   BP: (!) 188/77   Pulse: (!) 39   Resp: 16   Temp: 98 °F (36.7 °C)   TempSrc: Temporal   SpO2: 97%   Weight: 158 lb 8 oz (71.9 kg)   Height: 5' 4\" (1.626 m)          Physical Exam  Constitutional:       General: She is not in acute distress. Appearance: She is not diaphoretic. Cardiovascular:      Rate and Rhythm: Normal rate. Pulmonary:      Effort: Pulmonary effort is normal. No respiratory distress. Abdominal:      General: There is no distension. Palpations: Abdomen is soft. Tenderness:  There is no abdominal tenderness. There is no guarding or rebound. Brisa Hickey MD  7/30/2021    NOTE: This report, in part or full, may have been transcribed using voice recognition software. Every effort was made to ensure accuracy; however, inadvertent computerized transcription errors may be present. Please excuse any transcriptional grammatical or spelling errors that may have escaped my editorial review.       CC: Marcus Lopez DO, Dr. Mo Kerr

## 2021-07-30 LAB
Lab: NORMAL
REPORT: NORMAL
THIS TEST SENT TO: NORMAL

## 2021-08-02 ENCOUNTER — TELEPHONE (OUTPATIENT)
Dept: SURGERY | Age: 76
End: 2021-08-02

## 2021-08-02 NOTE — TELEPHONE ENCOUNTER
Prior Authorization Form:      DEMOGRAPHICS:                     Patient Name:  Terrence Verma  Patient :  1945            Insurance:  Payor: [employer] / Plan: [employer] / Product Type: *No Product type* /   Insurance ID Number:    Payor/Plan Subscr  Sex Relation Sub. Ins. ID Effective Group Num   1.  Teresabury 1945 Female Self [id number] 1/1/15 [id number]                                   [address]         DIAGNOSIS & PROCEDURE:                       Procedure/Operation: EGD/Colonoscopy           CPT Code: 46208/04267    Diagnosis: Anemia    ICD10 Code: D64.9    Location:  [facility]    Surgeon:  Dr. Umm Schwartz INFORMATION:                          Date: 21   Time: 1:30 PM              Anesthesia:  MAC/TIVA                                                       Status:  Outpatient        Special Comments:         Electronically signed by Gisele Ward on 2021 at 9:16 AM

## 2021-08-02 NOTE — TELEPHONE ENCOUNTER
Avelina Matute is scheduled for EGD/Colonoscopy with Dr Mariano Hearing on 9-1-21 at [facility] at 1:30 PM. Patient was told to arrive at 12:30 PM. Patient needs to be NPO after midnight the night before procedure. All surgery instructions were explained to the patient and a surgery letter was also mailed out. MA informed patient that PAT will also be calling to review pre-op instructions and medications. Patient verbalized understanding.     Electronically signed by Tierney Bowels on 8/2/21 at 9:16 AM EDT

## 2021-08-04 ENCOUNTER — TELEPHONE (OUTPATIENT)
Dept: SURGERY | Age: 76
End: 2021-08-04

## 2021-08-04 NOTE — TELEPHONE ENCOUNTER
MA informed patient of cancellation and rescheduled her EGD/colonoscopy to 08-06-21 at [facility] at 2:00 pm, arrive at 1:00 pm.    Electronically signed by Liliana Mayo MA on 8/4/2021 at 10:24 AM

## 2021-08-04 NOTE — TELEPHONE ENCOUNTER
Prior Authorization Form:      DEMOGRAPHICS:                     Patient Name:  Miriam Parra  Patient :  1945            Insurance:  Payor: Evgeny Dove / Plan: Evgeny Petty / Product Type: *No Product type* /   Insurance ID Number:    Payor/Plan Subscr  Sex Relation Sub. Ins. ID Effective Group Num   1.  Teresabury 1945 Female Self [id number] 1/1/15 [id number]                                   [address]         DIAGNOSIS & PROCEDURE:                       Procedure/Operation: EGD/colonoscopy           CPT Code: 00425/20494    Diagnosis:  Anemia    ICD10 Code: D64.9    Location:  [facility]    Surgeon:  Dr Cris Arguello INFORMATION:                          Date: 21    Time: 2:00 pm              Anesthesia:  [facility]                                                       Status:  Outpatient        Special Comments:  RS from 21       Electronically signed by Lilia Duverney, MA on 2021 at 10:25 AM

## 2021-08-05 ENCOUNTER — TELEPHONE (OUTPATIENT)
Dept: SURGERY | Age: 76
End: 2021-08-05

## 2021-08-05 NOTE — TELEPHONE ENCOUNTER
Patient called and cancelled her colonoscopy for tomorrow because she wasn't feeling well. MA offered to reschedule for 08-10-21 but patient did not want to reschedule at this time. Patient states she will call when ready to reschedule.   Electronically signed by Araceli De La Garza MA on 8/5/2021 at 10:09 AM

## 2021-08-09 ENCOUNTER — TELEPHONE (OUTPATIENT)
Dept: INFUSION THERAPY | Age: 76
End: 2021-08-09

## 2021-08-09 ENCOUNTER — TELEPHONE (OUTPATIENT)
Dept: SURGERY | Age: 76
End: 2021-08-09

## 2021-08-09 NOTE — TELEPHONE ENCOUNTER
Dr. Ashli Hall' office called. Patient cancelled appt and does not want to reschedule. Will make Dr. Linn Amezcua aware.   Rianna Benton RN 8/9/2021 [id number]

## 2021-08-09 NOTE — TELEPHONE ENCOUNTER
MA informed Dr Addison Bamberger' MA that patient cancelled her EGD/colonoscopy for [id number] and did not want to reschedule at this time.   Electronically signed by Sherren Jensen, MA on 8/9/2021 at 3:51 PM

## 2021-08-11 ENCOUNTER — TELEPHONE (OUTPATIENT)
Dept: ONCOLOGY | Age: 76
End: 2021-08-11

## 2021-08-11 ENCOUNTER — OFFICE VISIT (OUTPATIENT)
Dept: ONCOLOGY | Age: 76
End: 2021-08-11
Payer: MEDICARE

## 2021-08-11 ENCOUNTER — HOSPITAL ENCOUNTER (OUTPATIENT)
Dept: INFUSION THERAPY | Age: 76
Discharge: HOME | End: 2021-08-11
Payer: MEDICARE

## 2021-08-11 VITALS
DIASTOLIC BLOOD PRESSURE: 61 MMHG | HEART RATE: 51 BPM | OXYGEN SATURATION: 98 % | BODY MASS INDEX: 26.46 KG/M2 | SYSTOLIC BLOOD PRESSURE: 121 MMHG | WEIGHT: 155 LBS | HEIGHT: 64 IN

## 2021-08-11 DIAGNOSIS — D50.9 IRON DEFICIENCY ANEMIA, UNSPECIFIED IRON DEFICIENCY ANEMIA TYPE: Primary | ICD-10-CM

## 2021-08-11 DIAGNOSIS — D50.9 IRON DEFICIENCY ANEMIA, UNSPECIFIED IRON DEFICIENCY ANEMIA TYPE: ICD-10-CM

## 2021-08-11 LAB
ALBUMIN SERPL-MCNC: 4 G/DL (ref 3.5–5.2)
ALP BLD-CCNC: 77 U/L (ref 35–104)
ALT SERPL-CCNC: 31 U/L (ref 0–32)
ANION GAP SERPL CALCULATED.3IONS-SCNC: 9 MMOL/L (ref 7–16)
ANISOCYTOSIS: ABNORMAL
AST SERPL-CCNC: 44 U/L (ref 0–31)
BASOPHILS ABSOLUTE: 0.07 E9/L (ref 0–0.2)
BASOPHILS RELATIVE PERCENT: 0.9 % (ref 0–2)
BILIRUB SERPL-MCNC: 0.3 MG/DL (ref 0–1.2)
BUN BLDV-MCNC: 24 MG/DL (ref 6–23)
CALCIUM SERPL-MCNC: 9 MG/DL (ref 8.6–10.2)
CHLORIDE BLD-SCNC: 106 MMOL/L (ref 98–107)
CO2: 27 MMOL/L (ref 22–29)
CREAT SERPL-MCNC: 1.2 MG/DL (ref 0.5–1)
EOSINOPHILS ABSOLUTE: 0.45 E9/L (ref 0.05–0.5)
EOSINOPHILS RELATIVE PERCENT: 6.1 % (ref 0–6)
FERRITIN: 50 NG/ML
GFR AFRICAN AMERICAN: 53
GFR NON-AFRICAN AMERICAN: 44 ML/MIN/1.73
GLUCOSE BLD-MCNC: 106 MG/DL (ref 74–99)
HCT VFR BLD CALC: 41.1 % (ref 34–48)
HEMOGLOBIN: 12.7 G/DL (ref 11.5–15.5)
IRON SATURATION: 72 % (ref 15–50)
IRON: 231 MCG/DL (ref 37–145)
LYMPHOCYTES ABSOLUTE: 1.1 E9/L (ref 1.5–4)
LYMPHOCYTES RELATIVE PERCENT: 14.9 % (ref 20–42)
MCH RBC QN AUTO: 26 PG (ref 26–35)
MCHC RBC AUTO-ENTMCNC: 30.9 % (ref 32–34.5)
MCV RBC AUTO: 84.2 FL (ref 80–99.9)
MONOCYTES ABSOLUTE: 0.22 E9/L (ref 0.1–0.95)
MONOCYTES RELATIVE PERCENT: 2.6 % (ref 2–12)
NEUTROPHILS ABSOLUTE: 5.48 E9/L (ref 1.8–7.3)
NEUTROPHILS RELATIVE PERCENT: 75.4 % (ref 43–80)
OVALOCYTES: ABNORMAL
PDW BLD-RTO: 21.6 FL (ref 11.5–15)
PLATELET # BLD: 210 E9/L (ref 130–450)
PMV BLD AUTO: 9.4 FL (ref 7–12)
POIKILOCYTES: ABNORMAL
POLYCHROMASIA: ABNORMAL
POTASSIUM SERPL-SCNC: 5.1 MMOL/L (ref 3.5–5)
RBC # BLD: 4.88 E12/L (ref 3.5–5.5)
SMUDGE CELLS: ABNORMAL
SODIUM BLD-SCNC: 142 MMOL/L (ref 132–146)
TOTAL IRON BINDING CAPACITY: 321 MCG/DL (ref 250–450)
TOTAL PROTEIN: 6.5 G/DL (ref 6.4–8.3)
WBC # BLD: 7.3 E9/L (ref 4.5–11.5)

## 2021-08-11 PROCEDURE — 83540 ASSAY OF IRON: CPT

## 2021-08-11 PROCEDURE — 83550 IRON BINDING TEST: CPT

## 2021-08-11 PROCEDURE — 36415 COLL VENOUS BLD VENIPUNCTURE: CPT

## 2021-08-11 PROCEDURE — 82728 ASSAY OF FERRITIN: CPT

## 2021-08-11 PROCEDURE — 80053 COMPREHEN METABOLIC PANEL: CPT

## 2021-08-11 PROCEDURE — 85025 COMPLETE CBC W/AUTO DIFF WBC: CPT

## 2021-08-11 PROCEDURE — 99214 OFFICE O/P EST MOD 30 MIN: CPT | Performed by: INTERNAL MEDICINE

## 2021-08-11 RX ORDER — METRONIDAZOLE 250 MG/1
TABLET ORAL
COMMUNITY
Start: 2021-08-06 | End: 2021-09-29 | Stop reason: ALTCHOICE

## 2021-08-11 RX ORDER — POLYETHYLENE GLYCOL 3350 17 G/17G
POWDER, FOR SOLUTION ORAL
COMMUNITY
Start: 2021-08-06 | End: 2021-09-29 | Stop reason: ALTCHOICE

## 2021-08-11 RX ORDER — FERROUS SULFATE 325(65) MG
325 TABLET ORAL 2 TIMES DAILY
Qty: 60 TABLET | Refills: 1 | Status: SHIPPED
Start: 2021-08-11 | End: 2021-08-11 | Stop reason: SDUPTHER

## 2021-08-11 RX ORDER — PANTOPRAZOLE SODIUM 40 MG/1
TABLET, DELAYED RELEASE ORAL
COMMUNITY
Start: 2021-08-05 | End: 2022-02-02

## 2021-08-11 RX ORDER — FERROUS SULFATE 325(65) MG
325 TABLET ORAL 2 TIMES DAILY
Qty: 60 TABLET | Refills: 1 | Status: SHIPPED
Start: 2021-08-11 | End: 2021-12-22 | Stop reason: SDUPTHER

## 2021-08-11 NOTE — TELEPHONE ENCOUNTER
Call placed to patient on above number  No answer message left regarding iron supplement  Patient informed to only take 1 tablet per day instead of 2. . instructed to call with ant questions or problems.   Office number given [phone]

## 2021-08-11 NOTE — PROGRESS NOTES
Department of [facility] Oncology  Attending Clinic Note    Reason for Visit: Follow-up on a patient with Iron deficiency anemia    PCP:  Earle Rain DO    History of Present Illness:  78 y/o female with hx of hyperlipidemia, hypertension, hypothyroidism, sjogren's syndrome, chronic kidney disease, A. Fib, osteoarthritis, anxiety, depression, who was noted to have iron deficiency anemia on routine blood work  On 07/02/2021: Hb 9.0  Hct 31.0  MCV 82.9    WBC 7.0  ALC 1.47  Rest of diff normal  BUN 30  Creat 1.1  Ca 9.3  Albumin 4.2  ESR, CRP WNL  IgA, IgG, IgM WNL  SPEP: Normal. No monoclonal protein identified. Fe 22 ()  FIT negative    Last EGD/Colonoscopy on 10/05/2018 mild gastroduodenitis, small sliding hiatal hernia  A.  Duodenum, biopsy: No pathologic alterations   B.  Stomach, biopsy: Moderate chronic gastritis; negative for intestinal metaplasia   Immunostain negative for Helicobacter pylori organisms   C.  Stomach, biopsy: Fundic gland polyp with mild chronic inflammation; negative for intestinal metaplasia   Immunostain negative for Helicobacter pylori organisms   D.  Esophagus, biopsy: Superficial fragments of unremarkable squamous epithelium   E.  Terminal ileum, biopsy: No pathologic alterations   F.  Random colon, biopsy:  No pathologic alterations    Labs 07/15/2021 reviewed. Borderline microcytic anemia consistent with iron deficiency anemia. F/U with PCP for abnormal TSH  GI team (Dr. Syed Segundo) on board for EGD/Colonoscopy,capsule endoscopy  FeSO4 325 mg po bid was started on 07/15/2021 with good tolerance so far. Energy level improved. On 08/11/2021: Hb 12.1  Hct 41.1  MCV 84.2     WBC 7.3    BUN 24  Creat 1.2   Fe 231 TIBC 321 FeSat 72%  Ferritin 50  Change FeSO4 325 mg po daily    Review of Systems;  CONSTITUTIONAL: No fever, chills. Fair appetite. Energy level improved. ENMT: Eyes: No diplopia; Nose: No epistaxis. Mouth: No sore throat.   RESPIRATORY: No hemoptysis, shortness of breath, cough. CARDIOVASCULAR: No chest pain, palpitations. GASTROINTESTINAL: No nausea/vomiting, abdominal pain  GENITOURINARY: No dysuria, urinary frequency, hematuria. NEURO: No syncope, presyncope, headache. Remainder:  ROS NEGATIVE    Past Medical History:      Diagnosis Date    ANS (arteriolar nephrosclerosis)     Anxiety 2018    Arrhythmia 1995    Atrial Fibrillation 1995, Reentrant AV elfego tachycardia, radiofrequency ablation March 1999 Valdemar Mace  / follow with Dr. Ruelas Catching every year    Atrial fibrillation [facility])     AVNRT (AV elfego re-entry tachycardia) ([facility] 2008?    s/p ablation /resolved; noted 9/28/18- had recent fast heart rate & hypertensoion & seeing Dr. Emile Samano  9/28/18    Chronic kidney disease 05/2017    Depression     Dry eyes     Dry mouth     Fatigue     GERD (gastroesophageal reflux disease)     Glaucoma     left eyes    Glaucoma     HH (hiatus hernia)     Hyperlipidemia     Hypertension     Hypothyroidism     Menopause     Osteoarthritis     [facility]'s syndrome ([facility]     Spinal stenosis     at lower back    Stool incontinence 09/2018    [facility]     Tinnitus     Tremor      Medications:  Reviewed and reconciled. Allergies:  No Known Allergies    Physical Exam:  Ht 5' 4\" (1.626 m)   Wt 155 lb (70.3 kg)   BMI 26.61 kg/m²   GENERAL: Alert, oriented x 3, not in acute distress. HEENT: PERRLA; EOMI. Oropharynx clear. NECK: Supple. Without lymphadenopathy. LUNGS: Good air entry bilaterally. No wheezing, crackles or ronchi. CARDIOVASCULAR: Regular rate. No murmurs, rubs or gallops. ABDOMEN: Soft. Non-tender, non-distended. Positive bowel sounds. EXTREMITIES: Without clubbing, cyanosis, or edema. NEUROLOGIC: No focal deficits.      Impression/Plan:  78 y/o female with hx of hyperlipidemia, hypertension, hypothyroidism, sjogren's syndrome, chronic kidney disease, A. Fib, osteoarthritis, anxiety, depression, who was noted to have iron deficiency anemia on routine blood work  On07/02/2021: Hb 9.0  Hct 31.0  MCV 82.9    WBC 7.0  ALC 1.47  Rest of diff normal  BUN 30  Creat 1.1  Ca 9.3  Albumin 4.2  ESR, CRP WNL  IgA, IgG, IgM WNL  SPEP: Normal. No monoclonal protein identified. Fe 22 ()  FIT negative    Last EGD/Colonoscopy on 10/05/2018 mild gastroduodenitis, small sliding hiatal hernia  A.  Duodenum, biopsy: No pathologic alterations   B.  Stomach, biopsy: Moderate chronic gastritis; negative for intestinal metaplasia   Immunostain negative for Helicobacter pylori organisms   C.  Stomach, biopsy: Fundic gland polyp with mild chronic inflammation; negative for intestinal metaplasia   Immunostain negative for Helicobacter pylori organisms   D.  Esophagus, biopsy: Superficial fragments of unremarkable squamous epithelium   E.  Terminal ileum, biopsy: No pathologic alterations   F.  Random colon, biopsy: No pathologic alterations    Labs 07/15/2021 reviewed. Borderline microcytic anemia consistent with iron deficiency anemia. F/U with PCP for abnormal TSH  GI team (Dr. Henrique Knutson) on board for EGD/Colonoscopy,capsule endoscopy  FeSO4 325 mg po bid was started on 07/15/2021 with good tolerance so far. Energy level improved.    On 08/11/2021: Hb 12.1  Hct 41.1  MCV 84.2     WBC 7.3    BUN 24  Creat 1.2   Fe 231 TIBC 321 FeSat 72%  Ferritin 50  Change FeSO4 325 mg po daily    RTC 6 weeks with prior labs    Luca Blake MD   8/11/2021

## 2021-08-23 ENCOUNTER — TELEPHONE (OUTPATIENT)
Dept: FAMILY MEDICINE CLINIC | Age: 76
End: 2021-08-23

## 2021-08-23 NOTE — TELEPHONE ENCOUNTER
Patient called to see if we received clearance form for cataract surgery from [address] [place] [address]. Advised patient there is nothing in mail/fax regarding cataract surgery. She is going to contact [facility] and have form faxed over.

## 2021-08-24 ENCOUNTER — TELEPHONE (OUTPATIENT)
Dept: FAMILY MEDICINE CLINIC | Age: 76
End: 2021-08-24

## 2021-08-24 NOTE — TELEPHONE ENCOUNTER
Received pt's cataract pre-op clearance form this morning. Dr. Facundo Meza would like to see pt in office before completing form. Lm for pt to call and schedule appointment.

## 2021-08-25 ENCOUNTER — OFFICE VISIT (OUTPATIENT)
Dept: PRIMARY CARE CLINIC | Age: 76
End: 2021-08-25
Payer: MEDICARE

## 2021-08-25 VITALS
WEIGHT: 157 LBS | DIASTOLIC BLOOD PRESSURE: 74 MMHG | SYSTOLIC BLOOD PRESSURE: 128 MMHG | TEMPERATURE: 97.8 F | BODY MASS INDEX: 26.8 KG/M2 | HEIGHT: 64 IN

## 2021-08-25 DIAGNOSIS — Z01.818 PRE-OP EXAMINATION: ICD-10-CM

## 2021-08-25 DIAGNOSIS — E11.9 DIET-CONTROLLED DIABETES MELLITUS (HCC): ICD-10-CM

## 2021-08-25 DIAGNOSIS — D50.8 OTHER IRON DEFICIENCY ANEMIA: ICD-10-CM

## 2021-08-25 DIAGNOSIS — M81.0 AGE-RELATED OSTEOPOROSIS WITHOUT CURRENT PATHOLOGICAL FRACTURE: ICD-10-CM

## 2021-08-25 DIAGNOSIS — E03.9 ACQUIRED HYPOTHYROIDISM: ICD-10-CM

## 2021-08-25 DIAGNOSIS — I10 ESSENTIAL HYPERTENSION: Primary | Chronic | ICD-10-CM

## 2021-08-25 DIAGNOSIS — M15.9 PRIMARY OSTEOARTHRITIS INVOLVING MULTIPLE JOINTS: Chronic | ICD-10-CM

## 2021-08-25 PROBLEM — D64.9 ABSOLUTE ANEMIA: Status: ACTIVE | Noted: 2021-08-25

## 2021-08-25 PROCEDURE — 99214 OFFICE O/P EST MOD 30 MIN: CPT | Performed by: FAMILY MEDICINE

## 2021-08-25 ASSESSMENT — PATIENT HEALTH QUESTIONNAIRE - PHQ9
SUM OF ALL RESPONSES TO PHQ QUESTIONS 1-9: 0
2. FEELING DOWN, DEPRESSED OR HOPELESS: 0
SUM OF ALL RESPONSES TO PHQ QUESTIONS 1-9: 0
SUM OF ALL RESPONSES TO PHQ9 QUESTIONS 1 & 2: 0
SUM OF ALL RESPONSES TO PHQ QUESTIONS 1-9: 0
1. LITTLE INTEREST OR PLEASURE IN DOING THINGS: 0

## 2021-08-25 ASSESSMENT — ENCOUNTER SYMPTOMS
RESPIRATORY NEGATIVE: 1
EYES NEGATIVE: 1
GASTROINTESTINAL NEGATIVE: 1
ALLERGIC/IMMUNOLOGIC NEGATIVE: 1
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
## 2021-09-07 ENCOUNTER — TELEPHONE (OUTPATIENT)
Dept: PRIMARY CARE CLINIC | Age: 76
End: 2021-09-07

## 2021-09-08 NOTE — TELEPHONE ENCOUNTER
Her kidney function is not normal and it is not safe with abnormal kidney function.   We talked about in the past not taking any anti-inflammatory meds such as Advil or Aleve or diclofenac

## 2021-09-21 ENCOUNTER — HOSPITAL ENCOUNTER (OUTPATIENT)
Dept: MAMMOGRAPHY | Age: 76
Discharge: HOME | End: 2021-09-23
Payer: MEDICARE

## 2021-09-21 DIAGNOSIS — Z12.31 VISIT FOR SCREENING MAMMOGRAM: ICD-10-CM

## 2021-09-21 PROCEDURE — 77063 BREAST TOMOSYNTHESIS BI: CPT

## 2021-09-24 LAB
ALBUMIN SERPL-MCNC: 4.5 G/DL (ref 3.5–5.2)
ALP BLD-CCNC: 90 U/L (ref 35–104)
ALT SERPL-CCNC: 17 U/L (ref 0–32)
ANION GAP SERPL CALCULATED.3IONS-SCNC: 12 MMOL/L (ref 7–16)
AST SERPL-CCNC: 25 U/L (ref 0–31)
BASOPHILS ABSOLUTE: 0.06 E9/L (ref 0–0.2)
BASOPHILS RELATIVE PERCENT: 0.9 % (ref 0–2)
BILIRUB SERPL-MCNC: 0.3 MG/DL (ref 0–1.2)
BUN BLDV-MCNC: 21 MG/DL (ref 6–23)
CALCIUM SERPL-MCNC: 9.8 MG/DL (ref 8.6–10.2)
CHLORIDE BLD-SCNC: 104 MMOL/L (ref 98–107)
CO2: 27 MMOL/L (ref 22–29)
CREAT SERPL-MCNC: 0.8 MG/DL (ref 0.5–1)
EOSINOPHILS ABSOLUTE: 0.42 E9/L (ref 0.05–0.5)
EOSINOPHILS RELATIVE PERCENT: 6.6 % (ref 0–6)
FERRITIN: 58 NG/ML
FOLATE: >20 NG/ML (ref 4.8–24.2)
GFR AFRICAN AMERICAN: >60
GFR NON-AFRICAN AMERICAN: >60 ML/MIN/1.73
GLUCOSE BLD-MCNC: 86 MG/DL (ref 74–99)
HCT VFR BLD CALC: 45.9 % (ref 34–48)
HEMOGLOBIN: 14.6 G/DL (ref 11.5–15.5)
IGA: 207 MG/DL (ref 70–400)
IMMATURE GRANULOCYTES #: 0.01 E9/L
IMMATURE GRANULOCYTES %: 0.2 % (ref 0–5)
IRON SATURATION: 15 % (ref 15–50)
IRON: 56 MCG/DL (ref 37–145)
LYMPHOCYTES ABSOLUTE: 1.59 E9/L (ref 1.5–4)
LYMPHOCYTES RELATIVE PERCENT: 24.8 % (ref 20–42)
MCH RBC QN AUTO: 27.8 PG (ref 26–35)
MCHC RBC AUTO-ENTMCNC: 31.8 % (ref 32–34.5)
MCV RBC AUTO: 87.4 FL (ref 80–99.9)
MONOCYTES ABSOLUTE: 0.62 E9/L (ref 0.1–0.95)
MONOCYTES RELATIVE PERCENT: 9.7 % (ref 2–12)
NEUTROPHILS ABSOLUTE: 3.71 E9/L (ref 1.8–7.3)
NEUTROPHILS RELATIVE PERCENT: 57.8 % (ref 43–80)
PDW BLD-RTO: 18.9 FL (ref 11.5–15)
PLATELET # BLD: 224 E9/L (ref 130–450)
PMV BLD AUTO: 10.2 FL (ref 7–12)
POTASSIUM SERPL-SCNC: 4.6 MMOL/L (ref 3.5–5)
RBC # BLD: 5.25 E12/L (ref 3.5–5.5)
SODIUM BLD-SCNC: 143 MMOL/L (ref 132–146)
TOTAL IRON BINDING CAPACITY: 368 MCG/DL (ref 250–450)
TOTAL PROTEIN: 7.4 G/DL (ref 6.4–8.3)
VITAMIN B-12: 999 PG/ML (ref 211–946)
WBC # BLD: 6.4 E9/L (ref 4.5–11.5)

## 2021-09-29 ENCOUNTER — OFFICE VISIT (OUTPATIENT)
Dept: ONCOLOGY | Age: 76
End: 2021-09-29
Payer: MEDICARE

## 2021-09-29 ENCOUNTER — HOSPITAL ENCOUNTER (OUTPATIENT)
Dept: INFUSION THERAPY | Age: 76
Discharge: HOME | End: 2021-09-29
Payer: MEDICARE

## 2021-09-29 VITALS
TEMPERATURE: 97.2 F | OXYGEN SATURATION: 96 % | BODY MASS INDEX: 26.81 KG/M2 | RESPIRATION RATE: 16 BRPM | DIASTOLIC BLOOD PRESSURE: 75 MMHG | HEART RATE: 94 BPM | SYSTOLIC BLOOD PRESSURE: 156 MMHG | WEIGHT: 156.2 LBS

## 2021-09-29 DIAGNOSIS — D50.9 IRON DEFICIENCY ANEMIA, UNSPECIFIED IRON DEFICIENCY ANEMIA TYPE: Primary | ICD-10-CM

## 2021-09-29 PROCEDURE — 99214 OFFICE O/P EST MOD 30 MIN: CPT | Performed by: INTERNAL MEDICINE

## 2021-09-29 PROCEDURE — 99212 OFFICE O/P EST SF 10 MIN: CPT

## 2021-09-29 NOTE — PROGRESS NOTES
Department of [facility] Oncology  Attending Clinic Note    Reason for Visit: Follow-up on a patient with Iron deficiency anemia    PCP:  Yaya Rain DO    History of Present Illness:  76 y/o female with hx of hyperlipidemia, hypertension, hypothyroidism, sjogren's syndrome, chronic kidney disease, A. Fib, osteoarthritis, anxiety, depression, who was noted to have iron deficiency anemia on routine blood work  On 07/02/2021: Hb 9.0  Hct 31.0  MCV 82.9    WBC 7.0  ALC 1.47  Rest of diff normal  BUN 30  Creat 1.1  Ca 9.3  Albumin 4.2  ESR, CRP WNL  IgA, IgG, IgM WNL  SPEP: Normal. No monoclonal protein identified. Fe 22 ()  FIT negative    Last EGD/Colonoscopy on 10/05/2018 mild gastroduodenitis, small sliding hiatal hernia  A.  Duodenum, biopsy: No pathologic alterations   B.  Stomach, biopsy: Moderate chronic gastritis; negative for intestinal metaplasia   Immunostain negative for Helicobacter pylori organisms   C.  Stomach, biopsy: Fundic gland polyp with mild chronic inflammation; negative for intestinal metaplasia   Immunostain negative for Helicobacter pylori organisms   D.  Esophagus, biopsy: Superficial fragments of unremarkable squamous epithelium   E.  Terminal ileum, biopsy: No pathologic alterations   F.  Random colon, biopsy:  No pathologic alterations    Labs 07/15/2021 reviewed. Borderline microcytic anemia consistent with iron deficiency anemia. F/U with PCP for abnormal TSH  GI team (Dr. Hannah Wolfe) on board for EGD/Colonoscopy,capsule endoscopy  FeSO4 325 mg po bid was started on 07/15/2021 with good tolerance so far. Energy level improved. On 08/11/2021: Hb 12.1  Hct 41.1  MCV 84.2     WBC 7.3    BUN 24  Creat 1.2   Fe 231 TIBC 321 FeSat 72%  Ferritin 50  Changed FeSO4 325 mg po daily    EGD/colonoscopy on 9/22/2021 LA B erosive esophagitis with peptic stricture. Biopsies were done.   Dilation was done with a 15 to 18 mm through-the-scope CRE balloon in sequential fashion  4-5 centimeters hiatal hernia with no Clement erosions or lesions  Mild gastritis. Biopsied for H. Pylori  Normal-appearing duodenum with biopsies to rule out celiac  Colon polyps x2 removed by forceps  Diverticulosis mild in the sigmoid with no stigmata of bleeding  Small internal and external hemorrhoids    Pathology records requested  Continue Protonix 40 mg in the morning  Recommend repeat colonoscopy in 5 years pending pathology if in good health  Capsule endoscopy pending    Review of Systems;  CONSTITUTIONAL: No fever, chills. Fair appetite and energy level. ENMT: Eyes: No diplopia; Nose: No epistaxis. Mouth: No sore throat. RESPIRATORY: No hemoptysis, shortness of breath, cough. CARDIOVASCULAR: No chest pain, palpitations. GASTROINTESTINAL: No nausea/vomiting, abdominal pain  GENITOURINARY: No dysuria, urinary frequency, hematuria. NEURO: No syncope, presyncope, headache. Remainder:  ROS NEGATIVE    Past Medical History:      Diagnosis Date    ANS (arteriolar nephrosclerosis)     Anxiety 2018    Arrhythmia 1995    Atrial Fibrillation 1995, Reentrant AV elfego tachycardia, radiofrequency ablation March 1999 Paula Gutierrez  / follow with Dr. Taylor Armando every year    Atrial fibrillation [facility])     AVNRT (AV elfego re-entry tachycardia) ([facility] [address] 2008?    s/p ablation /resolved; noted 9/28/18- had recent fast heart rate & hypertensoion & seeing Dr. Queen Stoddard  9/28/18    Chronic kidney disease 05/2017    Depression     Dry eyes     Dry mouth     Fatigue     GERD (gastroesophageal reflux disease)     Glaucoma     left eyes    Glaucoma     HH (hiatus hernia)     Hyperlipidemia     Hypertension     Hypothyroidism     Menopause     Osteoarthritis     Sjogren's syndrome ([facility] [address]     Spinal stenosis     at lower back    Stool incontinence 09/2018    SVT (supraventricular tachycardia) (HCC)     Tinnitus     Tremor      Medications:  Reviewed and reconciled.     Allergies:  No Known Allergies    Physical Exam:  BP (!) 156/75   Pulse 94   Temp 97.2 °F (36.2 °C)   Resp 16   Wt 156 lb 3.2 oz (70.9 kg)   SpO2 96%   BMI 26.81 kg/m²   GENERAL: Alert, oriented x 3, not in acute distress. HEENT: PERRLA; EOMI. Oropharynx clear. NECK: Supple. Without lymphadenopathy. LUNGS: Good air entry bilaterally. No wheezing, crackles or ronchi. CARDIOVASCULAR: Regular rate. No murmurs, rubs or gallops. ABDOMEN: Soft. Non-tender, non-distended. Positive bowel sounds. EXTREMITIES: Without clubbing, cyanosis, or edema. NEUROLOGIC: No focal deficits. Impression/Plan:  76 y/o female with hx of hyperlipidemia, hypertension, hypothyroidism, sjogren's syndrome, chronic kidney disease, A. Fib, osteoarthritis, anxiety, depression, who was noted to have iron deficiency anemia on routine blood work  On07/02/2021: Hb 9.0  Hct 31.0  MCV 82.9    WBC 7.0  ALC 1.47  Rest of diff normal  BUN 30  Creat 1.1  Ca 9.3  Albumin 4.2  ESR, CRP WNL  IgA, IgG, IgM WNL  SPEP: Normal. No monoclonal protein identified. Fe 22 ()  FIT negative    Last EGD/Colonoscopy on 10/05/2018 mild gastroduodenitis, small sliding hiatal hernia  A.  Duodenum, biopsy: No pathologic alterations   B.  Stomach, biopsy: Moderate chronic gastritis; negative for intestinal metaplasia   Immunostain negative for Helicobacter pylori organisms   C.  Stomach, biopsy: Fundic gland polyp with mild chronic inflammation; negative for intestinal metaplasia   Immunostain negative for Helicobacter pylori organisms   D.  Esophagus, biopsy: Superficial fragments of unremarkable squamous epithelium   E.  Terminal ileum, biopsy: No pathologic alterations   F.  Random colon, biopsy: No pathologic alterations    Labs 07/15/2021 reviewed. Borderline microcytic anemia consistent with iron deficiency anemia.  F/U with PCP for abnormal TSH  GI team (Dr. Dez Nguyễn) on board for EGD/Colonoscopy,capsule endoscopy  FeSO4 325 mg po bid was started on 07/15/2021 with good tolerance so far. Energy level improved. On 08/11/2021: Hb 12.1  Hct 41.1  MCV 84.2     WBC 7.3    BUN 24  Creat 1.2   Fe 231 TIBC 321 FeSat 72%  Ferritin 50  Changed FeSO4 325 mg po daily    EGD/colonoscopy on 9/22/2021 LA B erosive esophagitis with peptic stricture. Biopsies were done. Dilation was done with a 15 to 18 mm through-the-scope CRE balloon in sequential fashion  4-5 centimeters hiatal hernia with no Clement erosions or lesions  Mild gastritis. Biopsied for H. Pylori  Normal-appearing duodenum with biopsies to rule out celiac  Colon polyps x2 removed by forceps  Diverticulosis mild in the sigmoid with no stigmata of bleeding  Small internal and external hemorrhoids    Pathology records requested  Continue Protonix 40 mg in the morning  Recommend repeat colonoscopy in 5 years pending pathology if in good health  Capsule endoscopy pending    On 09/24/2021: Hb 14.6   Hct 45.9  MCV 87.4    WBC 6.4  BUN 21  Creat 0.8  Fe 56 TIBC 368  FeSat 15% Ferritin 58   Folate >20; VitB12 999  Continue FeSO4 325 mg po daily.     RTC 8 weeks with prior labs    Cherylene Guarneri, MD   9/29/2021

## 2021-09-30 LAB
TISSUE TRANSGLUTAMINASE ANTIBODY IGG: 0.8 U/ML
TISSUE TRANSGLUTAMINASE IGA: 0.7 U/ML

## 2021-10-01 DIAGNOSIS — J30.2 SEASONAL ALLERGIC RHINITIS, UNSPECIFIED TRIGGER: ICD-10-CM

## 2021-10-01 RX ORDER — FLUTICASONE PROPIONATE 50 MCG
2 SPRAY, SUSPENSION (ML) NASAL
Qty: 16 G | Refills: 12 | Status: SHIPPED
Start: 2021-10-01 | End: 2022-10-31 | Stop reason: SDUPTHER

## 2021-10-03 ENCOUNTER — TELEPHONE (OUTPATIENT)
Dept: PRIMARY CARE CLINIC | Age: 76
End: 2021-10-03

## 2021-11-24 ENCOUNTER — OFFICE VISIT (OUTPATIENT)
Dept: ONCOLOGY | Age: 76
End: 2021-11-24
Payer: MEDICARE

## 2021-11-24 ENCOUNTER — HOSPITAL ENCOUNTER (OUTPATIENT)
Dept: INFUSION THERAPY | Age: 76
Discharge: HOME | End: 2021-11-24
Payer: MEDICARE

## 2021-11-24 VITALS
HEIGHT: 64 IN | OXYGEN SATURATION: 96 % | BODY MASS INDEX: 25.74 KG/M2 | DIASTOLIC BLOOD PRESSURE: 81 MMHG | WEIGHT: 150.8 LBS | TEMPERATURE: 97.3 F | HEART RATE: 70 BPM | SYSTOLIC BLOOD PRESSURE: 186 MMHG

## 2021-11-24 DIAGNOSIS — D64.9 ANEMIA, UNSPECIFIED TYPE: ICD-10-CM

## 2021-11-24 DIAGNOSIS — D50.9 IRON DEFICIENCY ANEMIA, UNSPECIFIED IRON DEFICIENCY ANEMIA TYPE: ICD-10-CM

## 2021-11-24 DIAGNOSIS — D50.9 IRON DEFICIENCY ANEMIA, UNSPECIFIED IRON DEFICIENCY ANEMIA TYPE: Primary | ICD-10-CM

## 2021-11-24 LAB
ALBUMIN SERPL-MCNC: 4.4 G/DL (ref 3.5–5.2)
ALP BLD-CCNC: 82 U/L (ref 35–104)
ALT SERPL-CCNC: 18 U/L (ref 0–32)
ANION GAP SERPL CALCULATED.3IONS-SCNC: 12 MMOL/L (ref 7–16)
AST SERPL-CCNC: 27 U/L (ref 0–31)
BASOPHILS ABSOLUTE: 0.06 E9/L (ref 0–0.2)
BASOPHILS RELATIVE PERCENT: 0.8 % (ref 0–2)
BILIRUB SERPL-MCNC: 0.4 MG/DL (ref 0–1.2)
BUN BLDV-MCNC: 22 MG/DL (ref 6–23)
CALCIUM SERPL-MCNC: 9.8 MG/DL (ref 8.6–10.2)
CHLORIDE BLD-SCNC: 105 MMOL/L (ref 98–107)
CO2: 25 MMOL/L (ref 22–29)
CREAT SERPL-MCNC: 0.7 MG/DL (ref 0.5–1)
EOSINOPHILS ABSOLUTE: 0.43 E9/L (ref 0.05–0.5)
EOSINOPHILS RELATIVE PERCENT: 6 % (ref 0–6)
FERRITIN: 120 NG/ML
GFR AFRICAN AMERICAN: >60
GFR NON-AFRICAN AMERICAN: >60 ML/MIN/1.73
GLUCOSE BLD-MCNC: 83 MG/DL (ref 74–99)
HCT VFR BLD CALC: 45.2 % (ref 34–48)
HEMOGLOBIN: 14.5 G/DL (ref 11.5–15.5)
IMMATURE GRANULOCYTES #: 0.01 E9/L
IMMATURE GRANULOCYTES %: 0.1 % (ref 0–5)
IRON SATURATION: 35 % (ref 15–50)
IRON: 114 MCG/DL (ref 37–145)
LYMPHOCYTES ABSOLUTE: 1.81 E9/L (ref 1.5–4)
LYMPHOCYTES RELATIVE PERCENT: 25.1 % (ref 20–42)
MCH RBC QN AUTO: 29.4 PG (ref 26–35)
MCHC RBC AUTO-ENTMCNC: 32.1 % (ref 32–34.5)
MCV RBC AUTO: 91.7 FL (ref 80–99.9)
MONOCYTES ABSOLUTE: 0.82 E9/L (ref 0.1–0.95)
MONOCYTES RELATIVE PERCENT: 11.4 % (ref 2–12)
NEUTROPHILS ABSOLUTE: 4.07 E9/L (ref 1.8–7.3)
NEUTROPHILS RELATIVE PERCENT: 56.6 % (ref 43–80)
PDW BLD-RTO: 14.6 FL (ref 11.5–15)
PLATELET # BLD: 198 E9/L (ref 130–450)
PMV BLD AUTO: 10.3 FL (ref 7–12)
POTASSIUM SERPL-SCNC: 5 MMOL/L (ref 3.5–5)
RBC # BLD: 4.93 E12/L (ref 3.5–5.5)
SODIUM BLD-SCNC: 142 MMOL/L (ref 132–146)
TOTAL IRON BINDING CAPACITY: 328 MCG/DL (ref 250–450)
TOTAL PROTEIN: 6.8 G/DL (ref 6.4–8.3)
WBC # BLD: 7.2 E9/L (ref 4.5–11.5)

## 2021-11-24 PROCEDURE — 82728 ASSAY OF FERRITIN: CPT

## 2021-11-24 PROCEDURE — 85025 COMPLETE CBC W/AUTO DIFF WBC: CPT

## 2021-11-24 PROCEDURE — 99212 OFFICE O/P EST SF 10 MIN: CPT

## 2021-11-24 PROCEDURE — 83540 ASSAY OF IRON: CPT

## 2021-11-24 PROCEDURE — 83550 IRON BINDING TEST: CPT

## 2021-11-24 PROCEDURE — 36415 COLL VENOUS BLD VENIPUNCTURE: CPT

## 2021-11-24 PROCEDURE — 99214 OFFICE O/P EST MOD 30 MIN: CPT | Performed by: INTERNAL MEDICINE

## 2021-11-24 PROCEDURE — 80053 COMPREHEN METABOLIC PANEL: CPT

## 2021-11-24 NOTE — PROGRESS NOTES
[facility]  Attending Clinic Note    Reason for Visit: Follow-up on a patient with Iron deficiency anemia    PCP:  Gris Rain DO    History of Present Illness:  76 y/o female with hx of hyperlipidemia, hypertension, hypothyroidism, sjogren's syndrome, chronic kidney disease, A. Fib, osteoarthritis, anxiety, depression, who was noted to have iron deficiency anemia on routine blood work  On 07/02/2021: Hb 9.0  Hct 31.0  MCV 82.9    WBC 7.0  ALC 1.47  Rest of diff normal  BUN 30  Creat 1.1  Ca 9.3  Albumin 4.2  ESR, CRP WNL  IgA, IgG, IgM WNL  SPEP: Normal. No monoclonal protein identified. Fe 22 ()  FIT negative    Last EGD/Colonoscopy on 10/05/2018 mild gastroduodenitis, small sliding hiatal hernia  A.  Duodenum, biopsy: No pathologic alterations   B.  Stomach, biopsy: Moderate chronic gastritis; negative for intestinal metaplasia   Immunostain negative for Helicobacter pylori organisms   C.  Stomach, biopsy: Fundic gland polyp with mild chronic inflammation; negative for intestinal metaplasia   Immunostain negative for Helicobacter pylori organisms   D.  Esophagus, biopsy: Superficial fragments of unremarkable squamous epithelium   E.  Terminal ileum, biopsy: No pathologic alterations   F.  Random colon, biopsy:  No pathologic alterations    Labs 07/15/2021 reviewed. Borderline microcytic anemia consistent with iron deficiency anemia. F/U with PCP for abnormal TSH  GI team (Dr. Cornell Carpio) on board for EGD/Colonoscopy,capsule endoscopy  FeSO4 325 mg po bid was started on 07/15/2021 with good tolerance so far. Energy level improved. On 08/11/2021: Hb 12.1  Hct 41.1  MCV 84.2     WBC 7.3    BUN 24  Creat 1.2   Fe 231 TIBC 321 FeSat 72%  Ferritin 50  Changed FeSO4 325 mg po daily    EGD/colonoscopy on 9/22/2021 LA B erosive esophagitis with peptic stricture. Biopsies were done.   Dilation was done with a 15 to 18 mm through-the-scope CRE balloon in sequential fashion  4-5 centimeters hiatal hernia with no Clement erosions or lesions  Mild gastritis. Biopsied for H. Pylori  Normal-appearing duodenum with biopsies to rule out celiac  Colon polyps x2 removed by forceps  Diverticulosis mild in the sigmoid with no stigmata of bleeding  Small internal and external hemorrhoids    A. Small bowel biopsy: Small intestinal mucosa with an intact villous architecture showing no significant pathologic changes. B.  Stomach biopsy: Antral and oxyntic mucosa with mild chronic gastritis. No Helicobacter pylori-like organisms identified on H&E stain  C.  Esophagus biopsy: Esophageal squamous mucosa with findings suggestive of reflux esophagitis  D. Descending colon polyp polypectomy: Tubular adenoma  E. Sigmoid colon polyp polypectomy: Incipient tubular adenoma    On 09/24/2021: Hb 14.6   Hct 45.9  MCV 87.4    WBC 6.4  BUN 21  Creat 0.8  Fe 56 TIBC 368  FeSat 15% Ferritin 58   Folate >20; VitB12 999  Continued FeSO4 325 mg po daily. Today 11/24/2021; No fever, chills. Fair appetite and energy level    Review of Systems;  CONSTITUTIONAL: No fever, chills. Fair appetite and energy level. ENMT: Eyes: No diplopia; Nose: No epistaxis. Mouth: No sore throat. RESPIRATORY: No hemoptysis, shortness of breath, cough. CARDIOVASCULAR: No chest pain, palpitations. GASTROINTESTINAL: No nausea/vomiting, abdominal pain  GENITOURINARY: No dysuria, urinary frequency, hematuria. NEURO: No syncope, presyncope, headache.   Remainder:  ROS NEGATIVE    Past Medical History:      Diagnosis Date    ANS (arteriolar nephrosclerosis)     Anxiety 2018    Arrhythmia 1995    Atrial Fibrillation 1995, Reentrant AV elfego tachycardia, radiofrequency ablation March 1999 [facility] Day  / follow with Dr. Raymundo Skelton every year    Atrial fibrillation [facility])     AVNRT (AV elfego re-entry tachycardia) ([facility] 2008?    s/p ablation /resolved; noted 9/28/18- had recent fast heart rate & hypertensoion & seeing  Minrobbi  9/28/18    Chronic kidney disease 05/2017    Depression     Dry eyes     Dry mouth     Fatigue     GERD (gastroesophageal reflux disease)     Glaucoma     left eyes    Glaucoma     HH (hiatus hernia)     Hyperlipidemia     Hypertension     Hypothyroidism     Menopause     Osteoarthritis     Sjogren's syndrome ([address]     Spinal stenosis     at lower back    Stool incontinence 09/2018    SVT (supraventricular tachycardia) (HCC)     Tinnitus     Tremor      Medications:  Reviewed and reconciled. Allergies:  No Known Allergies    Physical Exam:  BP (!) 186/81   Pulse 70   Temp 97.3 °F (36.3 °C)   Ht 5' 4\" (1.626 m)   Wt 150 lb 12.8 oz (68.4 kg)   SpO2 96%   BMI 25.88 kg/m²   GENERAL: Alert, oriented x 3, not in acute distress. EXTREMITIES: Without clubbing, cyanosis, or edema. NEUROLOGIC: No focal deficits. Impression/Plan:  76 y/o female with hx of hyperlipidemia, hypertension, hypothyroidism, sjogren's syndrome, chronic kidney disease, A. Fib, osteoarthritis, anxiety, depression, who was noted to have iron deficiency anemia on routine blood work  On07/02/2021: Hb 9.0  Hct 31.0  MCV 82.9    WBC 7.0  ALC 1.47  Rest of diff normal  BUN 30  Creat 1.1  Ca 9.3  Albumin 4.2  ESR, CRP WNL  IgA, IgG, IgM WNL  SPEP: Normal. No monoclonal protein identified. Fe 22 ()  FIT negative    Last EGD/Colonoscopy on 10/05/2018 mild gastroduodenitis, small sliding hiatal hernia  A.  Duodenum, biopsy: No pathologic alterations   B.  Stomach, biopsy:  Moderate chronic gastritis; negative for intestinal metaplasia   Immunostain negative for Helicobacter pylori organisms   C.  Stomach, biopsy: Fundic gland polyp with mild chronic inflammation; negative for intestinal metaplasia   Immunostain negative for Helicobacter pylori organisms   D.  Esophagus, biopsy: Superficial fragments of unremarkable squamous epithelium   E.  Terminal ileum, biopsy: No pathologic alterations   F.  Random colon, biopsy: No pathologic alterations    Labs 07/15/2021 reviewed. Borderline microcytic anemia consistent with iron deficiency anemia. F/U with PCP for abnormal TSH  GI team (Dr. Sarah Zarco) on board for EGD/Colonoscopy,capsule endoscopy  FeSO4 325 mg po bid was started on 07/15/2021 with good tolerance so far. Energy level improved. On 08/11/2021: Hb 12.1  Hct 41.1  MCV 84.2     WBC 7.3    BUN 24  Creat 1.2   Fe 231 TIBC 321 FeSat 72%  Ferritin 50  Changed FeSO4 325 mg po daily    EGD/colonoscopy on 9/22/2021 LA B erosive esophagitis with peptic stricture. Biopsies were done. Dilation was done with a 15 to 18 mm through-the-scope CRE balloon in sequential fashion  4-5 centimeters hiatal hernia with no Clement erosions or lesions  Mild gastritis. Biopsied for H. Pylori  Normal-appearing duodenum with biopsies to rule out celiac  Colon polyps x2 removed by forceps  Diverticulosis mild in the sigmoid with no stigmata of bleeding  Small internal and external hemorrhoids    A. Small bowel biopsy: Small intestinal mucosa with an intact villous architecture showing no significant pathologic changes. B.  Stomach biopsy: Antral and oxyntic mucosa with mild chronic gastritis. No Helicobacter pylori-like organisms identified on H&E stain  C.  Esophagus biopsy: Esophageal squamous mucosa with findings suggestive of reflux esophagitis  D. Descending colon polyp polypectomy: Tubular adenoma  E. Sigmoid colon polyp polypectomy: Incipient tubular adenoma  Pathology reviewed. On 09/24/2021: Hb 14.6   Hct 45.9  MCV 87.4    WBC 6.4  BUN 21  Creat 0.8  Fe 56 TIBC 368  FeSat 15% Ferritin 58   Folate >20; VitB12 999  Continued FeSO4 325 mg po daily. On 11/24/2021: Hb 14.5  Hct 45.2  MCV 91.7    WBC 7.2  BUN 22  Creat 0.7  Fe 114 TIBC 328  FeSat 35% Ferritin 120  Continue FeSO4 325 mg po daily.     RTC 2 months with prior labs    Adeline Milan MD   11/24/2021

## 2021-12-20 DIAGNOSIS — E03.9 ACQUIRED HYPOTHYROIDISM: ICD-10-CM

## 2021-12-20 DIAGNOSIS — E11.9 DIET-CONTROLLED DIABETES MELLITUS (HCC): ICD-10-CM

## 2021-12-20 DIAGNOSIS — M81.0 AGE-RELATED OSTEOPOROSIS WITHOUT CURRENT PATHOLOGICAL FRACTURE: ICD-10-CM

## 2021-12-20 DIAGNOSIS — I10 ESSENTIAL HYPERTENSION: Chronic | ICD-10-CM

## 2021-12-20 DIAGNOSIS — D50.8 OTHER IRON DEFICIENCY ANEMIA: ICD-10-CM

## 2021-12-20 LAB
ALBUMIN SERPL-MCNC: 4.5 G/DL (ref 3.5–5.2)
ALP BLD-CCNC: 71 U/L (ref 35–104)
ALT SERPL-CCNC: 13 U/L (ref 0–32)
ANION GAP SERPL CALCULATED.3IONS-SCNC: 15 MMOL/L (ref 7–16)
AST SERPL-CCNC: 23 U/L (ref 0–31)
BASOPHILS ABSOLUTE: 0.07 E9/L (ref 0–0.2)
BASOPHILS RELATIVE PERCENT: 0.9 % (ref 0–2)
BILIRUB SERPL-MCNC: 0.7 MG/DL (ref 0–1.2)
BILIRUBIN URINE: NEGATIVE
BLOOD, URINE: NEGATIVE
BUN BLDV-MCNC: 20 MG/DL (ref 6–23)
CALCIUM SERPL-MCNC: 9.9 MG/DL (ref 8.6–10.2)
CHLORIDE BLD-SCNC: 104 MMOL/L (ref 98–107)
CHOLESTEROL, TOTAL: 169 MG/DL (ref 0–199)
CLARITY: CLEAR
CO2: 24 MMOL/L (ref 22–29)
COLOR: YELLOW
CREAT SERPL-MCNC: 0.9 MG/DL (ref 0.5–1)
EOSINOPHILS ABSOLUTE: 0.43 E9/L (ref 0.05–0.5)
EOSINOPHILS RELATIVE PERCENT: 5.6 % (ref 0–6)
GFR AFRICAN AMERICAN: >60
GFR NON-AFRICAN AMERICAN: >60 ML/MIN/1.73
GLUCOSE BLD-MCNC: 89 MG/DL (ref 74–99)
GLUCOSE URINE: NEGATIVE MG/DL
HBA1C MFR BLD: 5.3 % (ref 4–5.6)
HCT VFR BLD CALC: 44.5 % (ref 34–48)
HDLC SERPL-MCNC: 49 MG/DL
HEMOGLOBIN: 14.3 G/DL (ref 11.5–15.5)
IMMATURE GRANULOCYTES #: 0.03 E9/L
IMMATURE GRANULOCYTES %: 0.4 % (ref 0–5)
IRON: 78 MCG/DL (ref 37–145)
KETONES, URINE: NEGATIVE MG/DL
LDL CHOLESTEROL CALCULATED: 100 MG/DL (ref 0–99)
LEUKOCYTE ESTERASE, URINE: NEGATIVE
LYMPHOCYTES ABSOLUTE: 1.89 E9/L (ref 1.5–4)
LYMPHOCYTES RELATIVE PERCENT: 24.5 % (ref 20–42)
MCH RBC QN AUTO: 31.2 PG (ref 26–35)
MCHC RBC AUTO-ENTMCNC: 32.1 % (ref 32–34.5)
MCV RBC AUTO: 97.2 FL (ref 80–99.9)
MONOCYTES ABSOLUTE: 0.9 E9/L (ref 0.1–0.95)
MONOCYTES RELATIVE PERCENT: 11.7 % (ref 2–12)
NEUTROPHILS ABSOLUTE: 4.4 E9/L (ref 1.8–7.3)
NEUTROPHILS RELATIVE PERCENT: 56.9 % (ref 43–80)
NITRITE, URINE: NEGATIVE
PDW BLD-RTO: 14.2 FL (ref 11.5–15)
PH UA: 5.5 (ref 5–9)
PLATELET # BLD: 188 E9/L (ref 130–450)
PMV BLD AUTO: 10.5 FL (ref 7–12)
POTASSIUM SERPL-SCNC: 4.9 MMOL/L (ref 3.5–5)
PROTEIN UA: NEGATIVE MG/DL
RBC # BLD: 4.58 E12/L (ref 3.5–5.5)
SODIUM BLD-SCNC: 143 MMOL/L (ref 132–146)
SPECIFIC GRAVITY UA: >=1.03 (ref 1–1.03)
T4 TOTAL: 8.8 MCG/DL (ref 4.5–11.7)
TOTAL PROTEIN: 7.2 G/DL (ref 6.4–8.3)
TRIGL SERPL-MCNC: 101 MG/DL (ref 0–149)
TSH SERPL DL<=0.05 MIU/L-ACNC: 0.01 UIU/ML (ref 0.27–4.2)
UROBILINOGEN, URINE: 0.2 E.U./DL
VITAMIN D 25-HYDROXY: >120 NG/ML (ref 30–100)
VLDLC SERPL CALC-MCNC: 20 MG/DL
WBC # BLD: 7.7 E9/L (ref 4.5–11.5)

## 2021-12-22 ENCOUNTER — OFFICE VISIT (OUTPATIENT)
Dept: PRIMARY CARE CLINIC | Age: 76
End: 2021-12-22
Payer: MEDICARE

## 2021-12-22 VITALS
TEMPERATURE: 98.7 F | WEIGHT: 149 LBS | HEIGHT: 64 IN | OXYGEN SATURATION: 97 % | BODY MASS INDEX: 25.44 KG/M2 | SYSTOLIC BLOOD PRESSURE: 127 MMHG | HEART RATE: 54 BPM | DIASTOLIC BLOOD PRESSURE: 78 MMHG

## 2021-12-22 DIAGNOSIS — M35.09 SJOGREN'S SYNDROME WITH OTHER ORGAN INVOLVEMENT (HCC): Primary | Chronic | ICD-10-CM

## 2021-12-22 DIAGNOSIS — E11.9 DIET-CONTROLLED DIABETES MELLITUS (HCC): ICD-10-CM

## 2021-12-22 DIAGNOSIS — D50.8 OTHER IRON DEFICIENCY ANEMIA: ICD-10-CM

## 2021-12-22 DIAGNOSIS — D51.8 VITAMIN B12 DEFICIENCY (DIETARY) ANEMIA: ICD-10-CM

## 2021-12-22 DIAGNOSIS — N18.31 STAGE 3A CHRONIC KIDNEY DISEASE (HCC): ICD-10-CM

## 2021-12-22 DIAGNOSIS — E03.9 ACQUIRED HYPOTHYROIDISM: Chronic | ICD-10-CM

## 2021-12-22 DIAGNOSIS — N39.46 MIXED STRESS AND URGE URINARY INCONTINENCE: ICD-10-CM

## 2021-12-22 DIAGNOSIS — M81.0 AGE-RELATED OSTEOPOROSIS WITHOUT CURRENT PATHOLOGICAL FRACTURE: ICD-10-CM

## 2021-12-22 DIAGNOSIS — R25.1 TREMOR: ICD-10-CM

## 2021-12-22 DIAGNOSIS — I10 ESSENTIAL HYPERTENSION: Chronic | ICD-10-CM

## 2021-12-22 DIAGNOSIS — E55.9 VITAMIN D DEFICIENCY: Chronic | ICD-10-CM

## 2021-12-22 DIAGNOSIS — K21.9 GASTROESOPHAGEAL REFLUX DISEASE WITHOUT ESOPHAGITIS: Chronic | ICD-10-CM

## 2021-12-22 PROCEDURE — 99214 OFFICE O/P EST MOD 30 MIN: CPT | Performed by: FAMILY MEDICINE

## 2021-12-22 RX ORDER — FERROUS SULFATE 325(65) MG
325 TABLET ORAL 3 TIMES DAILY
Qty: 60 TABLET | Refills: 1
Start: 2021-12-22

## 2021-12-22 ASSESSMENT — PATIENT HEALTH QUESTIONNAIRE - PHQ9
SUM OF ALL RESPONSES TO PHQ QUESTIONS 1-9: 0
2. FEELING DOWN, DEPRESSED OR HOPELESS: 0
1. LITTLE INTEREST OR PLEASURE IN DOING THINGS: 0
SUM OF ALL RESPONSES TO PHQ9 QUESTIONS 1 & 2: 0
SUM OF ALL RESPONSES TO PHQ QUESTIONS 1-9: 0
SUM OF ALL RESPONSES TO PHQ QUESTIONS 1-9: 0

## 2021-12-22 ASSESSMENT — ENCOUNTER SYMPTOMS
ALLERGIC/IMMUNOLOGIC NEGATIVE: 1
RESPIRATORY NEGATIVE: 1
EYES NEGATIVE: 1
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
## 2021-12-30 ENCOUNTER — TELEPHONE (OUTPATIENT)
Dept: PRIMARY CARE CLINIC | Age: 76
End: 2021-12-30

## 2021-12-30 NOTE — TELEPHONE ENCOUNTER
Patient calling states that she was with her grandkids on [place] and they have sinus infections. Patient is now complaining of a cough, denies all other symptoms. Would like to know if okay to treat with dextromorphan for her cough and generic mucinex. States she does not feel she needs to come in for appt at this time.

## 2021-12-30 NOTE — TELEPHONE ENCOUNTER
Mucinex DM would be good for her since it is not overdrying. If she is not better soon should be Covid tested. Worsening a lot of Covid in the last 2 to 3 days. .  Tell her [facility] is open tomorrow till noon if she wants to get tested.   If  She walks at this afternoon I will see her

## 2022-01-04 ENCOUNTER — OFFICE VISIT (OUTPATIENT)
Dept: FAMILY MEDICINE CLINIC | Age: 77
End: 2022-01-04
Payer: MEDICARE

## 2022-01-04 VITALS
HEART RATE: 79 BPM | SYSTOLIC BLOOD PRESSURE: 128 MMHG | TEMPERATURE: 96.9 F | BODY MASS INDEX: 24.89 KG/M2 | DIASTOLIC BLOOD PRESSURE: 78 MMHG | WEIGHT: 145 LBS | OXYGEN SATURATION: 96 %

## 2022-01-04 DIAGNOSIS — J20.8 ACUTE BRONCHITIS DUE TO OTHER SPECIFIED ORGANISMS: ICD-10-CM

## 2022-01-04 DIAGNOSIS — R68.83 CHILLS: ICD-10-CM

## 2022-01-04 DIAGNOSIS — R09.89 CHEST CONGESTION: ICD-10-CM

## 2022-01-04 DIAGNOSIS — J20.8 ACUTE BRONCHITIS DUE TO OTHER SPECIFIED ORGANISMS: Primary | ICD-10-CM

## 2022-01-04 LAB
INFLUENZA A ANTIBODY: NORMAL
INFLUENZA B ANTIBODY: NORMAL
Lab: NORMAL
PERFORMING INSTRUMENT: NORMAL
QC PASS/FAIL: NORMAL
SARS-COV-2, POC: NORMAL

## 2022-01-04 PROCEDURE — 99213 OFFICE O/P EST LOW 20 MIN: CPT | Performed by: FAMILY MEDICINE

## 2022-01-04 PROCEDURE — 87426 SARSCOV CORONAVIRUS AG IA: CPT | Performed by: FAMILY MEDICINE

## 2022-01-04 PROCEDURE — 87804 INFLUENZA ASSAY W/OPTIC: CPT | Performed by: FAMILY MEDICINE

## 2022-01-04 RX ORDER — CEFDINIR 300 MG/1
300 CAPSULE ORAL 2 TIMES DAILY
Qty: 20 CAPSULE | Refills: 0 | Status: SHIPPED | OUTPATIENT
Start: 2022-01-04 | End: 2022-01-14

## 2022-01-04 RX ORDER — PREDNISONE 10 MG/1
10 TABLET ORAL
Qty: 15 TABLET | Refills: 0 | Status: SHIPPED | OUTPATIENT
Start: 2022-01-04 | End: 2022-01-09

## 2022-01-04 ASSESSMENT — ENCOUNTER SYMPTOMS
EYES NEGATIVE: 1
GASTROINTESTINAL NEGATIVE: 1
ALLERGIC/IMMUNOLOGIC NEGATIVE: 1
CHEST TIGHTNESS: 0
APNEA: 0
TROUBLE SWALLOWING: 0
WHEEZING: 0
SORE THROAT: 0
RESPIRATORY NEGATIVE: 1
SHORTNESS OF BREATH: 0
STRIDOR: 0
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
## 2022-01-05 ENCOUNTER — TELEPHONE (OUTPATIENT)
Dept: PRIMARY CARE CLINIC | Age: 77
End: 2022-01-05

## 2022-01-05 LAB
SARS-COV-2: NOT DETECTED
SOURCE: NORMAL

## 2022-01-05 NOTE — TELEPHONE ENCOUNTER
Patient states she's coughing so bad she has to sleep in the chair as she's unable to lay down because it's hard to breathe. Asking for something for cough.   Still awaiting PCR results

## 2022-01-06 ENCOUNTER — TELEPHONE (OUTPATIENT)
Dept: PRIMARY CARE CLINIC | Age: 77
End: 2022-01-06

## 2022-01-06 ENCOUNTER — OFFICE VISIT (OUTPATIENT)
Dept: FAMILY MEDICINE CLINIC | Age: 77
End: 2022-01-06
Payer: MEDICARE

## 2022-01-06 VITALS
DIASTOLIC BLOOD PRESSURE: 72 MMHG | SYSTOLIC BLOOD PRESSURE: 130 MMHG | TEMPERATURE: 96.7 F | HEIGHT: 64 IN | HEART RATE: 83 BPM | BODY MASS INDEX: 24.75 KG/M2 | OXYGEN SATURATION: 92 % | WEIGHT: 145 LBS

## 2022-01-06 DIAGNOSIS — J20.8 ACUTE BRONCHITIS DUE TO OTHER SPECIFIED ORGANISMS: Primary | ICD-10-CM

## 2022-01-06 DIAGNOSIS — M35.09 SJOGREN'S SYNDROME WITH OTHER ORGAN INVOLVEMENT (HCC): Chronic | ICD-10-CM

## 2022-01-06 PROCEDURE — 99213 OFFICE O/P EST LOW 20 MIN: CPT | Performed by: FAMILY MEDICINE

## 2022-01-06 RX ORDER — GUAIFENESIN AND CODEINE PHOSPHATE 100; 10 MG/5ML; MG/5ML
5 SOLUTION ORAL 2 TIMES DAILY PRN
Qty: 120 ML | Refills: 0 | Status: SHIPPED
Start: 2022-01-06 | End: 2022-01-12

## 2022-01-06 RX ORDER — PROMETHAZINE HYDROCHLORIDE AND CODEINE PHOSPHATE 6.25; 1 MG/5ML; MG/5ML
5 SYRUP ORAL EVERY 4 HOURS PRN
Qty: 120 ML | Refills: 0 | Status: SHIPPED
Start: 2022-01-06 | End: 2022-01-12

## 2022-01-06 ASSESSMENT — ENCOUNTER SYMPTOMS
GASTROINTESTINAL NEGATIVE: 1
COUGH: 1
EYES NEGATIVE: 1
ALLERGIC/IMMUNOLOGIC NEGATIVE: 1
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
## 2022-01-10 ENCOUNTER — TELEPHONE (OUTPATIENT)
Dept: PRIMARY CARE CLINIC | Age: 77
End: 2022-01-10

## 2022-01-10 RX ORDER — VENLAFAXINE HYDROCHLORIDE 37.5 MG/1
37.5 CAPSULE, EXTENDED RELEASE ORAL DAILY
Qty: 90 CAPSULE | Refills: 3 | Status: CANCELLED | OUTPATIENT
Start: 2022-01-10

## 2022-01-10 RX ORDER — VENLAFAXINE HYDROCHLORIDE 37.5 MG/1
37.5 CAPSULE, EXTENDED RELEASE ORAL DAILY
Qty: 90 CAPSULE | Refills: 3 | Status: SHIPPED
Start: 2022-01-10 | End: 2022-01-11 | Stop reason: SDUPTHER

## 2022-01-10 NOTE — TELEPHONE ENCOUNTER
----- Message from Matthew Scott sent at 1/10/2022  7:51 AM EST -----  Subject: Message to Provider    QUESTIONS  Information for Provider? Was being treated for a cough, the cough is now   gone   ---------------------------------------------------------------------------  --------------  CALL BACK INFO  What is the best way for the office to contact you? OK to leave message on   voicemail  Preferred Call Back Phone Number? [phone]  ---------------------------------------------------------------------------  --------------  SCRIPT ANSWERS  Relationship to Patient?  Self

## 2022-01-11 RX ORDER — VENLAFAXINE HYDROCHLORIDE 37.5 MG/1
37.5 CAPSULE, EXTENDED RELEASE ORAL DAILY
Qty: 90 CAPSULE | Refills: 3 | Status: SHIPPED
Start: 2022-01-11 | End: 2022-02-02

## 2022-01-12 ENCOUNTER — OFFICE VISIT (OUTPATIENT)
Dept: CARDIOLOGY CLINIC | Age: 77
End: 2022-01-12
Payer: MEDICARE

## 2022-01-12 VITALS
HEART RATE: 64 BPM | BODY MASS INDEX: 25.61 KG/M2 | HEIGHT: 64 IN | WEIGHT: 150 LBS | SYSTOLIC BLOOD PRESSURE: 118 MMHG | RESPIRATION RATE: 16 BRPM | DIASTOLIC BLOOD PRESSURE: 64 MMHG

## 2022-01-12 DIAGNOSIS — E78.2 MIXED HYPERLIPIDEMIA: ICD-10-CM

## 2022-01-12 DIAGNOSIS — I10 ESSENTIAL HYPERTENSION: Primary | ICD-10-CM

## 2022-01-12 DIAGNOSIS — I47.1 AVNRT (AV NODAL RE-ENTRY TACHYCARDIA) (HCC): ICD-10-CM

## 2022-01-12 PROCEDURE — 99214 OFFICE O/P EST MOD 30 MIN: CPT | Performed by: INTERNAL MEDICINE

## 2022-01-12 PROCEDURE — 93000 ELECTROCARDIOGRAM COMPLETE: CPT | Performed by: INTERNAL MEDICINE
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
## 2022-01-19 ENCOUNTER — HOSPITAL ENCOUNTER (OUTPATIENT)
Dept: INFUSION THERAPY | Age: 77
End: 2022-01-19

## 2022-01-25 RX ORDER — METOPROLOL TARTRATE 50 MG/1
25 TABLET, FILM COATED ORAL 2 TIMES DAILY
Qty: 90 TABLET | Refills: 3 | Status: SHIPPED | OUTPATIENT
Start: 2022-01-25

## 2022-01-26 ENCOUNTER — TELEPHONE (OUTPATIENT)
Dept: PRIMARY CARE CLINIC | Age: 77
End: 2022-01-26

## 2022-01-26 RX ORDER — BUPROPION HYDROCHLORIDE 150 MG/1
150 TABLET ORAL EVERY MORNING
Qty: 90 TABLET | Refills: 5 | Status: SHIPPED
Start: 2022-01-26 | End: 2022-03-29 | Stop reason: SDUPTHER

## 2022-01-26 NOTE — TELEPHONE ENCOUNTER
Pt currently taking Effexor, she states that it does not seem to be helping. He was advised by her pharmacist to try Wellbutrin. Pt asking if you can send a script in for her. She uses [facility] in [facility]

## 2022-01-31 DIAGNOSIS — D50.9 IRON DEFICIENCY ANEMIA, UNSPECIFIED IRON DEFICIENCY ANEMIA TYPE: ICD-10-CM

## 2022-01-31 LAB
ALBUMIN SERPL-MCNC: 4.3 G/DL (ref 3.5–5.2)
ALP BLD-CCNC: 86 U/L (ref 35–104)
ALT SERPL-CCNC: 25 U/L (ref 0–32)
ANION GAP SERPL CALCULATED.3IONS-SCNC: 14 MMOL/L (ref 7–16)
AST SERPL-CCNC: 35 U/L (ref 0–31)
BASOPHILS ABSOLUTE: 0.07 E9/L (ref 0–0.2)
BASOPHILS RELATIVE PERCENT: 1.1 % (ref 0–2)
BILIRUB SERPL-MCNC: 0.4 MG/DL (ref 0–1.2)
BUN BLDV-MCNC: 18 MG/DL (ref 6–23)
CALCIUM SERPL-MCNC: 10 MG/DL (ref 8.6–10.2)
CHLORIDE BLD-SCNC: 105 MMOL/L (ref 98–107)
CO2: 24 MMOL/L (ref 22–29)
CREAT SERPL-MCNC: 0.8 MG/DL (ref 0.5–1)
EOSINOPHILS ABSOLUTE: 0.59 E9/L (ref 0.05–0.5)
EOSINOPHILS RELATIVE PERCENT: 9.2 % (ref 0–6)
FERRITIN: 199 NG/ML
GFR AFRICAN AMERICAN: >60
GFR NON-AFRICAN AMERICAN: >60 ML/MIN/1.73
GLUCOSE BLD-MCNC: 100 MG/DL (ref 74–99)
HCT VFR BLD CALC: 42.2 % (ref 34–48)
HEMOGLOBIN: 13.6 G/DL (ref 11.5–15.5)
IMMATURE GRANULOCYTES #: 0.02 E9/L
IMMATURE GRANULOCYTES %: 0.3 % (ref 0–5)
IRON SATURATION: 32 % (ref 15–50)
IRON: 98 MCG/DL (ref 37–145)
LYMPHOCYTES ABSOLUTE: 1.37 E9/L (ref 1.5–4)
LYMPHOCYTES RELATIVE PERCENT: 21.4 % (ref 20–42)
MCH RBC QN AUTO: 31.3 PG (ref 26–35)
MCHC RBC AUTO-ENTMCNC: 32.2 % (ref 32–34.5)
MCV RBC AUTO: 97 FL (ref 80–99.9)
MONOCYTES ABSOLUTE: 0.81 E9/L (ref 0.1–0.95)
MONOCYTES RELATIVE PERCENT: 12.7 % (ref 2–12)
NEUTROPHILS ABSOLUTE: 3.53 E9/L (ref 1.8–7.3)
NEUTROPHILS RELATIVE PERCENT: 55.3 % (ref 43–80)
PDW BLD-RTO: 14.2 FL (ref 11.5–15)
PLATELET # BLD: 237 E9/L (ref 130–450)
PMV BLD AUTO: 10.2 FL (ref 7–12)
POTASSIUM SERPL-SCNC: 5.1 MMOL/L (ref 3.5–5)
RBC # BLD: 4.35 E12/L (ref 3.5–5.5)
SODIUM BLD-SCNC: 143 MMOL/L (ref 132–146)
TOTAL IRON BINDING CAPACITY: 303 MCG/DL (ref 250–450)
TOTAL PROTEIN: 7.1 G/DL (ref 6.4–8.3)
WBC # BLD: 6.4 E9/L (ref 4.5–11.5)

## 2022-02-02 ENCOUNTER — HOSPITAL ENCOUNTER (OUTPATIENT)
Dept: INFUSION THERAPY | Age: 77
Discharge: HOME | End: 2022-02-02

## 2022-02-02 ENCOUNTER — OFFICE VISIT (OUTPATIENT)
Dept: ONCOLOGY | Age: 77
End: 2022-02-02
Payer: MEDICARE

## 2022-02-02 VITALS
BODY MASS INDEX: 24.92 KG/M2 | RESPIRATION RATE: 18 BRPM | HEART RATE: 65 BPM | WEIGHT: 146 LBS | DIASTOLIC BLOOD PRESSURE: 79 MMHG | HEIGHT: 64 IN | SYSTOLIC BLOOD PRESSURE: 134 MMHG | OXYGEN SATURATION: 97 % | TEMPERATURE: 97.3 F

## 2022-02-02 DIAGNOSIS — D50.9 IRON DEFICIENCY ANEMIA, UNSPECIFIED IRON DEFICIENCY ANEMIA TYPE: Primary | ICD-10-CM

## 2022-02-02 PROCEDURE — 99212 OFFICE O/P EST SF 10 MIN: CPT

## 2022-02-02 PROCEDURE — 99214 OFFICE O/P EST MOD 30 MIN: CPT | Performed by: INTERNAL MEDICINE

## 2022-02-02 NOTE — PROGRESS NOTES
Department of [facility] Oncology  Attending Clinic Note    Reason for Visit: Follow-up on a patient with Iron deficiency anemia    PCP:  Murphy Rain DO    History of Present Illness:  78 y/o female with hx of hyperlipidemia, hypertension, hypothyroidism, sjogren's syndrome, chronic kidney disease, A. Fib, osteoarthritis, anxiety, depression, who was noted to have iron deficiency anemia on routine blood work  On 07/02/2021: Hb 9.0  Hct 31.0  MCV 82.9    WBC 7.0  ALC 1.47  Rest of diff normal  BUN 30  Creat 1.1  Ca 9.3  Albumin 4.2  ESR, CRP WNL  IgA, IgG, IgM WNL  SPEP: Normal. No monoclonal protein identified. Fe 22 ()  FIT negative    Last EGD/Colonoscopy on 10/05/2018 mild gastroduodenitis, small sliding hiatal hernia  A.  Duodenum, biopsy: No pathologic alterations   B.  Stomach, biopsy: Moderate chronic gastritis; negative for intestinal metaplasia   Immunostain negative for Helicobacter pylori organisms   C.  Stomach, biopsy: Fundic gland polyp with mild chronic inflammation; negative for intestinal metaplasia   Immunostain negative for Helicobacter pylori organisms   D.  Esophagus, biopsy: Superficial fragments of unremarkable squamous epithelium   E.  Terminal ileum, biopsy: No pathologic alterations   F.  Random colon, biopsy:  No pathologic alterations    Labs 07/15/2021 reviewed. Borderline microcytic anemia consistent with iron deficiency anemia. F/U with PCP for abnormal TSH  GI team (Dr. Jorge Vang) on board for EGD/Colonoscopy,capsule endoscopy  FeSO4 325 mg po bid was started on 07/15/2021 with good tolerance so far. Energy level improved. On 08/11/2021: Hb 12.1  Hct 41.1  MCV 84.2     WBC 7.3    BUN 24  Creat 1.2   Fe 231 TIBC 321 FeSat 72%  Ferritin 50  Changed FeSO4 325 mg po daily    EGD/colonoscopy on 9/22/2021 LA B erosive esophagitis with peptic stricture. Biopsies were done.   Dilation was done with a 15 to 18 mm through-the-scope CRE balloon in sequential fashion  4-5 centimeters hiatal hernia with no Clement erosions or lesions  Mild gastritis. Biopsied for H. Pylori  Normal-appearing duodenum with biopsies to rule out celiac  Colon polyps x2 removed by forceps  Diverticulosis mild in the sigmoid with no stigmata of bleeding  Small internal and external hemorrhoids    A. Small bowel biopsy: Small intestinal mucosa with an intact villous architecture showing no significant pathologic changes. B.  Stomach biopsy: Antral and oxyntic mucosa with mild chronic gastritis. No Helicobacter pylori-like organisms identified on H&E stain  C.  Esophagus biopsy: Esophageal squamous mucosa with findings suggestive of reflux esophagitis  D. Descending colon polyp polypectomy: Tubular adenoma  E. Sigmoid colon polyp polypectomy: Incipient tubular adenoma    On 09/24/2021: Hb 14.6   Hct 45.9  MCV 87.4    WBC 6.4  BUN 21  Creat 0.8  Fe 56 TIBC 368  FeSat 15% Ferritin 58   Folate >20; VitB12 999  On FeSO4 325 mg po TID. On 11/24/2021: Hb 14.5  Hct 45.2  MCV 91.7    WBC 7.2  BUN 22  Creat 0.7  Fe 114 TIBC 328  FeSat 35% Ferritin 120  Continued FeSO4 325 mg po TID. Today 02/02/2022; No fever, chills. Fair appetite and energy level    Review of Systems;  CONSTITUTIONAL: No fever, chills. Fair appetite and energy level. ENMT: Eyes: No diplopia; Nose: No epistaxis. Mouth: No sore throat. RESPIRATORY: No hemoptysis, shortness of breath, cough. CARDIOVASCULAR: No chest pain, palpitations. GASTROINTESTINAL: No nausea/vomiting, abdominal pain  GENITOURINARY: No dysuria, urinary frequency, hematuria. NEURO: No syncope, presyncope, headache.   Remainder:  ROS NEGATIVE    Past Medical History:      Diagnosis Date    ANS (arteriolar nephrosclerosis)     Anxiety 2018    Arrhythmia 1995    Atrial Fibrillation 1995, Reentrant AV elfego tachycardia, radiofrequency ablation March 1999 Saman Cope  / follow with Dr. Chandni Govea every year    Atrial fibrillation [facility])     AVNRT (AV elfego re-entry tachycardia) ([facility] [address] 2008?    s/p ablation /resolved; noted 9/28/18- had recent fast heart rate & hypertensoion & seeing Dr. Cruz Sat  9/28/18    Chronic kidney disease 05/2017    Depression     Dry eyes     Dry mouth     Fatigue     GERD (gastroesophageal reflux disease)     Glaucoma     left eyes    Glaucoma     HH (hiatus hernia)     Hyperlipidemia     Hypertension     Hypothyroidism     Menopause     Osteoarthritis     Sjogren's syndrome ([facility] [address]     Spinal stenosis     at lower back    Stool incontinence 09/2018    SV[facility]) [facility]     Tinnitus     Tremor      Medications:  Reviewed and reconciled. Allergies:  No Known Allergies    Physical Exam:  /79 (Site: Left Upper Arm, Position: Sitting)   Pulse 65   Temp 97.3 °F (36.3 °C) (Infrared)   Resp 18   Ht 5' 4\" (1.626 m)   Wt 146 lb (66.2 kg)   SpO2 97%   BMI 25.06 kg/m²   GENERAL: Alert, oriented x 3, not in acute distress. EXTREMITIES: Without clubbing, cyanosis, or edema. NEUROLOGIC: No focal deficits.      Lab Results   Component Value Date    WBC 6.4 01/31/2022    HGB 13.6 01/31/2022    HCT 42.2 01/31/2022    MCV 97.0 01/31/2022     01/31/2022     Lab Results   Component Value Date     01/31/2022    K 5.1 (H) 01/31/2022     01/31/2022    CO2 24 01/31/2022    BUN 18 01/31/2022    CREATININE 0.8 01/31/2022    GLUCOSE 100 (H) 01/31/2022    CALCIUM 10.0 01/31/2022    PROT 7.1 01/31/2022    LABALBU 4.3 01/31/2022    BILITOT 0.4 01/31/2022    ALKPHOS 86 01/31/2022    AST 35 (H) 01/31/2022    ALT 25 01/31/2022    LABGLOM >60 01/31/2022    GFRAA >60 01/31/2022     Lab Results   Component Value Date    IRON 98 01/31/2022    TIBC 303 01/31/2022    FERRITIN 199 01/31/2022     Impression/Plan:  76 y/o female with hx of hyperlipidemia, hypertension, hypothyroidism, sjogren's syndrome, chronic kidney disease, A. Fib, osteoarthritis, anxiety, depression, who was noted to have iron deficiency anemia on routine blood work  On07/02/2021: Hb 9.0  Hct 31.0  MCV 82.9    WBC 7.0  ALC 1.47  Rest of diff normal  BUN 30  Creat 1.1  Ca 9.3  Albumin 4.2  ESR, CRP WNL  IgA, IgG, IgM WNL  SPEP: Normal. No monoclonal protein identified. Fe 22 ()  FIT negative    Last EGD/Colonoscopy on 10/05/2018 mild gastroduodenitis, small sliding hiatal hernia  A.  Duodenum, biopsy: No pathologic alterations   B.  Stomach, biopsy: Moderate chronic gastritis; negative for intestinal metaplasia   Immunostain negative for Helicobacter pylori organisms   C.  Stomach, biopsy: Fundic gland polyp with mild chronic inflammation; negative for intestinal metaplasia   Immunostain negative for Helicobacter pylori organisms   D.  Esophagus, biopsy: Superficial fragments of unremarkable squamous epithelium   E.  Terminal ileum, biopsy: No pathologic alterations   F.  Random colon, biopsy: No pathologic alterations    Labs 07/15/2021 reviewed. Borderline microcytic anemia consistent with iron deficiency anemia. F/U with PCP for abnormal TSH  GI team (Dr. Gabi Qureshi) on board for EGD/Colonoscopy,capsule endoscopy  FeSO4 325 mg po bid was started on 07/15/2021 with good tolerance so far. Energy level improved. On 08/11/2021: Hb 12.1  Hct 41.1  MCV 84.2     WBC 7.3    BUN 24  Creat 1.2   Fe 231 TIBC 321 FeSat 72%  Ferritin 50  Changed FeSO4 325 mg po daily    EGD/colonoscopy on 9/22/2021 LA B erosive esophagitis with peptic stricture. Biopsies were done. Dilation was done with a 15 to 18 mm through-the-scope CRE balloon in sequential fashion  4-5 centimeters hiatal hernia with no Clement erosions or lesions  Mild gastritis.   Biopsied for H. Pylori  Normal-appearing duodenum with biopsies to rule out celiac  Colon polyps x2 removed by forceps  Diverticulosis mild in the sigmoid with no stigmata of bleeding  Small internal and external hemorrhoids    A. Small bowel biopsy: Small intestinal mucosa with an intact villous architecture showing no significant pathologic changes. B.  Stomach biopsy: Antral and oxyntic mucosa with mild chronic gastritis. No Helicobacter pylori-like organisms identified on H&E stain  C.  Esophagus biopsy: Esophageal squamous mucosa with findings suggestive of reflux esophagitis  D. Descending colon polyp polypectomy: Tubular adenoma  E. Sigmoid colon polyp polypectomy: Incipient tubular adenoma  Pathology reviewed. On 09/24/2021: Hb 14.6   Hct 45.9  MCV 87.4    WBC 6.4  BUN 21  Creat 0.8  Fe 56 TIBC 368  FeSat 15% Ferritin 58   Folate >20; VitB12 999  On FeSO4 325 mg po TID. On 11/24/2021: Hb 14.5  Hct 45.2  MCV 91.7    WBC 7.2  BUN 22  Creat 0.7  Fe 114 TIBC 328  FeSat 35% Ferritin 120  Continued FeSO4 325 mg po TID    On 01/31/2022: Hb 13.6  Hct 42.2  MCV 97.0    WBC 6.4  BUN 18  Creat 0.8  Fe 98 TIBC 303  FeSat 32% Ferritin 199  Change FeSO4 325 mg po to once daily.     RTC 3 months with prior labs    Eddie Gonsalez MD   2/2/2022

## 2022-02-09 ENCOUNTER — HOSPITAL ENCOUNTER (OUTPATIENT)
Dept: ULTRASOUND IMAGING | Age: 77
Discharge: HOME | End: 2022-02-11
Payer: MEDICARE

## 2022-02-09 DIAGNOSIS — N18.31 STAGE 3A CHRONIC KIDNEY DISEASE (HCC): ICD-10-CM

## 2022-02-09 DIAGNOSIS — N39.46 MIXED STRESS AND URGE URINARY INCONTINENCE: ICD-10-CM

## 2022-02-09 PROCEDURE — 76775 US EXAM ABDO BACK WALL LIM: CPT

## 2022-02-09 PROCEDURE — 76770 US EXAM ABDO BACK WALL COMP: CPT

## 2022-02-10 ENCOUNTER — TELEPHONE (OUTPATIENT)
Dept: PRIMARY CARE CLINIC | Age: 77
End: 2022-02-10

## 2022-03-29 ENCOUNTER — TELEPHONE (OUTPATIENT)
Dept: PRIMARY CARE CLINIC | Age: 77
End: 2022-03-29

## 2022-03-29 RX ORDER — BUPROPION HYDROCHLORIDE 300 MG/1
300 TABLET ORAL EVERY MORNING
Qty: 90 TABLET | Refills: 5 | Status: SHIPPED
Start: 2022-03-29 | End: 2022-08-04

## 2022-03-29 NOTE — TELEPHONE ENCOUNTER
Pt quit effexor and increased her wellbutrin to 300mg qd.   Asking for Rx for 300mg qd to [facility]

## 2022-04-06 ENCOUNTER — OFFICE VISIT (OUTPATIENT)
Dept: NEUROLOGY | Age: 77
End: 2022-04-06
Payer: MEDICARE

## 2022-04-06 VITALS
WEIGHT: 149 LBS | BODY MASS INDEX: 27.42 KG/M2 | SYSTOLIC BLOOD PRESSURE: 161 MMHG | DIASTOLIC BLOOD PRESSURE: 88 MMHG | HEIGHT: 62 IN | OXYGEN SATURATION: 97 % | HEART RATE: 63 BPM

## 2022-04-06 DIAGNOSIS — G25.0 BENIGN HEAD TREMOR: Chronic | ICD-10-CM

## 2022-04-06 DIAGNOSIS — G25.0 BENIGN ESSENTIAL TREMOR: Primary | ICD-10-CM

## 2022-04-06 PROCEDURE — 99203 OFFICE O/P NEW LOW 30 MIN: CPT | Performed by: PSYCHIATRY & NEUROLOGY

## 2022-04-06 ASSESSMENT — ENCOUNTER SYMPTOMS
ALLERGIC/IMMUNOLOGIC NEGATIVE: 1
GASTROINTESTINAL NEGATIVE: 1
BACK PAIN: 1
RESPIRATORY NEGATIVE: 1
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
## 2022-04-18 DIAGNOSIS — M81.0 AGE-RELATED OSTEOPOROSIS WITHOUT CURRENT PATHOLOGICAL FRACTURE: ICD-10-CM

## 2022-04-18 DIAGNOSIS — E11.9 DIET-CONTROLLED DIABETES MELLITUS (HCC): ICD-10-CM

## 2022-04-18 DIAGNOSIS — M35.09 SJOGREN'S SYNDROME WITH OTHER ORGAN INVOLVEMENT (HCC): Chronic | ICD-10-CM

## 2022-04-18 DIAGNOSIS — D50.8 OTHER IRON DEFICIENCY ANEMIA: ICD-10-CM

## 2022-04-18 DIAGNOSIS — D51.8 VITAMIN B12 DEFICIENCY (DIETARY) ANEMIA: ICD-10-CM

## 2022-04-18 DIAGNOSIS — I10 ESSENTIAL HYPERTENSION: Chronic | ICD-10-CM

## 2022-04-18 DIAGNOSIS — E03.9 ACQUIRED HYPOTHYROIDISM: Chronic | ICD-10-CM

## 2022-04-18 DIAGNOSIS — N18.31 STAGE 3A CHRONIC KIDNEY DISEASE (HCC): ICD-10-CM

## 2022-04-18 LAB
ALBUMIN SERPL-MCNC: 4.2 G/DL (ref 3.5–5.2)
ALP BLD-CCNC: 71 U/L (ref 35–104)
ALT SERPL-CCNC: 18 U/L (ref 0–32)
ANION GAP SERPL CALCULATED.3IONS-SCNC: 14 MMOL/L (ref 7–16)
AST SERPL-CCNC: 25 U/L (ref 0–31)
BASOPHILS ABSOLUTE: 0.06 E9/L (ref 0–0.2)
BASOPHILS RELATIVE PERCENT: 0.8 % (ref 0–2)
BILIRUB SERPL-MCNC: 0.4 MG/DL (ref 0–1.2)
BILIRUBIN URINE: NEGATIVE
BLOOD, URINE: NEGATIVE
BUN BLDV-MCNC: 20 MG/DL (ref 6–23)
CALCIUM SERPL-MCNC: 9.4 MG/DL (ref 8.6–10.2)
CHLORIDE BLD-SCNC: 107 MMOL/L (ref 98–107)
CHOLESTEROL, TOTAL: 154 MG/DL (ref 0–199)
CLARITY: CLEAR
CO2: 22 MMOL/L (ref 22–29)
COLOR: YELLOW
CREAT SERPL-MCNC: 0.9 MG/DL (ref 0.5–1)
EOSINOPHILS ABSOLUTE: 0.51 E9/L (ref 0.05–0.5)
EOSINOPHILS RELATIVE PERCENT: 6.4 % (ref 0–6)
GFR AFRICAN AMERICAN: >60
GFR NON-AFRICAN AMERICAN: >60 ML/MIN/1.73
GLUCOSE BLD-MCNC: 82 MG/DL (ref 74–99)
GLUCOSE URINE: NEGATIVE MG/DL
HBA1C MFR BLD: 5.1 % (ref 4–5.6)
HCT VFR BLD CALC: 42.6 % (ref 34–48)
HDLC SERPL-MCNC: 41 MG/DL
HEMOGLOBIN: 13.9 G/DL (ref 11.5–15.5)
IMMATURE GRANULOCYTES #: 0.02 E9/L
IMMATURE GRANULOCYTES %: 0.3 % (ref 0–5)
IRON: 135 MCG/DL (ref 37–145)
KETONES, URINE: ABNORMAL MG/DL
LDL CHOLESTEROL CALCULATED: 65 MG/DL (ref 0–99)
LEUKOCYTE ESTERASE, URINE: NEGATIVE
LYMPHOCYTES ABSOLUTE: 1.55 E9/L (ref 1.5–4)
LYMPHOCYTES RELATIVE PERCENT: 19.5 % (ref 20–42)
MCH RBC QN AUTO: 31.4 PG (ref 26–35)
MCHC RBC AUTO-ENTMCNC: 32.6 % (ref 32–34.5)
MCV RBC AUTO: 96.4 FL (ref 80–99.9)
MONOCYTES ABSOLUTE: 0.74 E9/L (ref 0.1–0.95)
MONOCYTES RELATIVE PERCENT: 9.3 % (ref 2–12)
NEUTROPHILS ABSOLUTE: 5.08 E9/L (ref 1.8–7.3)
NEUTROPHILS RELATIVE PERCENT: 63.7 % (ref 43–80)
NITRITE, URINE: NEGATIVE
PDW BLD-RTO: 13.1 FL (ref 11.5–15)
PH UA: 5.5 (ref 5–9)
PLATELET # BLD: 183 E9/L (ref 130–450)
PMV BLD AUTO: 10.7 FL (ref 7–12)
POTASSIUM SERPL-SCNC: 4.5 MMOL/L (ref 3.5–5)
PROTEIN UA: NEGATIVE MG/DL
RBC # BLD: 4.42 E12/L (ref 3.5–5.5)
SODIUM BLD-SCNC: 143 MMOL/L (ref 132–146)
SPECIFIC GRAVITY UA: >=1.03 (ref 1–1.03)
T4 TOTAL: 7.9 MCG/DL (ref 4.5–11.7)
TOTAL PROTEIN: 6.6 G/DL (ref 6.4–8.3)
TRIGL SERPL-MCNC: 240 MG/DL (ref 0–149)
TSH SERPL DL<=0.05 MIU/L-ACNC: 0.02 UIU/ML (ref 0.27–4.2)
UROBILINOGEN, URINE: 0.2 E.U./DL
VITAMIN B-12: 468 PG/ML (ref 211–946)
VLDLC SERPL CALC-MCNC: 48 MG/DL
WBC # BLD: 8 E9/L (ref 4.5–11.5)

## 2022-04-19 LAB — VITAMIN D 25-HYDROXY: 70 NG/ML (ref 30–100)

## 2022-04-20 ENCOUNTER — OFFICE VISIT (OUTPATIENT)
Dept: PRIMARY CARE CLINIC | Age: 77
End: 2022-04-20
Payer: MEDICARE

## 2022-04-20 VITALS
SYSTOLIC BLOOD PRESSURE: 138 MMHG | WEIGHT: 152 LBS | TEMPERATURE: 95.3 F | DIASTOLIC BLOOD PRESSURE: 83 MMHG | HEIGHT: 62 IN | BODY MASS INDEX: 27.97 KG/M2

## 2022-04-20 DIAGNOSIS — E11.9 DIET-CONTROLLED DIABETES MELLITUS (HCC): ICD-10-CM

## 2022-04-20 DIAGNOSIS — M81.0 AGE-RELATED OSTEOPOROSIS WITHOUT CURRENT PATHOLOGICAL FRACTURE: ICD-10-CM

## 2022-04-20 DIAGNOSIS — M35.01 SJOGREN'S SYNDROME WITH KERATOCONJUNCTIVITIS SICCA (HCC): ICD-10-CM

## 2022-04-20 DIAGNOSIS — E03.9 ACQUIRED HYPOTHYROIDISM: ICD-10-CM

## 2022-04-20 DIAGNOSIS — D51.8 VITAMIN B12 DEFICIENCY (DIETARY) ANEMIA: ICD-10-CM

## 2022-04-20 DIAGNOSIS — Z00.00 MEDICARE ANNUAL WELLNESS VISIT, SUBSEQUENT: Primary | ICD-10-CM

## 2022-04-20 PROCEDURE — 3044F HG A1C LEVEL LT 7.0%: CPT | Performed by: FAMILY MEDICINE

## 2022-04-20 PROCEDURE — G0439 PPPS, SUBSEQ VISIT: HCPCS | Performed by: FAMILY MEDICINE

## 2022-04-20 RX ORDER — CEVIMELINE HYDROCHLORIDE 30 MG/1
30 CAPSULE ORAL 4 TIMES DAILY
Qty: 360 CAPSULE | Refills: 12
Start: 2022-04-20 | End: 2022-08-04

## 2022-04-20 ASSESSMENT — LIFESTYLE VARIABLES: HOW OFTEN DO YOU HAVE A DRINK CONTAINING ALCOHOL: NEVER

## 2022-04-20 ASSESSMENT — PATIENT HEALTH QUESTIONNAIRE - PHQ9
SUM OF ALL RESPONSES TO PHQ QUESTIONS 1-9: 0
2. FEELING DOWN, DEPRESSED OR HOPELESS: 0
1. LITTLE INTEREST OR PLEASURE IN DOING THINGS: 0
SUM OF ALL RESPONSES TO PHQ QUESTIONS 1-9: 0
SUM OF ALL RESPONSES TO PHQ9 QUESTIONS 1 & 2: 0

## 2022-04-20 NOTE — PROGRESS NOTES
Medicare Annual Wellness Visit    Renita Chavez is here for Medicare AWV and Discuss Labs    Assessment & Plan   Medicare annual wellness visit, subsequent  Sjogren's syndrome with keratoconjunctivitis sicca ([facility]  -     cevimeline (EVOXAC) 30 MG capsule; Take 1 capsule by mouth 4 times daily, Disp-360 capsule, R-12NO PRINT  -     CBC with Auto Differential; Future  -     Comprehensive Metabolic Panel; Future  -     Hemoglobin A1C; Future  -     Lipid Panel; Future  -     T4; Future  -     TSH; Future  -     Urinalysis; Future  -     Vitamin D 25 Hydroxy; Future  -     Vitamin B12; Future  Vitamin B12 deficiency (dietary) anemia  -     Vitamin B12; Future  Age-related osteoporosis without current pathological fracture  -     Vitamin D 25 Hydroxy; Future  Acquired hypothyroidism  -     T4; Future  -     TSH; Future  Diet-controlled diabetes mellitus ([facility]  -     CBC with Auto Differential; Future  -     Comprehensive Metabolic Panel; Future  -     Hemoglobin A1C; Future  -     Lipid Panel; Future      Recommendations for Preventive Services Due: see orders and patient instructions/AVS.  Recommended screening schedule for the next 5-10 years is provided to the patient in written form: see Patient Instructions/AVS.     Return in 6 months (on 10/20/2022) for Lab Before. Subjective   The following acute and/or chronic problems were also addressed today:   and   4 mo ck       arthrtis   thryoid   Iron def anemia    sjorgen     arth    trmoeor         Feel ok   No cp o  Or ob   No chg sx la bnoted          Lab noted    balcne off  For yrs    slworse          Patient's complete Health Risk Assessment and screening values have been reviewed and are found in Flowsheets. The following problems were reviewed today and where indicated follow up appointments were made and/or referrals ordered.     Positive Risk Factor Screenings with Interventions:              Health Habits/Nutrition:     Physical Activity: Inactive    Days of Exercise per Week: 0 days    Minutes of Exercise per Session: 0 min     Have you lost any weight without trying in the past 3 months?: No  Body mass index: (!) 27.8  Have you seen the dentist within the past year?: Yes    Health Habits/Nutrition Interventions:  · Inadequate physical activity:  patient agrees to exercise for at least 150 minutes/week             Objective   Vitals:    04/20/22 0943   BP: 138/83   Temp: 95.3 °F (35.2 °C)   TempSrc: Infrared   Weight: 152 lb (68.9 kg)   Height: 5' 2\" (1.575 m)      Body mass index is 27.8 kg/m². General Appearance: alert and oriented to person, place and time, well developed and well- nourished, in no acute distress  Skin: warm and dry, no rash or erythema  Head: normocephalic and atraumatic  Eyes: pupils equal, round, and reactive to light, extraocular eye movements intact, conjunctivae normal  ENT: tympanic membrane, external ear and ear canal normal bilaterally, nose without deformity, nasal mucosa and turbinates normal without polyps  Neck: supple and non-tender without mass, no thyromegaly or thyroid nodules, no cervical lymphadenopathy  Pulmonary/Chest: clear to auscultation bilaterally- no wheezes, rales or rhonchi, normal air movement, no respiratory distress  Cardiovascular: normal rate, regular rhythm, normal S1 and S2, no murmurs, rubs, clicks, or gallops, distal pulses intact, no carotid bruits  Abdomen: soft, non-tender, non-distended, normal bowel sounds, no masses or organomegaly  Extremities: no cyanosis, clubbing or edema  Musculoskeletal: normal range of motion, no joint swelling, deformity or tenderness  Neurologic: reflexes normal and symmetric, no cranial nerve deficit, gait, coordination and speech normal       No Known Allergies  Prior to Visit Medications    Medication Sig Taking?  Authorizing Provider   cevimeline (EVOXAC) 30 MG capsule Take 1 capsule by mouth 4 times daily Yes Олег Rain, DO   buPROPion (WELLBUTRIN XL) 300 MG extended release tablet Take 1 tablet by mouth every morning  Олег Rain DO   metoprolol tartrate (LOPRESSOR) 50 MG tablet Take 0.5 tablets by mouth 2 times daily  Олег Rain DO   ferrous sulfate (IRON 325) 325 (65 Fe) MG tablet Take 1 tablet by mouth 3 times daily  Олег Rain DO   fluticasone (FLONASE) 50 MCG/ACT nasal spray 2 sprays by Nasal route every morning (before breakfast)  Олег Rain DO   Multiple Vitamins-Minerals (PRESERVISION AREDS 2 PO) Take by mouth 2 times daily   Historical Provider, MD   levothyroxine (SYNTHROID) 112 MCG tablet Take 1 tablet by mouth Daily  Олег Rain DO   gabapentin (NEURONTIN) 100 MG capsule Take 2 capsules by mouth 3 times daily for 360 days. Олег Rain DO   Alpha-D-Galactosidase (BEANO PO) Take by mouth as needed   Historical Provider, MD   Acetaminophen (TYLENOL ARTHRITIS PAIN PO) Take 650 mg by mouth 3 times daily 2 tabs  Historical Provider, MD   Biotin (BIOTIN 5000) 5 MG CAPS Take by mouth daily LD 9/28/18  Historical Provider, MD   hydroxychloroquine (PLAQUENIL) 200 MG tablet   Take 200 mg by mouth daily   Historical Provider, MD   latanoprost (XALATAN) 0.005 % ophthalmic solution Place 1 drop into both eyes nightly   Historical Provider, MD Hassan (Including outside providers/suppliers regularly involved in providing care):   Patient Care Team:  Semaj Lacy DO as PCP - General (Family Medicine)  [facility] terri as [facility]CP - [facility] Empaneled Provider  Gracie Baer DO as Consulting Physician (Cardiology)    Reviewed and updated this visit:  Tobacco  Allergies  Med Hx  Surg Hx  Soc Hx  Fam Hx            diet exer  Cont same  Fu with rheum      I educated the patient about all medications. Make sure they were correct and educated  on the risk associated with missing meds or taking them incorrectly. A list of medications is being sent home with patient today. Check blood pressure at home twice a day. Low-salt low caffeine diet. Call if systolic blood pressure is above 257 and diastolic blood pressures above 85. Only use a upper arm digital cuff. Aggressive low-fat diet. Avoid red meats, greasy fried foods, dairy products. Avoid processed foods. Take cholesterol medications without food. I informed patient about the risk associated with noncompliance of medication and taking medications incorrectly. Appropriate follow-up with myself and all specialist.  Encourage family members to take active role in assisting with medications and medical care. If any confusion should develop to notify my office immediately to avoid risk of worsening medical condition    A great deal of time spent reviewing medications, diet, exercise, social issues. Also reviewing the chart before entering the room with patient and finishing charting after leaving patient's room. More than half of that time was spent face to face with the patient in counseling and coordinating care.

## 2022-04-20 NOTE — PATIENT INSTRUCTIONS
Personalized Preventive Plan for Latasha Feng - 4/20/2022  Medicare offers a range of preventive health benefits. Some of the tests and screenings are paid in full while other may be subject to a deductible, co-insurance, and/or copay. Some of these benefits include a comprehensive review of your medical history including lifestyle, illnesses that may run in your family, and various assessments and screenings as appropriate. After reviewing your medical record and screening and assessments performed today your provider may have ordered immunizations, labs, imaging, and/or referrals for you. A list of these orders (if applicable) as well as your Preventive Care list are included within your After Visit Summary for your review. Other Preventive Recommendations:    · A preventive eye exam performed by an eye specialist is recommended every 1-2 years to screen for glaucoma; cataracts, macular degeneration, and other eye disorders. · A preventive dental visit is recommended every 6 months. · Try to get at least 150 minutes of exercise per week or 10,000 steps per day on a pedometer . · Order or download the FREE \"Exercise & Physical Activity: Your Everyday Guide\" from [employer]. Call 6-568.646.2681 or search [employer] online. · You need 4280-7010 mg of calcium and 3285-7348 IU of vitamin D per day. It is possible to meet your calcium requirement with diet alone, but a vitamin D supplement is usually necessary to meet this goal.  · When exposed to the sun, use a sunscreen that protects against both UVA and UVB radiation with an SPF of 30 or greater. Reapply every 2 to 3 hours or after sweating, drying off with a towel, or swimming. · Always wear a seat belt when traveling in a car. Always wear a helmet when riding a bicycle or motorcycle.

## 2022-05-04 ENCOUNTER — HOSPITAL ENCOUNTER (OUTPATIENT)
Dept: INFUSION THERAPY | Age: 77
End: 2022-05-04

## 2022-05-04 RX ORDER — GABAPENTIN 100 MG/1
200 CAPSULE ORAL 3 TIMES DAILY
Qty: 540 CAPSULE | Refills: 3 | Status: SHIPPED | OUTPATIENT
Start: 2022-05-04 | End: 2023-04-29

## 2022-08-04 ENCOUNTER — OFFICE VISIT (OUTPATIENT)
Dept: FAMILY MEDICINE CLINIC | Age: 77
End: 2022-08-04
Payer: MEDICARE

## 2022-08-04 VITALS
BODY MASS INDEX: 26.7 KG/M2 | DIASTOLIC BLOOD PRESSURE: 62 MMHG | HEART RATE: 55 BPM | OXYGEN SATURATION: 97 % | WEIGHT: 146 LBS | SYSTOLIC BLOOD PRESSURE: 106 MMHG | TEMPERATURE: 97 F

## 2022-08-04 DIAGNOSIS — M54.50 ACUTE LEFT-SIDED LOW BACK PAIN WITHOUT SCIATICA: Primary | ICD-10-CM

## 2022-08-04 PROCEDURE — 99203 OFFICE O/P NEW LOW 30 MIN: CPT | Performed by: PHYSICIAN ASSISTANT

## 2022-08-04 PROCEDURE — 96372 THER/PROPH/DIAG INJ SC/IM: CPT | Performed by: PHYSICIAN ASSISTANT

## 2022-08-04 PROCEDURE — 1123F ACP DISCUSS/DSCN MKR DOCD: CPT | Performed by: PHYSICIAN ASSISTANT

## 2022-08-04 RX ORDER — METHYLPREDNISOLONE 4 MG/1
TABLET ORAL
Qty: 1 KIT | Refills: 0 | Status: SHIPPED
Start: 2022-08-04 | End: 2022-08-10

## 2022-08-04 RX ORDER — METHYLPREDNISOLONE ACETATE 40 MG/ML
40 INJECTION, SUSPENSION INTRA-ARTICULAR; INTRALESIONAL; INTRAMUSCULAR; SOFT TISSUE ONCE
Status: COMPLETED | OUTPATIENT
Start: 2022-08-04 | End: 2022-08-04

## 2022-08-04 RX ADMIN — METHYLPREDNISOLONE ACETATE 40 MG: 40 INJECTION, SUSPENSION INTRA-ARTICULAR; INTRALESIONAL; INTRAMUSCULAR; SOFT TISSUE at 10:35
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
## 2022-08-10 ENCOUNTER — OFFICE VISIT (OUTPATIENT)
Dept: PRIMARY CARE CLINIC | Age: 77
End: 2022-08-10
Payer: MEDICARE

## 2022-08-10 VITALS
WEIGHT: 146.6 LBS | DIASTOLIC BLOOD PRESSURE: 78 MMHG | BODY MASS INDEX: 26.98 KG/M2 | SYSTOLIC BLOOD PRESSURE: 138 MMHG | TEMPERATURE: 97.2 F | HEIGHT: 62 IN

## 2022-08-10 DIAGNOSIS — G89.29 CHRONIC BILATERAL LOW BACK PAIN WITHOUT SCIATICA: ICD-10-CM

## 2022-08-10 DIAGNOSIS — E03.9 ACQUIRED HYPOTHYROIDISM: ICD-10-CM

## 2022-08-10 DIAGNOSIS — R53.83 OTHER FATIGUE: ICD-10-CM

## 2022-08-10 DIAGNOSIS — M54.50 CHRONIC BILATERAL LOW BACK PAIN WITHOUT SCIATICA: ICD-10-CM

## 2022-08-10 DIAGNOSIS — D50.8 OTHER IRON DEFICIENCY ANEMIA: ICD-10-CM

## 2022-08-10 DIAGNOSIS — M41.25 OTHER IDIOPATHIC SCOLIOSIS, THORACOLUMBAR REGION: ICD-10-CM

## 2022-08-10 DIAGNOSIS — M51.16 HERNIATION OF LUMBAR INTERVERTEBRAL DISC WITH RADICULOPATHY: Primary | ICD-10-CM

## 2022-08-10 PROCEDURE — 1123F ACP DISCUSS/DSCN MKR DOCD: CPT | Performed by: FAMILY MEDICINE

## 2022-08-10 PROCEDURE — 99214 OFFICE O/P EST MOD 30 MIN: CPT | Performed by: FAMILY MEDICINE

## 2022-08-10 ASSESSMENT — PATIENT HEALTH QUESTIONNAIRE - PHQ9
SUM OF ALL RESPONSES TO PHQ QUESTIONS 1-9: 0
SUM OF ALL RESPONSES TO PHQ9 QUESTIONS 1 & 2: 0
2. FEELING DOWN, DEPRESSED OR HOPELESS: 0
SUM OF ALL RESPONSES TO PHQ QUESTIONS 1-9: 0
SUM OF ALL RESPONSES TO PHQ QUESTIONS 1-9: 0
1. LITTLE INTEREST OR PLEASURE IN DOING THINGS: 0
SUM OF ALL RESPONSES TO PHQ QUESTIONS 1-9: 0
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
## 2022-08-15 DIAGNOSIS — E11.9 DIET-CONTROLLED DIABETES MELLITUS (HCC): ICD-10-CM

## 2022-08-15 DIAGNOSIS — M35.01 SJOGREN'S SYNDROME WITH KERATOCONJUNCTIVITIS SICCA (HCC): ICD-10-CM

## 2022-08-15 DIAGNOSIS — D51.8 VITAMIN B12 DEFICIENCY (DIETARY) ANEMIA: ICD-10-CM

## 2022-08-15 DIAGNOSIS — M81.0 AGE-RELATED OSTEOPOROSIS WITHOUT CURRENT PATHOLOGICAL FRACTURE: ICD-10-CM

## 2022-08-15 DIAGNOSIS — D50.8 OTHER IRON DEFICIENCY ANEMIA: ICD-10-CM

## 2022-08-15 DIAGNOSIS — E03.9 ACQUIRED HYPOTHYROIDISM: ICD-10-CM

## 2022-08-15 LAB
ALBUMIN SERPL-MCNC: 4.4 G/DL (ref 3.5–5.2)
ALP BLD-CCNC: 61 U/L (ref 35–104)
ALT SERPL-CCNC: 12 U/L (ref 0–32)
ANION GAP SERPL CALCULATED.3IONS-SCNC: 14 MMOL/L (ref 7–16)
AST SERPL-CCNC: 22 U/L (ref 0–31)
BASOPHILS ABSOLUTE: 0.06 E9/L (ref 0–0.2)
BASOPHILS RELATIVE PERCENT: 0.9 % (ref 0–2)
BILIRUB SERPL-MCNC: 0.4 MG/DL (ref 0–1.2)
BILIRUBIN URINE: NEGATIVE
BLOOD, URINE: NEGATIVE
BUN BLDV-MCNC: 23 MG/DL (ref 6–23)
CALCIUM SERPL-MCNC: 9.3 MG/DL (ref 8.6–10.2)
CHLORIDE BLD-SCNC: 108 MMOL/L (ref 98–107)
CHOLESTEROL, TOTAL: 182 MG/DL (ref 0–199)
CLARITY: CLEAR
CO2: 22 MMOL/L (ref 22–29)
COLOR: YELLOW
CREAT SERPL-MCNC: 1 MG/DL (ref 0.5–1)
EOSINOPHILS ABSOLUTE: 0.37 E9/L (ref 0.05–0.5)
EOSINOPHILS RELATIVE PERCENT: 5.6 % (ref 0–6)
GFR AFRICAN AMERICAN: >60
GFR NON-AFRICAN AMERICAN: 54 ML/MIN/1.73
GLUCOSE BLD-MCNC: 95 MG/DL (ref 74–99)
GLUCOSE URINE: NEGATIVE MG/DL
HBA1C MFR BLD: 5.4 % (ref 4–5.6)
HCT VFR BLD CALC: 42.6 % (ref 34–48)
HDLC SERPL-MCNC: 56 MG/DL
HEMOGLOBIN: 13.9 G/DL (ref 11.5–15.5)
IMMATURE GRANULOCYTES #: 0.02 E9/L
IMMATURE GRANULOCYTES %: 0.3 % (ref 0–5)
IRON: 73 MCG/DL (ref 37–145)
KETONES, URINE: NEGATIVE MG/DL
LDL CHOLESTEROL CALCULATED: 98 MG/DL (ref 0–99)
LEUKOCYTE ESTERASE, URINE: NEGATIVE
LYMPHOCYTES ABSOLUTE: 1.62 E9/L (ref 1.5–4)
LYMPHOCYTES RELATIVE PERCENT: 24.7 % (ref 20–42)
MCH RBC QN AUTO: 32 PG (ref 26–35)
MCHC RBC AUTO-ENTMCNC: 32.6 % (ref 32–34.5)
MCV RBC AUTO: 97.9 FL (ref 80–99.9)
MONOCYTES ABSOLUTE: 0.71 E9/L (ref 0.1–0.95)
MONOCYTES RELATIVE PERCENT: 10.8 % (ref 2–12)
NEUTROPHILS ABSOLUTE: 3.77 E9/L (ref 1.8–7.3)
NEUTROPHILS RELATIVE PERCENT: 57.7 % (ref 43–80)
NITRITE, URINE: NEGATIVE
PDW BLD-RTO: 13 FL (ref 11.5–15)
PH UA: 5.5 (ref 5–9)
PLATELET # BLD: 187 E9/L (ref 130–450)
PMV BLD AUTO: 10.1 FL (ref 7–12)
POTASSIUM SERPL-SCNC: 5.4 MMOL/L (ref 3.5–5)
PROTEIN UA: NEGATIVE MG/DL
RBC # BLD: 4.35 E12/L (ref 3.5–5.5)
SODIUM BLD-SCNC: 144 MMOL/L (ref 132–146)
SPECIFIC GRAVITY UA: >=1.03 (ref 1–1.03)
T4 TOTAL: 7.6 MCG/DL (ref 4.5–11.7)
TOTAL PROTEIN: 6.7 G/DL (ref 6.4–8.3)
TRIGL SERPL-MCNC: 140 MG/DL (ref 0–149)
TSH SERPL DL<=0.05 MIU/L-ACNC: 0.02 UIU/ML (ref 0.27–4.2)
UROBILINOGEN, URINE: 0.2 E.U./DL
VITAMIN B-12: 465 PG/ML (ref 211–946)
VITAMIN D 25-HYDROXY: 101 NG/ML (ref 30–100)
VLDLC SERPL CALC-MCNC: 28 MG/DL
WBC # BLD: 6.6 E9/L (ref 4.5–11.5)

## 2022-08-17 ENCOUNTER — OFFICE VISIT (OUTPATIENT)
Dept: PRIMARY CARE CLINIC | Age: 77
End: 2022-08-17
Payer: MEDICARE

## 2022-08-17 DIAGNOSIS — M81.0 AGE-RELATED OSTEOPOROSIS WITHOUT CURRENT PATHOLOGICAL FRACTURE: ICD-10-CM

## 2022-08-17 DIAGNOSIS — M41.25 OTHER IDIOPATHIC SCOLIOSIS, THORACOLUMBAR REGION: ICD-10-CM

## 2022-08-17 DIAGNOSIS — R41.3 MEMORY IMPAIRMENT: ICD-10-CM

## 2022-08-17 DIAGNOSIS — M15.9 PRIMARY OSTEOARTHRITIS INVOLVING MULTIPLE JOINTS: Chronic | ICD-10-CM

## 2022-08-17 DIAGNOSIS — M35.09 SJOGREN'S SYNDROME WITH OTHER ORGAN INVOLVEMENT (HCC): Chronic | ICD-10-CM

## 2022-08-17 DIAGNOSIS — G25.0 BENIGN ESSENTIAL TREMOR: ICD-10-CM

## 2022-08-17 DIAGNOSIS — M51.16 HERNIATION OF LUMBAR INTERVERTEBRAL DISC WITH RADICULOPATHY: Primary | ICD-10-CM

## 2022-08-17 DIAGNOSIS — E03.9 ACQUIRED HYPOTHYROIDISM: ICD-10-CM

## 2022-08-17 DIAGNOSIS — I10 ESSENTIAL HYPERTENSION: ICD-10-CM

## 2022-08-17 PROCEDURE — 99214 OFFICE O/P EST MOD 30 MIN: CPT | Performed by: FAMILY MEDICINE

## 2022-08-17 PROCEDURE — 1123F ACP DISCUSS/DSCN MKR DOCD: CPT | Performed by: FAMILY MEDICINE
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
## 2022-08-18 ENCOUNTER — TELEPHONE (OUTPATIENT)
Dept: PRIMARY CARE CLINIC | Age: 77
End: 2022-08-18

## 2022-08-18 VITALS
SYSTOLIC BLOOD PRESSURE: 135 MMHG | WEIGHT: 147 LBS | HEIGHT: 62 IN | TEMPERATURE: 98.5 F | BODY MASS INDEX: 27.05 KG/M2 | DIASTOLIC BLOOD PRESSURE: 82 MMHG

## 2022-08-18 DIAGNOSIS — U07.1 COVID-19: Primary | ICD-10-CM

## 2022-08-18 PROBLEM — R41.3 MEMORY IMPAIRMENT: Status: ACTIVE | Noted: 2022-08-18

## 2022-08-18 LAB — THYROID STIMULATING IMMUNOGLOBULIN: <0.1 IU/L

## 2022-08-18 RX ORDER — AZITHROMYCIN 250 MG/1
250 TABLET, FILM COATED ORAL SEE ADMIN INSTRUCTIONS
Qty: 6 TABLET | Refills: 0 | Status: SHIPPED | OUTPATIENT
Start: 2022-08-18 | End: 2022-08-23

## 2022-08-18 RX ORDER — METHYLPREDNISOLONE 4 MG/1
TABLET ORAL
Qty: 1 KIT | Refills: 0 | Status: SHIPPED | OUTPATIENT
Start: 2022-08-18

## 2022-08-18 ASSESSMENT — ENCOUNTER SYMPTOMS
ALLERGIC/IMMUNOLOGIC NEGATIVE: 1
GASTROINTESTINAL NEGATIVE: 1
BACK PAIN: 1
RESPIRATORY NEGATIVE: 1
EYES NEGATIVE: 1

## 2022-08-18 NOTE — TELEPHONE ENCOUNTER
Notify patient to medication sent. Increase fluids. Zinc vitamin C. Purchase pulse ox check frequently with ambulation call if less than 90 if less than 85 and consistently stays or go to ER.   Let us know if she is not better in a day or 2

## 2022-08-18 NOTE — TELEPHONE ENCOUNTER
Patient started not feeling well last night. Coughing, fever. Covid + on home test this morning.   Please advise

## 2022-08-18 NOTE — TELEPHONE ENCOUNTER
The pt had a rough night and ended up with a fever she tested herself for covid this morning and it was positive, she is calling to see if you wanted to prescribe something for her, she uses [facility]

## 2022-09-23 ENCOUNTER — TELEPHONE (OUTPATIENT)
Dept: PRIMARY CARE CLINIC | Age: 77
End: 2022-09-23

## 2022-09-23 DIAGNOSIS — M35.09 SJOGREN'S SYNDROME WITH OTHER ORGAN INVOLVEMENT (HCC): Primary | ICD-10-CM

## 2022-09-23 NOTE — TELEPHONE ENCOUNTER
The pt needs a refill on a medication that she was getting from the rheumatologist she was seeing, she is no longer seeing the doctor, the medication she is asking to get refilled is Pilocarpine 5 mg 1 q 8 hours, she is also asking if she can get a referral to a new rheumatologist in the area

## 2022-09-27 RX ORDER — PILOCARPINE HYDROCHLORIDE 5 MG/1
5 TABLET, FILM COATED ORAL 3 TIMES DAILY
Qty: 270 TABLET | Refills: 5 | Status: SHIPPED | OUTPATIENT
Start: 2022-09-27

## 2022-09-27 NOTE — TELEPHONE ENCOUNTER
Notify patient medication sent to [facility] and I put a referral in for her rheumatologist but it may take several weeks

## 2022-10-13 RX ORDER — LEVOTHYROXINE SODIUM 112 UG/1
112 TABLET ORAL DAILY
Qty: 90 TABLET | Refills: 5 | Status: SHIPPED | OUTPATIENT
Start: 2022-10-13

## 2022-10-19 ENCOUNTER — EVALUATION (OUTPATIENT)
Dept: PHYSICAL THERAPY | Age: 77
End: 2022-10-19
Payer: MEDICARE

## 2022-10-19 DIAGNOSIS — M35.09 SJOGREN'S SYNDROME WITH OTHER ORGAN INVOLVEMENT (HCC): Primary | ICD-10-CM

## 2022-10-19 DIAGNOSIS — M54.50 LOW BACK PAIN, UNSPECIFIED BACK PAIN LATERALITY, UNSPECIFIED CHRONICITY, UNSPECIFIED WHETHER SCIATICA PRESENT: Primary | ICD-10-CM

## 2022-10-19 PROCEDURE — 97112 NEUROMUSCULAR REEDUCATION: CPT | Performed by: PHYSICAL THERAPIST

## 2022-10-19 PROCEDURE — 97162 PT EVAL MOD COMPLEX 30 MIN: CPT | Performed by: PHYSICAL THERAPIST

## 2022-10-19 RX ORDER — HYDROXYCHLOROQUINE SULFATE 200 MG/1
200 TABLET, FILM COATED ORAL DAILY
Qty: 90 TABLET | Refills: 3 | OUTPATIENT
Start: 2022-10-19

## 2022-10-19 NOTE — PROGRESS NOTES
[address] [facility]   Phone: [phone]   Fax: [phone]      Physical Therapy Treatment Note    Date: 10/19/2022  Patient Name: Mayank Tomlinson  :    MRN: [id number]  [facility]: years  DOSx: NA  Referring Provider: DO Stephanie Kilpatrick 00, [address]     Medical Diagnosis:    Diagnosis Orders   1. Low back pain, unspecified back pain laterality, unspecified chronicity, unspecified whether sciatica present            Outcome Measure: JACKIE 22% limitation    S: see eval  O:  Time 7392-0805     Visit 1/4 Repeat outcome measure at mid point and end. Pain 0/10                       Modalities      MH + ES            Education            Stretching                                 Functional activities To aid in reaching , pushing, pulling tasks at home       \"      \"      \"      \"      \"     THEREX     Bridging c hip add, c hip abd, normal X30 3s holds BTB NR   SL clams X30 3s holds BTB NR                                                                                             A:  Tolerated well. Above added to written HEP and will perform daily.   P: Continue with rehab plan  Sharon Michael, PT  PT DPT [id number]    Treatment Charges: Mins Units   Initial Evaluation 20 1   Re-Evaluation     Ther Exercise         TE     Manual Therapy     MT     Ther Activities        TA     Gait Training          GT     Neuro Re-education NR 15 1   Modalities     Non-Billable Service Time     Other     Total Time/Units 35 2
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
## 2022-10-25 RX ORDER — HYDROXYCHLOROQUINE SULFATE 200 MG/1
200 TABLET, FILM COATED ORAL DAILY
Qty: 90 TABLET | Refills: 0 | Status: SHIPPED | OUTPATIENT
Start: 2022-10-25

## 2022-10-26 ENCOUNTER — TREATMENT (OUTPATIENT)
Dept: PHYSICAL THERAPY | Age: 77
End: 2022-10-26
Payer: MEDICARE

## 2022-10-26 DIAGNOSIS — M54.50 LOW BACK PAIN, UNSPECIFIED BACK PAIN LATERALITY, UNSPECIFIED CHRONICITY, UNSPECIFIED WHETHER SCIATICA PRESENT: Primary | ICD-10-CM

## 2022-10-26 PROCEDURE — 97112 NEUROMUSCULAR REEDUCATION: CPT

## 2022-10-26 NOTE — PROGRESS NOTES
[address] [facility]   Phone: [phone]   Fax: [phone]      Physical Therapy Treatment Note    Date: 10/26/2022  Patient Name: Glory Holbrook  : [birth year]   MRN: [id number]  [facility]: years  DOSx: NA  Referring Provider: Laqueta Phalen, DO [address], [address]     Medical Diagnosis:    Low back pain, unspecified back pain laterality, unspecified chronicity, unspecified whether sciatica present              Outcome Measure: JACKIE 22% limitation    S:  Patient reports she was raking the leaves and notices she is sore today. Has some c/o of UT tightness and discomfort across chest.    O:  Time 6003-5552     Visit 2/4 Repeat outcome measure at mid point and end. Pain 0/10                       Modalities      MH + ES            Education            Stretching                                 THEREX     Bridging c hip add, c hip abd, normal X30 3s holds BTB NR   SL clams X30 3s holds BTB NR   S/L hip abduction X30 3s holds BTB NR   Supine Clamshells X30 3s holds BTB NR         Resisted step-outs - fwd/lat/bwd x30 YTB NR         Standing marches on foam 3 mins      Foam step-ups  2 min each Fwd/lateral- up and over  NR                                                   A:  Tolerated well. Patient was able to progress c resisted step-outs this treatment session and was able to perform c proper form and pacing techniques. Continued to focus on improving balance training in order to improve safe home/community ambulation. She has c/o of pain across UT region and states some discomfort in her chest region. Educated if pain persist to contact her doctor and request a script to treat.     P: Continue with rehab plan  Marco Gardnerford, PTA  [id number]    Treatment Charges: Mins Units   Initial Evaluation     Re-Evaluation     Ther Exercise         TE     Manual Therapy     MT     Ther Activities        TA     Gait Training          GT     Neuro Re-education NR 54 4 Modalities     Non-Billable Service Time     Other     Total Time/Units 54 4

## 2022-10-31 DIAGNOSIS — J30.2 SEASONAL ALLERGIC RHINITIS, UNSPECIFIED TRIGGER: ICD-10-CM

## 2022-10-31 RX ORDER — FLUTICASONE PROPIONATE 50 MCG
2 SPRAY, SUSPENSION (ML) NASAL
Qty: 16 G | Refills: 12 | Status: SHIPPED | OUTPATIENT
Start: 2022-10-31

## 2022-11-09 ENCOUNTER — TREATMENT (OUTPATIENT)
Dept: PHYSICAL THERAPY | Age: 77
End: 2022-11-09

## 2022-12-27 DIAGNOSIS — M81.0 AGE-RELATED OSTEOPOROSIS WITHOUT CURRENT PATHOLOGICAL FRACTURE: ICD-10-CM

## 2022-12-27 DIAGNOSIS — I10 ESSENTIAL HYPERTENSION: ICD-10-CM

## 2022-12-27 DIAGNOSIS — M35.09 SJOGREN'S SYNDROME WITH OTHER ORGAN INVOLVEMENT (HCC): Chronic | ICD-10-CM

## 2022-12-27 DIAGNOSIS — E03.9 ACQUIRED HYPOTHYROIDISM: ICD-10-CM

## 2022-12-27 LAB
ALBUMIN SERPL-MCNC: 4.3 G/DL (ref 3.5–5.2)
ALP BLD-CCNC: 66 U/L (ref 35–104)
ALT SERPL-CCNC: 17 U/L (ref 0–32)
ANION GAP SERPL CALCULATED.3IONS-SCNC: 8 MMOL/L (ref 7–16)
AST SERPL-CCNC: 25 U/L (ref 0–31)
BASOPHILS ABSOLUTE: 0.07 E9/L (ref 0–0.2)
BASOPHILS RELATIVE PERCENT: 0.9 % (ref 0–2)
BILIRUB SERPL-MCNC: 0.4 MG/DL (ref 0–1.2)
BUN BLDV-MCNC: 20 MG/DL (ref 6–23)
CALCIUM SERPL-MCNC: 9.5 MG/DL (ref 8.6–10.2)
CHLORIDE BLD-SCNC: 107 MMOL/L (ref 98–107)
CHOLESTEROL, TOTAL: 162 MG/DL (ref 0–199)
CO2: 27 MMOL/L (ref 22–29)
CREAT SERPL-MCNC: 0.9 MG/DL (ref 0.5–1)
EOSINOPHILS ABSOLUTE: 0.49 E9/L (ref 0.05–0.5)
EOSINOPHILS RELATIVE PERCENT: 6 % (ref 0–6)
GFR SERPL CREATININE-BSD FRML MDRD: >60 ML/MIN/1.73
GLUCOSE BLD-MCNC: 91 MG/DL (ref 74–99)
HCT VFR BLD CALC: 41.9 % (ref 34–48)
HDLC SERPL-MCNC: 51 MG/DL
HEMOGLOBIN: 14 G/DL (ref 11.5–15.5)
IMMATURE GRANULOCYTES #: 0.02 E9/L
IMMATURE GRANULOCYTES %: 0.2 % (ref 0–5)
LDL CHOLESTEROL CALCULATED: 83 MG/DL (ref 0–99)
LYMPHOCYTES ABSOLUTE: 1.33 E9/L (ref 1.5–4)
LYMPHOCYTES RELATIVE PERCENT: 16.4 % (ref 20–42)
MCH RBC QN AUTO: 31.6 PG (ref 26–35)
MCHC RBC AUTO-ENTMCNC: 33.4 % (ref 32–34.5)
MCV RBC AUTO: 94.6 FL (ref 80–99.9)
MONOCYTES ABSOLUTE: 0.77 E9/L (ref 0.1–0.95)
MONOCYTES RELATIVE PERCENT: 9.5 % (ref 2–12)
NEUTROPHILS ABSOLUTE: 5.42 E9/L (ref 1.8–7.3)
NEUTROPHILS RELATIVE PERCENT: 67 % (ref 43–80)
PDW BLD-RTO: 12.6 FL (ref 11.5–15)
PLATELET # BLD: 194 E9/L (ref 130–450)
PMV BLD AUTO: 10.3 FL (ref 7–12)
POTASSIUM SERPL-SCNC: 5.5 MMOL/L (ref 3.5–5)
RBC # BLD: 4.43 E12/L (ref 3.5–5.5)
SODIUM BLD-SCNC: 142 MMOL/L (ref 132–146)
T4 TOTAL: 8.4 MCG/DL (ref 4.5–11.7)
TOTAL PROTEIN: 6.6 G/DL (ref 6.4–8.3)
TRIGL SERPL-MCNC: 139 MG/DL (ref 0–149)
TSH SERPL DL<=0.05 MIU/L-ACNC: 0.02 UIU/ML (ref 0.27–4.2)
VITAMIN D 25-HYDROXY: 72 NG/ML (ref 30–100)
VLDLC SERPL CALC-MCNC: 28 MG/DL
WBC # BLD: 8.1 E9/L (ref 4.5–11.5)

## 2022-12-28 ENCOUNTER — OFFICE VISIT (OUTPATIENT)
Dept: PRIMARY CARE CLINIC | Age: 77
End: 2022-12-28
Payer: MEDICARE

## 2022-12-28 VITALS
DIASTOLIC BLOOD PRESSURE: 82 MMHG | TEMPERATURE: 97.9 F | BODY MASS INDEX: 26.46 KG/M2 | HEIGHT: 62 IN | WEIGHT: 143.8 LBS | SYSTOLIC BLOOD PRESSURE: 142 MMHG

## 2022-12-28 DIAGNOSIS — M81.0 AGE-RELATED OSTEOPOROSIS WITHOUT CURRENT PATHOLOGICAL FRACTURE: ICD-10-CM

## 2022-12-28 DIAGNOSIS — G25.0 BENIGN ESSENTIAL TREMOR: ICD-10-CM

## 2022-12-28 DIAGNOSIS — E11.9 DIET-CONTROLLED DIABETES MELLITUS (HCC): ICD-10-CM

## 2022-12-28 DIAGNOSIS — M35.09 SJOGREN'S SYNDROME WITH OTHER ORGAN INVOLVEMENT (HCC): Primary | Chronic | ICD-10-CM

## 2022-12-28 DIAGNOSIS — D50.8 OTHER IRON DEFICIENCY ANEMIA: ICD-10-CM

## 2022-12-28 DIAGNOSIS — E53.8 VITAMIN B 12 DEFICIENCY: ICD-10-CM

## 2022-12-28 DIAGNOSIS — E03.9 ACQUIRED HYPOTHYROIDISM: Chronic | ICD-10-CM

## 2022-12-28 DIAGNOSIS — I10 ESSENTIAL HYPERTENSION: Chronic | ICD-10-CM

## 2022-12-28 PROCEDURE — 1123F ACP DISCUSS/DSCN MKR DOCD: CPT | Performed by: FAMILY MEDICINE

## 2022-12-28 PROCEDURE — 3074F SYST BP LT 130 MM HG: CPT | Performed by: FAMILY MEDICINE

## 2022-12-28 PROCEDURE — 3044F HG A1C LEVEL LT 7.0%: CPT | Performed by: FAMILY MEDICINE

## 2022-12-28 PROCEDURE — 3078F DIAST BP <80 MM HG: CPT | Performed by: FAMILY MEDICINE

## 2022-12-28 PROCEDURE — 99214 OFFICE O/P EST MOD 30 MIN: CPT | Performed by: FAMILY MEDICINE

## 2022-12-28 ASSESSMENT — PATIENT HEALTH QUESTIONNAIRE - PHQ9
SUM OF ALL RESPONSES TO PHQ QUESTIONS 1-9: 0
2. FEELING DOWN, DEPRESSED OR HOPELESS: 0
1. LITTLE INTEREST OR PLEASURE IN DOING THINGS: 0
SUM OF ALL RESPONSES TO PHQ QUESTIONS 1-9: 0
SUM OF ALL RESPONSES TO PHQ9 QUESTIONS 1 & 2: 0
SUM OF ALL RESPONSES TO PHQ QUESTIONS 1-9: 0
SUM OF ALL RESPONSES TO PHQ QUESTIONS 1-9: 0

## 2022-12-28 ASSESSMENT — ENCOUNTER SYMPTOMS
RESPIRATORY NEGATIVE: 1
ALLERGIC/IMMUNOLOGIC NEGATIVE: 1
GASTROINTESTINAL NEGATIVE: 1
EYES NEGATIVE: 1
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
## 2023-01-09 NOTE — PROGRESS NOTES
[address] [facility]   Phone: [phone]   Fax: [phone]    Discharge Summary        REASON FOR DISCHARGE: Pt cx follow up appt stating she was too busy. She will call back in the new year if needed. PATIENT EDUCATION/INSTRUCTIONS: continue HEP as instructed    RECOMMENDATIONS: call c questions or if additional services are warranted. Thank you for the opportunity to work with your patient. If you have questions or comments, please contact me at numbers listed above.       Cristel Khan, PT PT , DPT PT [id number] 1/9/2023

## 2023-01-11 ENCOUNTER — OFFICE VISIT (OUTPATIENT)
Dept: RHEUMATOLOGY | Age: 78
End: 2023-01-11
Payer: MEDICARE

## 2023-01-11 VITALS
DIASTOLIC BLOOD PRESSURE: 80 MMHG | HEART RATE: 60 BPM | OXYGEN SATURATION: 98 % | HEIGHT: 62 IN | RESPIRATION RATE: 18 BRPM | BODY MASS INDEX: 26.31 KG/M2 | SYSTOLIC BLOOD PRESSURE: 130 MMHG | WEIGHT: 143 LBS

## 2023-01-11 DIAGNOSIS — M35.09 SJOGREN'S SYNDROME WITH OTHER ORGAN INVOLVEMENT (HCC): Primary | ICD-10-CM

## 2023-01-11 DIAGNOSIS — Z79.899 HIGH RISK MEDICATION USE: ICD-10-CM

## 2023-01-11 DIAGNOSIS — M81.0 AGE-RELATED OSTEOPOROSIS WITHOUT CURRENT PATHOLOGICAL FRACTURE: ICD-10-CM

## 2023-01-11 DIAGNOSIS — M35.09 SJOGREN'S SYNDROME WITH OTHER ORGAN INVOLVEMENT (HCC): ICD-10-CM

## 2023-01-11 DIAGNOSIS — M15.9 GENERALIZED OSTEOARTHRITIS: ICD-10-CM

## 2023-01-11 LAB
BILIRUBIN URINE: NEGATIVE
BLOOD, URINE: NEGATIVE
C-REACTIVE PROTEIN: <0.3 MG/DL (ref 0–0.4)
C3 COMPLEMENT: 133 MG/DL (ref 90–180)
C4 COMPLEMENT: 21 MG/DL (ref 10–40)
CLARITY: CLEAR
COLOR: YELLOW
GLUCOSE URINE: NEGATIVE MG/DL
KETONES, URINE: NEGATIVE MG/DL
LEUKOCYTE ESTERASE, URINE: NEGATIVE
NITRITE, URINE: NEGATIVE
PH UA: 5.5 (ref 5–9)
PROTEIN UA: NEGATIVE MG/DL
RHEUMATOID FACTOR: 10 IU/ML (ref 0–13)
SPECIFIC GRAVITY UA: 1.02 (ref 1–1.03)
UROBILINOGEN, URINE: 0.2 E.U./DL

## 2023-01-11 PROCEDURE — 1123F ACP DISCUSS/DSCN MKR DOCD: CPT | Performed by: INTERNAL MEDICINE

## 2023-01-11 PROCEDURE — 3079F DIAST BP 80-89 MM HG: CPT | Performed by: INTERNAL MEDICINE

## 2023-01-11 PROCEDURE — 3075F SYST BP GE 130 - 139MM HG: CPT | Performed by: INTERNAL MEDICINE

## 2023-01-11 PROCEDURE — 99204 OFFICE O/P NEW MOD 45 MIN: CPT | Performed by: INTERNAL MEDICINE

## 2023-01-11 RX ORDER — HYDROXYCHLOROQUINE SULFATE 200 MG/1
300 TABLET, FILM COATED ORAL DAILY
Qty: 135 TABLET | Refills: 1 | Status: SHIPPED | OUTPATIENT
Start: 2023-01-11

## 2023-01-11 RX ORDER — CEVIMELINE HYDROCHLORIDE 30 MG/1
30 CAPSULE ORAL 3 TIMES DAILY
Qty: 90 CAPSULE | Refills: 3 | Status: SHIPPED | OUTPATIENT
Start: 2023-01-11

## 2023-01-11 ASSESSMENT — ENCOUNTER SYMPTOMS
SHORTNESS OF BREATH: 0
DIARRHEA: 0
TROUBLE SWALLOWING: 0
VOMITING: 0
NAUSEA: 0
COLOR CHANGE: 0
ABDOMINAL PAIN: 0
COUGH: 0

## 2023-01-11 NOTE — PROGRESS NOTES
Wilmer Angela 1945 is a 69 y.o. female, here for evaluation of the following chief complaint(s):  New Patient (Patient here as a new patient for Sjogrens syndrome. )         ASSESSMENT/PLAN:    Wilmer Angela 1945 is a 69 y.o. female seen in Consult for Sjogren's syndrome. 1.  Sjogren's syndrome-manifest as positive SSA and SSB, sicca symptoms, mild inflammatory arthritis. Doing well on Plaquenil 200 mg daily but still with some arthralgias so we will increase to 300 mg daily which would be the standard dose based on her weight. As far as sicca symptoms her dry eyes are manageable with artificial tears and Systane. As far as dry mouth she is now on pilocarpine because cevimeline became too expensive however, cevimeline was more beneficial.  We will try going back on cevimeline now that we can say that pilocarpine does not work as well. If it is still too expensive we will unfortunately have to go back to pilocarpine. We will get further work-up as below to get a better characterization of her disease. 2.  Medication monitoring-she follows with the eye doctor regularly while on Plaquenil. 3.  Osteoarthritis-she can continue Tylenol and Advil on a as needed basis. 4.  Osteoporosis-on vitamin D supplementation. She was on Reclast at 1 point. Her last dose was 2019. She is overdue for repeat bone density scan we will check that today. If she is still in the osteoporosis range or osteopenia with high FRAX we will put her back on Reclast.    1. Sjogren's syndrome with other organ involvement ([facility]  -     C3 Complement; Future  -     C4 Complement; Future  -     Anti-RNP (SADAF Ab); Future  -     Negro (SADAF) Antibody IgG; Future  -     Anti-DNA Antibody, Double-Stranded; Future  -     Urinalysis; Future  -     Rheumatoid Factor; Future  -     Sedimentation Rate; Future  -     C-Reactive Protein; Future  -     Immunofixation serum profile;  Future  -     Electrophoresis Protein, Serum; Future  -     JB; Future  -     hydroxychloroquine (PLAQUENIL) 200 MG tablet; Take 1.5 tablets by mouth daily, Disp-135 tablet, R-1Normal  2. High risk medication use  -     C3 Complement; Future  -     C4 Complement; Future  -     Anti-RNP (SADAF Ab); Future  -     Negro (SADAF) Antibody IgG; Future  -     Anti-DNA Antibody, Double-Stranded; Future  -     Urinalysis; Future  -     Rheumatoid Factor; Future  -     Sedimentation Rate; Future  -     C-Reactive Protein; Future  -     Immunofixation serum profile; Future  -     Electrophoresis Protein, Serum; Future  -     JB; Future  3. Age-related osteoporosis without current pathological fracture  -     DEXA BONE DENSITY 2 SITES; Future  4. Generalized osteoarthritis      Return in about 6 months (around 7/11/2023). Subjective   SUBJECTIVE/OBJECTIVE:    HPI: Luigi Pineda 1945 is a 69 y.o. female seen in consult for Sjogren's syndrome. This is a longstanding issue. She had seen a few different rheumatologist in the past.  Her manifestations are positive SSA and SSB, significant sicca symptoms and possibly some component of inflammatory arthritis. She is on Plaquenil 200 mg daily. She uses artificial tears and Systane eyedrops. She was on cevimeline but there was a change in price and it became too expensive so she was switched to pilocarpine which does not work as well as the cevimeline did. She does have a faint erythematous rash which is most likely eczema. She has not had any other extraglandular manifestations. She does have osteoarthritis and will take Advil and Tylenol on a as needed basis. She has a history of osteoporosis as well and was on Reclast at 1 point. She is overdue for a bone density scan. Her last Reclast was in 2019.     Past Medical History:   Diagnosis Date    ANS (arteriolar nephrosclerosis)     Anxiety 2018    Arrhythmia 1995    Atrial Fibrillation 1995, Reentrant AV elfego tachycardia, radiofrequency ablation March 1999 Juan Ache  / follow with Dr. Rose Cordero every year    Atrial fibrillation [facility])     AVNRT (AV elfego re-entry tachycardia) ([facility] 2008?    s/p ablation /resolved; noted 9/28/18- had recent fast heart rate & hypertensoion & seeing Dr. Sara Berg  9/28/18    Benign essential tremor 4/6/2022    Benign head tremor 4/6/2022    Chronic kidney disease 05/2017    Depression     Dry eyes     Dry mouth     Fatigue     GERD (gastroesophageal reflux disease)     Glaucoma     left eyes    Glaucoma     HH (hiatus hernia)     Hyperlipidemia     Hypertension     Hypothyroidism     Menopause     Osteoarthritis     Sjogren's syndrome ([facility]     Spinal stenosis     at lower back    Stool incontinence 09/2018    SVT (supraventricular tachycardia) (HCC)     Tinnitus     Tremor         Review of Systems   Constitutional:  Negative for fatigue and fever. HENT:  Negative for mouth sores and trouble swallowing. Respiratory:  Negative for cough and shortness of breath. Cardiovascular:  Negative for chest pain. Gastrointestinal:  Negative for abdominal pain, diarrhea, nausea and vomiting. Genitourinary:  Negative for dysuria and hematuria. Musculoskeletal:  Positive for arthralgias. Negative for joint swelling. Skin:  Positive for rash. Negative for color change. Neurological:  Negative for weakness and numbness. Hematological:  Negative for adenopathy. All other systems reviewed and are negative. Objective   Vitals:    01/11/23 1355   BP: 130/80   Pulse: 60   Resp: 18   SpO2: 98%      Physical Exam  Constitutional:       General: She is not in acute distress. Appearance: Normal appearance. HENT:      Head: Normocephalic and atraumatic. Right Ear: External ear normal.      Left Ear: External ear normal.      Nose: Nose normal.   Eyes:      General: No scleral icterus. Pulmonary:      Effort: Pulmonary effort is normal.   Musculoskeletal:         General: Tenderness and deformity present. No swelling. Comments: She has Heberden's and Bindu's nodes and squaring of the first CMC joints bilaterally but no synovitis on exam.   Skin:     General: Skin is warm and dry. Findings: Rash present. Comments: She does have some faint areas of dry skin which is more consistent with eczema than psoriasis but states that it is more prominent other times. Neurological:      General: No focal deficit present. Mental Status: She is alert and oriented to person, place, and time. Mental status is at baseline. Psychiatric:         Mood and Affect: Mood normal.         Behavior: Behavior normal.          Lab Results   Component Value Date    WBC 8.1 12/27/2022    HGB 14.0 12/27/2022    HCT 41.9 12/27/2022    MCV 94.6 12/27/2022     12/27/2022     Lab Results   Component Value Date     12/27/2022    K 5.5 (H) 12/27/2022     12/27/2022    CO2 27 12/27/2022    BUN 20 12/27/2022    CREATININE 0.9 12/27/2022    GLUCOSE 91 12/27/2022    CALCIUM 9.5 12/27/2022    PROT 6.6 12/27/2022    LABALBU 4.3 12/27/2022    BILITOT 0.4 12/27/2022    ALKPHOS 66 12/27/2022    AST 25 12/27/2022    ALT 17 12/27/2022    LABGLOM >60 12/27/2022    GFRAA >60 08/15/2022     No results found for: JB  No results found for: RHEUMFACTOR  Lab Results   Component Value Date    SEDRATE 13 07/02/2021     Lab Results   Component Value Date    CRP 0.3 07/02/2021            An electronic signature was used to authenticate this note. This note was generated with a voice recognition dictation system. Please disregard any errors or omission which have escaped my review.     --Christopher Wolff,

## 2023-01-12 LAB
ANTI DNA DOUBLE STRANDED: NEGATIVE
SEDIMENTATION RATE, ERYTHROCYTE: 5 MM/HR (ref 0–20)

## 2023-01-13 LAB
ALBUMIN SERPL-MCNC: 3.1 G/DL (ref 3.5–4.7)
ALPHA-1-GLOBULIN: 0.3 G/DL (ref 0.2–0.4)
ALPHA-2-GLOBULIN: 1 G/DL (ref 0.5–1)
ANA PATTERN: ABNORMAL
ANA TITER: ABNORMAL
ANTI-NUCLEAR ANTIBODY (ANA): POSITIVE
BETA GLOBULIN: 1.1 G/DL (ref 0.8–1.3)
ELECTROPHORESIS: ABNORMAL
ENA TO RNP ANTIBODY: NEGATIVE
ENA TO SMITH (SM) ANTIBODY: NEGATIVE
GAMMA GLOBULIN: 0.9 G/DL (ref 0.7–1.6)
IMMUNOFIXATION RESULT, SERUM: NORMAL
TOTAL PROTEIN: 6.4 G/DL (ref 6.4–8.3)

## 2023-01-24 RX ORDER — METOPROLOL TARTRATE 50 MG/1
25 TABLET, FILM COATED ORAL 2 TIMES DAILY
Qty: 90 TABLET | Refills: 3 | Status: SHIPPED | OUTPATIENT
Start: 2023-01-24

## 2023-02-08 ENCOUNTER — HOSPITAL ENCOUNTER (OUTPATIENT)
Dept: GENERAL RADIOLOGY | Age: 78
Discharge: HOME | End: 2023-02-10
Payer: MEDICARE

## 2023-02-08 DIAGNOSIS — M81.0 AGE-RELATED OSTEOPOROSIS WITHOUT CURRENT PATHOLOGICAL FRACTURE: ICD-10-CM

## 2023-02-08 PROCEDURE — 77080 DXA BONE DENSITY AXIAL: CPT

## 2023-02-24 DIAGNOSIS — M85.89 OSTEOPENIA OF MULTIPLE SITES: Primary | ICD-10-CM

## 2023-02-24 RX ORDER — SODIUM CHLORIDE 9 MG/ML
INJECTION, SOLUTION INTRAVENOUS ONCE
Status: CANCELLED | OUTPATIENT
Start: 2023-02-24 | End: 2023-02-24

## 2023-02-24 RX ORDER — SODIUM CHLORIDE 9 MG/ML
5-250 INJECTION, SOLUTION INTRAVENOUS PRN
Status: CANCELLED | OUTPATIENT
Start: 2023-02-24

## 2023-02-24 RX ORDER — FAMOTIDINE 10 MG/ML
20 INJECTION, SOLUTION INTRAVENOUS
Status: CANCELLED | OUTPATIENT
Start: 2023-02-24

## 2023-02-24 RX ORDER — 0.9 % SODIUM CHLORIDE 0.9 %
250 INTRAVENOUS SOLUTION INTRAVENOUS ONCE
Status: CANCELLED | OUTPATIENT
Start: 2023-02-24 | End: 2023-02-24

## 2023-02-24 RX ORDER — SODIUM CHLORIDE 9 MG/ML
INJECTION, SOLUTION INTRAVENOUS CONTINUOUS
Status: CANCELLED | OUTPATIENT
Start: 2023-02-24

## 2023-02-24 RX ORDER — EPINEPHRINE 1 MG/ML
0.3 INJECTION, SOLUTION, CONCENTRATE INTRAVENOUS PRN
Status: CANCELLED | OUTPATIENT
Start: 2023-02-24

## 2023-02-24 RX ORDER — ALBUTEROL SULFATE 90 UG/1
4 AEROSOL, METERED RESPIRATORY (INHALATION) PRN
Status: CANCELLED | OUTPATIENT
Start: 2023-02-24

## 2023-02-24 RX ORDER — ONDANSETRON 2 MG/ML
8 INJECTION INTRAMUSCULAR; INTRAVENOUS
Status: CANCELLED | OUTPATIENT
Start: 2023-02-24

## 2023-02-24 RX ORDER — ACETAMINOPHEN 325 MG/1
650 TABLET ORAL
Status: CANCELLED | OUTPATIENT
Start: 2023-02-24

## 2023-02-24 RX ORDER — DIPHENHYDRAMINE HYDROCHLORIDE 50 MG/ML
50 INJECTION INTRAMUSCULAR; INTRAVENOUS
Status: CANCELLED | OUTPATIENT
Start: 2023-02-24

## 2023-02-24 RX ORDER — ZOLEDRONIC ACID 5 MG/100ML
5 INJECTION, SOLUTION INTRAVENOUS ONCE
Status: CANCELLED | OUTPATIENT
Start: 2023-02-24 | End: 2023-02-24

## 2023-02-24 RX ORDER — HEPARIN SODIUM (PORCINE) LOCK FLUSH IV SOLN 100 UNIT/ML 100 UNIT/ML
500 SOLUTION INTRAVENOUS PRN
Status: CANCELLED | OUTPATIENT
Start: 2023-02-24

## 2023-02-24 RX ORDER — SODIUM CHLORIDE 0.9 % (FLUSH) 0.9 %
5-40 SYRINGE (ML) INJECTION PRN
Status: CANCELLED | OUTPATIENT
Start: 2023-02-24

## 2023-03-15 DIAGNOSIS — M35.09 SJOGREN'S SYNDROME WITH OTHER ORGAN INVOLVEMENT (HCC): Chronic | ICD-10-CM

## 2023-03-15 DIAGNOSIS — M81.0 AGE-RELATED OSTEOPOROSIS WITHOUT CURRENT PATHOLOGICAL FRACTURE: ICD-10-CM

## 2023-03-15 DIAGNOSIS — E03.9 ACQUIRED HYPOTHYROIDISM: Chronic | ICD-10-CM

## 2023-03-15 DIAGNOSIS — I10 ESSENTIAL HYPERTENSION: Chronic | ICD-10-CM

## 2023-03-15 DIAGNOSIS — D50.8 OTHER IRON DEFICIENCY ANEMIA: ICD-10-CM

## 2023-03-15 DIAGNOSIS — E11.9 DIET-CONTROLLED DIABETES MELLITUS (HCC): ICD-10-CM

## 2023-03-15 DIAGNOSIS — E53.8 VITAMIN B 12 DEFICIENCY: ICD-10-CM

## 2023-03-15 LAB
ALBUMIN SERPL-MCNC: 4 G/DL (ref 3.5–5.2)
ALP SERPL-CCNC: 65 U/L (ref 35–104)
ALT SERPL-CCNC: 21 U/L (ref 0–32)
ANION GAP SERPL CALCULATED.3IONS-SCNC: 14 MMOL/L (ref 7–16)
AST SERPL-CCNC: 27 U/L (ref 0–31)
BACTERIA URNS QL MICRO: ABNORMAL /HPF
BASOPHILS # BLD: 0.06 E9/L (ref 0–0.2)
BASOPHILS NFR BLD: 1 % (ref 0–2)
BILIRUB SERPL-MCNC: 0.5 MG/DL (ref 0–1.2)
BILIRUB UR QL STRIP: NEGATIVE
BUN SERPL-MCNC: 25 MG/DL (ref 6–23)
CALCIUM SERPL-MCNC: 9.2 MG/DL (ref 8.6–10.2)
CHLORIDE SERPL-SCNC: 108 MMOL/L (ref 98–107)
CHOLESTEROL, TOTAL: 176 MG/DL (ref 0–199)
CLARITY UR: CLEAR
CO2 SERPL-SCNC: 21 MMOL/L (ref 22–29)
COLOR UR: YELLOW
CREAT SERPL-MCNC: 0.9 MG/DL (ref 0.5–1)
EOSINOPHIL # BLD: 0.59 E9/L (ref 0.05–0.5)
EOSINOPHIL NFR BLD: 9.4 % (ref 0–6)
ERYTHROCYTE [DISTWIDTH] IN BLOOD BY AUTOMATED COUNT: 13.2 FL (ref 11.5–15)
GLUCOSE SERPL-MCNC: 94 MG/DL (ref 74–99)
GLUCOSE UR STRIP-MCNC: NEGATIVE MG/DL
HBA1C MFR BLD: 5.1 % (ref 4–5.6)
HCT VFR BLD AUTO: 42.4 % (ref 34–48)
HDLC SERPL-MCNC: 53 MG/DL
HGB BLD-MCNC: 13.7 G/DL (ref 11.5–15.5)
HGB UR QL STRIP: NEGATIVE
IMM GRANULOCYTES # BLD: 0.01 E9/L
IMM GRANULOCYTES NFR BLD: 0.2 % (ref 0–5)
IRON SERPL-MCNC: 139 MCG/DL (ref 37–145)
KETONES UR STRIP-MCNC: NEGATIVE MG/DL
LDLC SERPL CALC-MCNC: 101 MG/DL (ref 0–99)
LEUKOCYTE ESTERASE UR QL STRIP: ABNORMAL
LYMPHOCYTES # BLD: 1.33 E9/L (ref 1.5–4)
LYMPHOCYTES NFR BLD: 21.2 % (ref 20–42)
MCH RBC QN AUTO: 32 PG (ref 26–35)
MCHC RBC AUTO-ENTMCNC: 32.3 % (ref 32–34.5)
MCV RBC AUTO: 99.1 FL (ref 80–99.9)
MONOCYTES # BLD: 0.62 E9/L (ref 0.1–0.95)
MONOCYTES NFR BLD: 9.9 % (ref 2–12)
NEUTROPHILS # BLD: 3.66 E9/L (ref 1.8–7.3)
NEUTS SEG NFR BLD: 58.3 % (ref 43–80)
NITRITE UR QL STRIP: NEGATIVE
PH UR STRIP: 6 [PH] (ref 5–9)
PLATELET # BLD AUTO: 191 E9/L (ref 130–450)
PMV BLD AUTO: 10 FL (ref 7–12)
POTASSIUM SERPL-SCNC: 4.9 MMOL/L (ref 3.5–5)
PROT SERPL-MCNC: 6.3 G/DL (ref 6.4–8.3)
PROT UR STRIP-MCNC: NEGATIVE MG/DL
RBC # BLD AUTO: 4.28 E12/L (ref 3.5–5.5)
RBC #/AREA URNS HPF: ABNORMAL /HPF (ref 0–2)
RENAL EPITHELIAL, UA: ABNORMAL /HPF
SODIUM SERPL-SCNC: 143 MMOL/L (ref 132–146)
SP GR UR STRIP: 1.02 (ref 1–1.03)
T4 SERPL-MCNC: 7.7 MCG/DL (ref 4.5–11.7)
TRIGL SERPL-MCNC: 110 MG/DL (ref 0–149)
TSH SERPL-MCNC: 0.02 UIU/ML (ref 0.27–4.2)
UROBILINOGEN UR STRIP-ACNC: 0.2 E.U./DL
VIT B12 SERPL-MCNC: 685 PG/ML (ref 211–946)
VITAMIN D 25-HYDROXY: 56 NG/ML (ref 30–100)
VLDLC SERPL CALC-MCNC: 22 MG/DL
WBC # BLD: 6.3 E9/L (ref 4.5–11.5)
WBC #/AREA URNS HPF: ABNORMAL /HPF (ref 0–5)

## 2023-03-22 ENCOUNTER — HOSPITAL ENCOUNTER (OUTPATIENT)
Dept: INFUSION THERAPY | Age: 78
Setting detail: INFUSION SERIES
Discharge: HOME | End: 2023-03-22
Payer: MEDICARE

## 2023-03-22 VITALS
BODY MASS INDEX: 26.81 KG/M2 | HEART RATE: 57 BPM | TEMPERATURE: 97.6 F | SYSTOLIC BLOOD PRESSURE: 153 MMHG | DIASTOLIC BLOOD PRESSURE: 74 MMHG | RESPIRATION RATE: 16 BRPM | HEIGHT: 61 IN | OXYGEN SATURATION: 100 % | WEIGHT: 142 LBS

## 2023-03-22 DIAGNOSIS — M85.89 OSTEOPENIA OF MULTIPLE SITES: Primary | ICD-10-CM

## 2023-03-22 DIAGNOSIS — M81.0 AGE-RELATED OSTEOPOROSIS WITHOUT CURRENT PATHOLOGICAL FRACTURE: ICD-10-CM

## 2023-03-22 PROCEDURE — 96361 HYDRATE IV INFUSION ADD-ON: CPT

## 2023-03-22 PROCEDURE — 96365 THER/PROPH/DIAG IV INF INIT: CPT

## 2023-03-22 PROCEDURE — 6360000002 HC RX W HCPCS: Performed by: INTERNAL MEDICINE

## 2023-03-22 PROCEDURE — 2580000003 HC RX 258: Performed by: INTERNAL MEDICINE

## 2023-03-22 RX ORDER — SODIUM CHLORIDE 9 MG/ML
INJECTION, SOLUTION INTRAVENOUS CONTINUOUS
OUTPATIENT
Start: 2023-03-22

## 2023-03-22 RX ORDER — ZOLEDRONIC ACID 5 MG/100ML
5 INJECTION, SOLUTION INTRAVENOUS ONCE
Status: CANCELLED | OUTPATIENT
Start: 2023-03-22 | End: 2023-03-22

## 2023-03-22 RX ORDER — DIPHENHYDRAMINE HYDROCHLORIDE 50 MG/ML
50 INJECTION INTRAMUSCULAR; INTRAVENOUS
OUTPATIENT
Start: 2023-03-22

## 2023-03-22 RX ORDER — EPINEPHRINE 1 MG/ML
0.3 INJECTION, SOLUTION, CONCENTRATE INTRAVENOUS PRN
OUTPATIENT
Start: 2023-03-22

## 2023-03-22 RX ORDER — ZOLEDRONIC ACID 5 MG/100ML
5 INJECTION, SOLUTION INTRAVENOUS ONCE
Status: COMPLETED | OUTPATIENT
Start: 2023-03-22 | End: 2023-03-22

## 2023-03-22 RX ORDER — SODIUM CHLORIDE 9 MG/ML
5-250 INJECTION, SOLUTION INTRAVENOUS PRN
OUTPATIENT
Start: 2023-03-22

## 2023-03-22 RX ORDER — 0.9 % SODIUM CHLORIDE 0.9 %
250 INTRAVENOUS SOLUTION INTRAVENOUS ONCE
Status: COMPLETED | OUTPATIENT
Start: 2023-03-22 | End: 2023-03-22

## 2023-03-22 RX ORDER — HEPARIN SODIUM (PORCINE) LOCK FLUSH IV SOLN 100 UNIT/ML 100 UNIT/ML
500 SOLUTION INTRAVENOUS PRN
OUTPATIENT
Start: 2023-03-22

## 2023-03-22 RX ORDER — ONDANSETRON 2 MG/ML
8 INJECTION INTRAMUSCULAR; INTRAVENOUS
OUTPATIENT
Start: 2023-03-22

## 2023-03-22 RX ORDER — ACETAMINOPHEN 325 MG/1
650 TABLET ORAL
OUTPATIENT
Start: 2023-03-22

## 2023-03-22 RX ORDER — SODIUM CHLORIDE 0.9 % (FLUSH) 0.9 %
5-40 SYRINGE (ML) INJECTION PRN
Status: DISCONTINUED | OUTPATIENT
Start: 2023-03-22 | End: 2023-03-23 | Stop reason: HOSPADM

## 2023-03-22 RX ORDER — ALBUTEROL SULFATE 90 UG/1
4 AEROSOL, METERED RESPIRATORY (INHALATION) PRN
OUTPATIENT
Start: 2023-03-22

## 2023-03-22 RX ORDER — 0.9 % SODIUM CHLORIDE 0.9 %
250 INTRAVENOUS SOLUTION INTRAVENOUS ONCE
Status: CANCELLED | OUTPATIENT
Start: 2023-03-22 | End: 2023-03-22

## 2023-03-22 RX ORDER — SODIUM CHLORIDE 9 MG/ML
INJECTION, SOLUTION INTRAVENOUS ONCE
Status: CANCELLED | OUTPATIENT
Start: 2023-03-22 | End: 2023-03-22

## 2023-03-22 RX ORDER — SODIUM CHLORIDE 0.9 % (FLUSH) 0.9 %
5-40 SYRINGE (ML) INJECTION PRN
OUTPATIENT
Start: 2023-03-22

## 2023-03-22 RX ORDER — SODIUM CHLORIDE 9 MG/ML
INJECTION, SOLUTION INTRAVENOUS ONCE
Status: COMPLETED | OUTPATIENT
Start: 2023-03-22 | End: 2023-03-22

## 2023-03-22 RX ADMIN — ZOLEDRONIC ACID 5 MG: 0.05 INJECTION, SOLUTION INTRAVENOUS at 11:04

## 2023-03-22 RX ADMIN — SODIUM CHLORIDE 250 ML: 9 INJECTION, SOLUTION INTRAVENOUS at 11:26

## 2023-03-22 RX ADMIN — SODIUM CHLORIDE, PRESERVATIVE FREE 10 ML: 5 INJECTION INTRAVENOUS at 10:47

## 2023-03-22 RX ADMIN — SODIUM CHLORIDE: 9 INJECTION, SOLUTION INTRAVENOUS at 10:50

## 2023-03-22 RX ADMIN — SODIUM CHLORIDE, PRESERVATIVE FREE 10 ML: 5 INJECTION INTRAVENOUS at 11:25

## 2023-03-22 NOTE — PROGRESS NOTES
Before infusion patient declined any infections, open wounds, or change in medical condition. Tolerated reclast infusion well. Reviewed therapy plan, offered education material and/or discharge material, reviewed medication information and signs and symptoms  and educated on possible side effects, verbalizes good knowledge of current plan patient verbalizes understanding, and has no signs or symptoms to report at this time. Patient discharged. Patient alert and oriented x3. No distress noted. Vital signs stable. Patient denies any new or worsening pain. Patient denies any needs. All questions answered. Next appointment scheduled. Declines copy of AVS. Instructed on importance, patient stayed 30 min observation after infusion completed.

## 2023-04-05 ENCOUNTER — OFFICE VISIT (OUTPATIENT)
Dept: PRIMARY CARE CLINIC | Age: 78
End: 2023-04-05
Payer: MEDICARE

## 2023-04-05 VITALS
DIASTOLIC BLOOD PRESSURE: 82 MMHG | TEMPERATURE: 97.9 F | HEIGHT: 61 IN | SYSTOLIC BLOOD PRESSURE: 134 MMHG | WEIGHT: 143 LBS | BODY MASS INDEX: 27 KG/M2

## 2023-04-05 DIAGNOSIS — E11.9 DIET-CONTROLLED DIABETES MELLITUS (HCC): ICD-10-CM

## 2023-04-05 DIAGNOSIS — M81.0 AGE-RELATED OSTEOPOROSIS WITHOUT CURRENT PATHOLOGICAL FRACTURE: ICD-10-CM

## 2023-04-05 DIAGNOSIS — M35.09 SJOGREN'S SYNDROME WITH OTHER ORGAN INVOLVEMENT (HCC): ICD-10-CM

## 2023-04-05 DIAGNOSIS — E53.8 VITAMIN B 12 DEFICIENCY: ICD-10-CM

## 2023-04-05 DIAGNOSIS — R41.3 MEMORY IMPAIRMENT: ICD-10-CM

## 2023-04-05 DIAGNOSIS — E03.9 ACQUIRED HYPOTHYROIDISM: Chronic | ICD-10-CM

## 2023-04-05 DIAGNOSIS — D50.8 OTHER IRON DEFICIENCY ANEMIA: ICD-10-CM

## 2023-04-05 DIAGNOSIS — I10 ESSENTIAL HYPERTENSION: Primary | Chronic | ICD-10-CM

## 2023-04-05 PROCEDURE — 3044F HG A1C LEVEL LT 7.0%: CPT | Performed by: FAMILY MEDICINE

## 2023-04-05 PROCEDURE — 99214 OFFICE O/P EST MOD 30 MIN: CPT | Performed by: FAMILY MEDICINE

## 2023-04-05 PROCEDURE — 1123F ACP DISCUSS/DSCN MKR DOCD: CPT | Performed by: FAMILY MEDICINE

## 2023-04-05 PROCEDURE — 3075F SYST BP GE 130 - 139MM HG: CPT | Performed by: FAMILY MEDICINE

## 2023-04-05 PROCEDURE — 3079F DIAST BP 80-89 MM HG: CPT | Performed by: FAMILY MEDICINE

## 2023-04-05 ASSESSMENT — PATIENT HEALTH QUESTIONNAIRE - PHQ9
SUM OF ALL RESPONSES TO PHQ9 QUESTIONS 1 & 2: 0
SUM OF ALL RESPONSES TO PHQ QUESTIONS 1-9: 0
2. FEELING DOWN, DEPRESSED OR HOPELESS: 0
SUM OF ALL RESPONSES TO PHQ QUESTIONS 1-9: 0
1. LITTLE INTEREST OR PLEASURE IN DOING THINGS: 0
SUM OF ALL RESPONSES TO PHQ QUESTIONS 1-9: 0
SUM OF ALL RESPONSES TO PHQ QUESTIONS 1-9: 0

## 2023-04-05 ASSESSMENT — ENCOUNTER SYMPTOMS
ALLERGIC/IMMUNOLOGIC NEGATIVE: 1
BACK PAIN: 1
EYES NEGATIVE: 1
RESPIRATORY NEGATIVE: 1
GASTROINTESTINAL NEGATIVE: 1

## 2023-04-05 NOTE — PROGRESS NOTES
23  Name: Maritza Solis    : 1945    Sex: female    Age: 69 y.o. Subjective:  Chief Complaint: Patient is here for 4 mo ck  re    thrypid  a rth  sjorgens   trmoer  back   neck     neuroapthy     sinus  hips      Back and hisp  worse  she started taking one advil a day  Saw rheum and likes    due   July      started  reclast  Back pain--offer back to  [place] but she wants to wait      Review of Systems   Constitutional: Negative. HENT: Negative. Eyes: Negative. Respiratory: Negative. Cardiovascular: Negative. Gastrointestinal: Negative. Endocrine: Negative. Genitourinary: Negative. Musculoskeletal:  Positive for arthralgias and back pain. Skin: Negative. Allergic/Immunologic: Negative. Neurological: Negative. Hematological: Negative. Psychiatric/Behavioral: Negative. Current Outpatient Medications:     metoprolol tartrate (LOPRESSOR) 50 MG tablet, Take 0.5 tablets by mouth 2 times daily, Disp: 90 tablet, Rfl: 3    Ibuprofen (ADVIL PO), Take by mouth as needed, Disp: , Rfl:     hydroxychloroquine (PLAQUENIL) 200 MG tablet, Take 1.5 tablets by mouth daily, Disp: 135 tablet, Rfl: 1    cevimeline (EVOXAC) 30 MG capsule, Take 1 capsule by mouth 3 times daily, Disp: 90 capsule, Rfl: 3    fluticasone (FLONASE) 50 MCG/ACT nasal spray, 2 sprays by Nasal route every morning (before breakfast), Disp: 16 g, Rfl: 12    levothyroxine (SYNTHROID) 112 MCG tablet, Take 1 tablet by mouth Daily, Disp: 90 tablet, Rfl: 5    gabapentin (NEURONTIN) 100 MG capsule, Take 2 capsules by mouth 3 times daily for 360 days. , Disp: 540 capsule, Rfl: 3    ferrous sulfate (IRON 325) 325 (65 Fe) MG tablet, Take 1 tablet by mouth 3 times daily, Disp: 60 tablet, Rfl: 1    Alpha-D-Galactosidase (BEANO PO), Take by mouth as needed , Disp: , Rfl:     Acetaminophen (TYLENOL ARTHRITIS PAIN PO), Take 650 mg by mouth 3 times daily 2 tabs, Disp: , Rfl:     Biotin 5 MG CAPS, Take by

## 2023-04-21 ENCOUNTER — OFFICE VISIT (OUTPATIENT)
Dept: PRIMARY CARE CLINIC | Age: 78
End: 2023-04-21

## 2023-04-21 VITALS
TEMPERATURE: 96.9 F | WEIGHT: 143.6 LBS | HEIGHT: 61 IN | BODY MASS INDEX: 27.11 KG/M2 | SYSTOLIC BLOOD PRESSURE: 134 MMHG | DIASTOLIC BLOOD PRESSURE: 82 MMHG

## 2023-04-21 DIAGNOSIS — K11.8 SUBMANDIBULAR GLAND MASS: ICD-10-CM

## 2023-04-21 DIAGNOSIS — K12.1 MOUTH ULCER: ICD-10-CM

## 2023-04-21 DIAGNOSIS — R22.1 MASS OF LEFT SIDE OF NECK: Primary | ICD-10-CM

## 2023-04-21 DIAGNOSIS — L02.11 ABSCESS, NECK: ICD-10-CM

## 2023-04-21 DIAGNOSIS — M35.09 SJOGREN'S SYNDROME WITH OTHER ORGAN INVOLVEMENT (HCC): ICD-10-CM

## 2023-04-21 RX ORDER — DOXYCYCLINE HYCLATE 100 MG
100 TABLET ORAL 2 TIMES DAILY
Qty: 20 TABLET | Refills: 0 | Status: SHIPPED | OUTPATIENT
Start: 2023-04-21 | End: 2023-05-01

## 2023-04-21 RX ORDER — CEFTRIAXONE 1 G/1
1000 INJECTION, POWDER, FOR SOLUTION INTRAMUSCULAR; INTRAVENOUS ONCE
Status: COMPLETED | OUTPATIENT
Start: 2023-04-21 | End: 2023-04-21

## 2023-04-21 RX ORDER — CEPHALEXIN 500 MG/1
500 CAPSULE ORAL 3 TIMES DAILY
Qty: 21 CAPSULE | Refills: 0 | Status: SHIPPED | OUTPATIENT
Start: 2023-04-21

## 2023-04-21 RX ORDER — LIDOCAINE HYDROCHLORIDE 10 MG/ML
1 INJECTION, SOLUTION INFILTRATION; PERINEURAL ONCE
Status: COMPLETED | OUTPATIENT
Start: 2023-04-21 | End: 2023-04-21

## 2023-04-21 RX ADMIN — CEFTRIAXONE 1000 MG: 1 INJECTION, POWDER, FOR SOLUTION INTRAMUSCULAR; INTRAVENOUS at 13:59

## 2023-04-21 RX ADMIN — LIDOCAINE HYDROCHLORIDE 1 ML: 10 INJECTION, SOLUTION INFILTRATION; PERINEURAL at 14:00

## 2023-04-21 ASSESSMENT — ENCOUNTER SYMPTOMS
EYES NEGATIVE: 1
RESPIRATORY NEGATIVE: 1
ALLERGIC/IMMUNOLOGIC NEGATIVE: 1
GASTROINTESTINAL NEGATIVE: 1

## 2023-04-21 NOTE — PROGRESS NOTES
chart before entering the room with patient and finishing charting after leaving patient's room. More than half of that time was spent face to face with the patient in counseling and coordinating care. I informed patient about the risk associated with noncompliance of medication and taking medications incorrectly. Appropriate follow-up with myself and all specialist.  Encourage family members to take active role in assisting with medications and medical care. If any confusion should develop to notify my office immediately to avoid risk of worsening medical condition      Follow Up: Return for monday.      Seen by:  Charly Burciaga, DO

## 2023-04-24 ENCOUNTER — OFFICE VISIT (OUTPATIENT)
Dept: PRIMARY CARE CLINIC | Age: 78
End: 2023-04-24
Payer: MEDICARE

## 2023-04-24 VITALS — TEMPERATURE: 97.7 F | WEIGHT: 145 LBS | HEIGHT: 61 IN | BODY MASS INDEX: 27.38 KG/M2

## 2023-04-24 DIAGNOSIS — K11.8 SUBMANDIBULAR GLAND MASS: ICD-10-CM

## 2023-04-24 DIAGNOSIS — K12.1 MOUTH ULCER: Primary | ICD-10-CM

## 2023-04-24 PROCEDURE — 99213 OFFICE O/P EST LOW 20 MIN: CPT | Performed by: FAMILY MEDICINE

## 2023-04-24 PROCEDURE — 1123F ACP DISCUSS/DSCN MKR DOCD: CPT | Performed by: FAMILY MEDICINE

## 2023-04-24 ASSESSMENT — ENCOUNTER SYMPTOMS
RESPIRATORY NEGATIVE: 1
GASTROINTESTINAL NEGATIVE: 1
EYES NEGATIVE: 1
ALLERGIC/IMMUNOLOGIC NEGATIVE: 1
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
## 2023-05-15 DIAGNOSIS — M35.09 SJOGREN'S SYNDROME WITH OTHER ORGAN INVOLVEMENT (HCC): Primary | ICD-10-CM

## 2023-05-15 RX ORDER — CEVIMELINE HYDROCHLORIDE 30 MG/1
CAPSULE ORAL
Qty: 90 CAPSULE | Refills: 5 | Status: SHIPPED | OUTPATIENT
Start: 2023-05-15

## 2023-06-30 DIAGNOSIS — M35.09 SJOGREN'S SYNDROME WITH OTHER ORGAN INVOLVEMENT (HCC): ICD-10-CM

## 2023-06-30 RX ORDER — HYDROXYCHLOROQUINE SULFATE 200 MG/1
TABLET, FILM COATED ORAL
Qty: 135 TABLET | Refills: 3 | Status: SHIPPED
Start: 2023-06-30 | End: 2023-07-16 | Stop reason: SDUPTHER

## 2023-06-30 RX ORDER — BUPROPION HYDROCHLORIDE 300 MG/1
300 TABLET ORAL EVERY MORNING
Qty: 90 TABLET | Refills: 3 | Status: SHIPPED | OUTPATIENT
Start: 2023-06-30

## 2023-07-05 ENCOUNTER — INITIAL CONSULT (OUTPATIENT)
Dept: GERIATRIC MEDICINE | Age: 78
End: 2023-07-05
Payer: MEDICARE

## 2023-07-05 VITALS
TEMPERATURE: 98.3 F | WEIGHT: 143.6 LBS | DIASTOLIC BLOOD PRESSURE: 76 MMHG | SYSTOLIC BLOOD PRESSURE: 142 MMHG | BODY MASS INDEX: 27.13 KG/M2 | HEART RATE: 58 BPM | RESPIRATION RATE: 16 BRPM

## 2023-07-05 DIAGNOSIS — G31.84 MCI (MILD COGNITIVE IMPAIRMENT): Primary | ICD-10-CM

## 2023-07-05 PROCEDURE — 3074F SYST BP LT 130 MM HG: CPT | Performed by: INTERNAL MEDICINE

## 2023-07-05 PROCEDURE — 3078F DIAST BP <80 MM HG: CPT | Performed by: INTERNAL MEDICINE

## 2023-07-05 PROCEDURE — 1123F ACP DISCUSS/DSCN MKR DOCD: CPT | Performed by: INTERNAL MEDICINE

## 2023-07-05 PROCEDURE — 99212 OFFICE O/P EST SF 10 MIN: CPT | Performed by: INTERNAL MEDICINE

## 2023-07-05 RX ORDER — ANTIOX #8/OM3/DHA/EPA/LUT/ZEAX 250-2.5 MG
1 CAPSULE ORAL 2 TIMES DAILY
COMMUNITY

## 2023-07-05 RX ORDER — GLUCOSAMINE/D3/BOSWELLIA SERRA 1500MG-400
1 TABLET ORAL 2 TIMES DAILY
COMMUNITY

## 2023-07-12 ENCOUNTER — OFFICE VISIT (OUTPATIENT)
Dept: RHEUMATOLOGY | Age: 78
End: 2023-07-12
Payer: MEDICARE

## 2023-07-12 VITALS — HEIGHT: 62 IN | BODY MASS INDEX: 25.76 KG/M2 | WEIGHT: 140 LBS

## 2023-07-12 DIAGNOSIS — M35.09 SJOGREN'S SYNDROME WITH OTHER ORGAN INVOLVEMENT (HCC): Primary | ICD-10-CM

## 2023-07-12 DIAGNOSIS — M35.09 SJOGREN'S SYNDROME WITH OTHER ORGAN INVOLVEMENT (HCC): ICD-10-CM

## 2023-07-12 DIAGNOSIS — M15.9 GENERALIZED OSTEOARTHRITIS: ICD-10-CM

## 2023-07-12 DIAGNOSIS — Z79.899 HIGH RISK MEDICATION USE: ICD-10-CM

## 2023-07-12 DIAGNOSIS — M81.0 AGE-RELATED OSTEOPOROSIS WITHOUT CURRENT PATHOLOGICAL FRACTURE: ICD-10-CM

## 2023-07-12 LAB
ALBUMIN SERPL-MCNC: 4.1 G/DL (ref 3.5–5.2)
ALP SERPL-CCNC: 58 U/L (ref 35–104)
ALT SERPL-CCNC: 17 U/L (ref 0–32)
ANION GAP SERPL CALCULATED.3IONS-SCNC: 12 MMOL/L (ref 7–16)
AST SERPL-CCNC: 27 U/L (ref 0–31)
BASOPHILS # BLD: 0.08 E9/L (ref 0–0.2)
BASOPHILS NFR BLD: 1.1 % (ref 0–2)
BILIRUB SERPL-MCNC: 0.4 MG/DL (ref 0–1.2)
BILIRUB UR QL STRIP: NEGATIVE
BUN SERPL-MCNC: 19 MG/DL (ref 6–23)
CALCIUM SERPL-MCNC: 9.3 MG/DL (ref 8.6–10.2)
CHLORIDE SERPL-SCNC: 108 MMOL/L (ref 98–107)
CLARITY UR: CLEAR
CO2 SERPL-SCNC: 24 MMOL/L (ref 22–29)
COLOR UR: YELLOW
CREAT SERPL-MCNC: 0.9 MG/DL (ref 0.5–1)
CRP SERPL HS-MCNC: <0.3 MG/DL (ref 0–0.4)
EOSINOPHIL # BLD: 0.59 E9/L (ref 0.05–0.5)
EOSINOPHIL NFR BLD: 8.3 % (ref 0–6)
ERYTHROCYTE [DISTWIDTH] IN BLOOD BY AUTOMATED COUNT: 13.1 FL (ref 11.5–15)
ERYTHROCYTE [SEDIMENTATION RATE] IN BLOOD BY WESTERGREN METHOD: 4 MM/HR (ref 0–20)
GLUCOSE SERPL-MCNC: 81 MG/DL (ref 74–99)
GLUCOSE UR STRIP-MCNC: NEGATIVE MG/DL
HCT VFR BLD AUTO: 44.5 % (ref 34–48)
HGB BLD-MCNC: 13.9 G/DL (ref 11.5–15.5)
HGB UR QL STRIP: NEGATIVE
IMM GRANULOCYTES # BLD: 0.02 E9/L
IMM GRANULOCYTES NFR BLD: 0.3 % (ref 0–5)
KETONES UR STRIP-MCNC: NEGATIVE MG/DL
LEUKOCYTE ESTERASE UR QL STRIP: NEGATIVE
LYMPHOCYTES # BLD: 1.16 E9/L (ref 1.5–4)
LYMPHOCYTES NFR BLD: 16.4 % (ref 20–42)
MCH RBC QN AUTO: 32.2 PG (ref 26–35)
MCHC RBC AUTO-ENTMCNC: 31.2 % (ref 32–34.5)
MCV RBC AUTO: 103 FL (ref 80–99.9)
MONOCYTES # BLD: 0.74 E9/L (ref 0.1–0.95)
MONOCYTES NFR BLD: 10.5 % (ref 2–12)
NEUTROPHILS # BLD: 4.49 E9/L (ref 1.8–7.3)
NEUTS SEG NFR BLD: 63.4 % (ref 43–80)
NITRITE UR QL STRIP: NEGATIVE
PH UR STRIP: 5.5 [PH] (ref 5–9)
PLATELET # BLD AUTO: 192 E9/L (ref 130–450)
PMV BLD AUTO: 10.3 FL (ref 7–12)
POTASSIUM SERPL-SCNC: 5 MMOL/L (ref 3.5–5)
PROT SERPL-MCNC: 6.2 G/DL (ref 6.4–8.3)
PROT UR STRIP-MCNC: NEGATIVE MG/DL
RBC # BLD AUTO: 4.32 E12/L (ref 3.5–5.5)
RHEUMATOID FACT SER NEPH-ACNC: <10 IU/ML (ref 0–13)
SODIUM SERPL-SCNC: 144 MMOL/L (ref 132–146)
SP GR UR STRIP: >=1.03 (ref 1–1.03)
UROBILINOGEN UR STRIP-ACNC: 0.2 E.U./DL
WBC # BLD: 7.1 E9/L (ref 4.5–11.5)

## 2023-07-12 PROCEDURE — 99214 OFFICE O/P EST MOD 30 MIN: CPT | Performed by: INTERNAL MEDICINE

## 2023-07-12 PROCEDURE — 1123F ACP DISCUSS/DSCN MKR DOCD: CPT | Performed by: INTERNAL MEDICINE

## 2023-07-12 ASSESSMENT — ENCOUNTER SYMPTOMS
VOMITING: 0
DIARRHEA: 0
SHORTNESS OF BREATH: 0
COUGH: 0
COLOR CHANGE: 0
NAUSEA: 0
TROUBLE SWALLOWING: 0
ABDOMINAL PAIN: 0

## 2023-07-12 NOTE — PROGRESS NOTES
Eddie Chi 1945 is a 69 y.o. female, here for evaluation of the following chief complaint(s):  Follow-up (Patient here for follow up on Sjogrens. )         ASSESSMENT/PLAN:    Kristin Dobbins 1945 is a 69 y.o. female seen in follow-up for Sjogren's syndrome. 1.  Sjogren's syndrome-manifest as positive SSA and SSB, sicca symptoms, mild inflammatory arthritis. Doing well on Plaquenil 300 mg daily. As far as sicca symptoms her dry eyes are manageable with artificial tears and Systane. As far as dry mouth she has found cevimeline to be more beneficial than pilocarpine. She did have some submental swelling in April which was likely more infectious as it did respond to doxycycline. She has not had any issues since. See no other signs of extraglandular manifestations. I would not make any changes. We will update blood work as below. 2.  High risk medication use-she follows with the eye doctor regularly while on Plaquenil. 3.  Osteoarthritis-she can continue Tylenol and Advil on a as needed basis. 4.  Osteopenia-with high FRAX on bone density scan from February 2023. She got her first dose of Reclast in March. She is on vitamin D supplementation. 1. Sjogren's syndrome with other organ involvement ([address])  -     CBC with Auto Differential; Future  -     Comprehensive Metabolic Panel; Future  -     Rheumatoid Factor; Future  -     Urinalysis; Future  -     Sedimentation Rate; Future  -     C-Reactive Protein; Future  -     Electrophoresis Protein, Serum; Future  2. High risk medication use  3. Generalized osteoarthritis  4. Age-related osteoporosis without current pathological fracture        Return in about 7 months (around 2/12/2024). Subjective   SUBJECTIVE/OBJECTIVE:    HPI: Kristin Dobbins 1945 is a 69 y.o. female seen in follow-up for Sjogren's syndrome. Doing well for the most part. She does have some fatigue. She has some arthralgias without joint swelling.

## 2023-07-14 LAB
ALBUMIN SERPL-MCNC: 3.2 G/DL (ref 3.5–4.7)
ALPHA1 GLOB SERPL ELPH-MCNC: 0.3 G/DL (ref 0.2–0.4)
ALPHA2 GLOB SERPL ELPH-MCNC: 0.8 G/DL (ref 0.5–1)
B-GLOBULIN SERPL ELPH-MCNC: 0.9 G/DL (ref 0.8–1.3)
ELECTROPHORESIS: ABNORMAL
GAMMA GLOB SERPL ELPH-MCNC: 0.8 G/DL (ref 0.7–1.6)

## 2023-07-16 DIAGNOSIS — M35.09 SJOGREN'S SYNDROME WITH OTHER ORGAN INVOLVEMENT (HCC): ICD-10-CM

## 2023-07-17 ENCOUNTER — OFFICE VISIT (OUTPATIENT)
Dept: FAMILY MEDICINE CLINIC | Age: 78
End: 2023-07-17

## 2023-07-17 VITALS
WEIGHT: 141 LBS | BODY MASS INDEX: 25.95 KG/M2 | TEMPERATURE: 97 F | HEIGHT: 62 IN | OXYGEN SATURATION: 97 % | DIASTOLIC BLOOD PRESSURE: 68 MMHG | SYSTOLIC BLOOD PRESSURE: 132 MMHG | RESPIRATION RATE: 20 BRPM | HEART RATE: 68 BPM
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
## 2023-07-17 DIAGNOSIS — M25.552 LEFT HIP PAIN: ICD-10-CM

## 2023-07-17 RX ORDER — METHYLPREDNISOLONE ACETATE 40 MG/ML
40 INJECTION, SUSPENSION INTRA-ARTICULAR; INTRALESIONAL; INTRAMUSCULAR; SOFT TISSUE ONCE
Status: COMPLETED | OUTPATIENT
Start: 2023-07-17 | End: 2023-07-17

## 2023-07-17 RX ORDER — HYDROXYCHLOROQUINE SULFATE 200 MG/1
300 TABLET, FILM COATED ORAL DAILY
Qty: 135 TABLET | Refills: 3 | Status: SHIPPED | OUTPATIENT
Start: 2023-07-17

## 2023-07-17 RX ORDER — METHYLPREDNISOLONE 4 MG/1
TABLET ORAL
Qty: 1 KIT | Refills: 0 | Status: SHIPPED | OUTPATIENT
Start: 2023-07-17

## 2023-07-17 RX ADMIN — METHYLPREDNISOLONE ACETATE 40 MG: 40 INJECTION, SUSPENSION INTRA-ARTICULAR; INTRALESIONAL; INTRAMUSCULAR; SOFT TISSUE at 09:37

## 2023-07-17 NOTE — PROGRESS NOTES
23  Kristin Dobbins : 1945 Sex: female  Age 69 y.o. Subjective:  Chief Complaint   Patient presents with    Hip Pain     Left         HPI:   Kristin Dobbins , 69 y.o. female presents to express care for evaluation of left hip pain    HPI  69-year-old female presents to express care for evaluation of left hip pain. The patient started with the symptoms on Friday. The patient states that this is pretty classic of her hip pain and low back pain. Occasionally will have sciatica. Does radiate down the left leg. The patient states that this is more to the lateral aspect of the hip and she did fall yesterday. The patient states that the pain started on Friday but the fall was yesterday. The patient is not having any bladder or bowel incontinence, she does have some stress incontinence. The patient does not have any fevers, chills. No dysuria, hematuria. No abdominal pain. ROS:   Unless otherwise stated in this report the patient's positive and negative responses for review of systems for constitutional, eyes, ENT, cardiovascular, respiratory, gastrointestinal, neurological, , musculoskeletal, and integument systems and related systems to the presenting problem are either stated in the history of present illness or were not pertinent or were negative for the symptoms and/or complaints related to the presenting medical problem. Positives and pertinent negatives as per HPI. All others reviewed and are negative.       PMH:     Past Medical History:   Diagnosis Date    ANS (arteriolar nephrosclerosis)     Anxiety     Arrhythmia     Atrial Fibrillation , Reentrant AV elfego tachycardia, radiofrequency ablation 1999 Jonnathan Mann  / follow with Dr. Dale Sanders every year    Atrial fibrillation [facility])     AVNRT (AV elfego re-entry tachycardia) ([address]) ?    s/p ablation /resolved; noted 18- had recent fast heart rate & hypertensoion & seeing Dr. Judge Sanders  18    Benign

## 2023-09-15 ENCOUNTER — TELEPHONE (OUTPATIENT)
Dept: PRIMARY CARE CLINIC | Age: 78
End: 2023-09-15

## 2023-09-15 DIAGNOSIS — F41.9 ANXIETY: Primary | ICD-10-CM

## 2023-09-18 NOTE — TELEPHONE ENCOUNTER
Notify  referal done   if they will nmot accept medicare                    can call around and she who does

## 2023-10-01 DIAGNOSIS — J30.2 SEASONAL ALLERGIC RHINITIS, UNSPECIFIED TRIGGER: ICD-10-CM

## 2023-10-02 ENCOUNTER — TELEPHONE (OUTPATIENT)
Dept: PRIMARY CARE CLINIC | Age: 78
End: 2023-10-02

## 2023-10-02 RX ORDER — FLUTICASONE PROPIONATE 50 MCG
2 SPRAY, SUSPENSION (ML) NASAL
Qty: 16 G | Refills: 12 | Status: SHIPPED | OUTPATIENT
Start: 2023-10-02

## 2023-10-03 RX ORDER — GABAPENTIN 100 MG/1
200 CAPSULE ORAL 3 TIMES DAILY
Qty: 540 CAPSULE | Refills: 3 | Status: SHIPPED | OUTPATIENT
Start: 2023-10-03 | End: 2024-09-27

## 2023-10-08 SDOH — ECONOMIC STABILITY: HOUSING INSECURITY
IN THE LAST 12 MONTHS, WAS THERE A TIME WHEN YOU DID NOT HAVE A STEADY PLACE TO SLEEP OR SLEPT IN A SHELTER (INCLUDING NOW)?: NO

## 2023-10-08 SDOH — ECONOMIC STABILITY: FOOD INSECURITY: WITHIN THE PAST 12 MONTHS, YOU WORRIED THAT YOUR FOOD WOULD RUN OUT BEFORE YOU GOT MONEY TO BUY MORE.: NEVER TRUE

## 2023-10-08 SDOH — ECONOMIC STABILITY: INCOME INSECURITY: HOW HARD IS IT FOR YOU TO PAY FOR THE VERY BASICS LIKE FOOD, HOUSING, MEDICAL CARE, AND HEATING?: NOT HARD AT ALL

## 2023-10-08 SDOH — ECONOMIC STABILITY: FOOD INSECURITY: WITHIN THE PAST 12 MONTHS, THE FOOD YOU BOUGHT JUST DIDN'T LAST AND YOU DIDN'T HAVE MONEY TO GET MORE.: NEVER TRUE

## 2023-10-08 SDOH — ECONOMIC STABILITY: TRANSPORTATION INSECURITY
IN THE PAST 12 MONTHS, HAS LACK OF TRANSPORTATION KEPT YOU FROM MEETINGS, WORK, OR FROM GETTING THINGS NEEDED FOR DAILY LIVING?: NO

## 2023-10-11 DIAGNOSIS — M35.09 SJOGREN'S SYNDROME WITH OTHER ORGAN INVOLVEMENT (HCC): ICD-10-CM

## 2023-10-11 DIAGNOSIS — I10 ESSENTIAL HYPERTENSION: Chronic | ICD-10-CM

## 2023-10-11 DIAGNOSIS — E11.9 DIET-CONTROLLED DIABETES MELLITUS (HCC): ICD-10-CM

## 2023-10-11 DIAGNOSIS — E53.8 VITAMIN B 12 DEFICIENCY: ICD-10-CM

## 2023-10-11 DIAGNOSIS — E03.9 ACQUIRED HYPOTHYROIDISM: Chronic | ICD-10-CM

## 2023-10-11 DIAGNOSIS — D50.8 OTHER IRON DEFICIENCY ANEMIA: ICD-10-CM

## 2023-10-11 DIAGNOSIS — M81.0 AGE-RELATED OSTEOPOROSIS WITHOUT CURRENT PATHOLOGICAL FRACTURE: ICD-10-CM

## 2023-10-11 LAB
ABSOLUTE IMMATURE GRANULOCYTE: <0.03 K/UL (ref 0–0.58)
ALBUMIN SERPL-MCNC: 4.1 G/DL (ref 3.5–5.2)
ALP BLD-CCNC: 58 U/L (ref 35–104)
ALT SERPL-CCNC: 14 U/L (ref 0–32)
ANION GAP SERPL CALCULATED.3IONS-SCNC: 14 MMOL/L (ref 7–16)
AST SERPL-CCNC: 22 U/L (ref 0–31)
BASOPHILS ABSOLUTE: 0.08 K/UL (ref 0–0.2)
BASOPHILS RELATIVE PERCENT: 1 % (ref 0–2)
BILIRUB SERPL-MCNC: 0.4 MG/DL (ref 0–1.2)
BILIRUBIN URINE: NEGATIVE
BUN BLDV-MCNC: 25 MG/DL (ref 6–23)
CALCIUM SERPL-MCNC: 9.4 MG/DL (ref 8.6–10.2)
CHLORIDE BLD-SCNC: 107 MMOL/L (ref 98–107)
CHOLESTEROL: 192 MG/DL
CO2: 20 MMOL/L (ref 22–29)
COLOR: YELLOW
COMMENT: NORMAL
CREAT SERPL-MCNC: 0.9 MG/DL (ref 0.5–1)
EOSINOPHILS ABSOLUTE: 0.5 K/UL (ref 0.05–0.5)
EOSINOPHILS RELATIVE PERCENT: 9 % (ref 0–6)
GFR SERPL CREATININE-BSD FRML MDRD: >60 ML/MIN/1.73M2
GLUCOSE BLD-MCNC: 88 MG/DL (ref 74–99)
GLUCOSE URINE: NEGATIVE MG/DL
HBA1C MFR BLD: 5.2 % (ref 4–5.6)
HCT VFR BLD CALC: 43.2 % (ref 34–48)
HDLC SERPL-MCNC: 55 MG/DL
HEMOGLOBIN: 14.2 G/DL (ref 11.5–15.5)
IMMATURE GRANULOCYTES: 0 % (ref 0–5)
IRON: 154 UG/DL (ref 37–145)
KETONES, URINE: NEGATIVE MG/DL
LDL CHOLESTEROL: 114 MG/DL
LEUKOCYTE ESTERASE, URINE: NEGATIVE
LYMPHOCYTES ABSOLUTE: 1.33 K/UL (ref 1.5–4)
LYMPHOCYTES RELATIVE PERCENT: 23 % (ref 20–42)
MCH RBC QN AUTO: 32.9 PG (ref 26–35)
MCHC RBC AUTO-ENTMCNC: 32.9 G/DL (ref 32–34.5)
MCV RBC AUTO: 100 FL (ref 80–99.9)
MONOCYTES ABSOLUTE: 0.57 K/UL (ref 0.1–0.95)
MONOCYTES RELATIVE PERCENT: 10 % (ref 2–12)
NEUTROPHILS ABSOLUTE: 3.19 K/UL (ref 1.8–7.3)
NEUTROPHILS RELATIVE PERCENT: 56 % (ref 43–80)
NITRITE, URINE: NEGATIVE
PDW BLD-RTO: 12.7 % (ref 11.5–15)
PH UA: 6 (ref 5–9)
PLATELET # BLD: 205 K/UL (ref 130–450)
PMV BLD AUTO: 10.2 FL (ref 7–12)
POTASSIUM SERPL-SCNC: 5.1 MMOL/L (ref 3.5–5)
PROTEIN UA: NEGATIVE MG/DL
RBC # BLD: 4.32 M/UL (ref 3.5–5.5)
SODIUM BLD-SCNC: 141 MMOL/L (ref 132–146)
SPECIFIC GRAVITY UA: 1.02 (ref 1–1.03)
T4 TOTAL: 8.1 UG/DL (ref 4.5–11.7)
TOTAL PROTEIN: 6.5 G/DL (ref 6.4–8.3)
TRIGL SERPL-MCNC: 113 MG/DL
TSH SERPL DL<=0.05 MIU/L-ACNC: 0.02 UIU/ML (ref 0.27–4.2)
TURBIDITY: CLEAR
URINE HGB: NEGATIVE
UROBILINOGEN, URINE: 0.2 EU/DL (ref 0–1)
VITAMIN B-12: 664 PG/ML (ref 211–946)
VITAMIN D 25-HYDROXY: 77 NG/ML (ref 30–100)
VLDLC SERPL CALC-MCNC: 23 MG/DL
WBC # BLD: 5.7 K/UL (ref 4.5–11.5)

## 2023-10-15 SDOH — ECONOMIC STABILITY: INCOME INSECURITY: HOW HARD IS IT FOR YOU TO PAY FOR THE VERY BASICS LIKE FOOD, HOUSING, MEDICAL CARE, AND HEATING?: NOT HARD AT ALL

## 2023-10-15 SDOH — ECONOMIC STABILITY: FOOD INSECURITY: WITHIN THE PAST 12 MONTHS, THE FOOD YOU BOUGHT JUST DIDN'T LAST AND YOU DIDN'T HAVE MONEY TO GET MORE.: NEVER TRUE

## 2023-10-15 SDOH — ECONOMIC STABILITY: FOOD INSECURITY: WITHIN THE PAST 12 MONTHS, YOU WORRIED THAT YOUR FOOD WOULD RUN OUT BEFORE YOU GOT MONEY TO BUY MORE.: NEVER TRUE

## 2023-10-18 ENCOUNTER — OFFICE VISIT (OUTPATIENT)
Dept: PRIMARY CARE CLINIC | Age: 78
End: 2023-10-18
Payer: MEDICARE

## 2023-10-18 VITALS
SYSTOLIC BLOOD PRESSURE: 128 MMHG | DIASTOLIC BLOOD PRESSURE: 78 MMHG | BODY MASS INDEX: 26.13 KG/M2 | TEMPERATURE: 98 F | WEIGHT: 142 LBS | HEIGHT: 62 IN

## 2023-10-18 DIAGNOSIS — M81.0 AGE-RELATED OSTEOPOROSIS WITHOUT CURRENT PATHOLOGICAL FRACTURE: ICD-10-CM

## 2023-10-18 DIAGNOSIS — E03.9 ACQUIRED HYPOTHYROIDISM: Chronic | ICD-10-CM

## 2023-10-18 DIAGNOSIS — E78.2 MIXED HYPERLIPIDEMIA: Chronic | ICD-10-CM

## 2023-10-18 DIAGNOSIS — E53.8 VITAMIN B 12 DEFICIENCY: ICD-10-CM

## 2023-10-18 DIAGNOSIS — Z00.00 MEDICARE ANNUAL WELLNESS VISIT, SUBSEQUENT: Primary | ICD-10-CM

## 2023-10-18 DIAGNOSIS — M35.09 SJOGREN'S SYNDROME WITH OTHER ORGAN INVOLVEMENT (HCC): ICD-10-CM

## 2023-10-18 DIAGNOSIS — I10 ESSENTIAL HYPERTENSION: Chronic | ICD-10-CM

## 2023-10-18 DIAGNOSIS — E11.9 DIET-CONTROLLED DIABETES MELLITUS (HCC): ICD-10-CM

## 2023-10-18 PROCEDURE — G0439 PPPS, SUBSEQ VISIT: HCPCS | Performed by: FAMILY MEDICINE

## 2023-10-18 PROCEDURE — 3044F HG A1C LEVEL LT 7.0%: CPT | Performed by: FAMILY MEDICINE

## 2023-10-18 PROCEDURE — 3074F SYST BP LT 130 MM HG: CPT | Performed by: FAMILY MEDICINE

## 2023-10-18 PROCEDURE — 3078F DIAST BP <80 MM HG: CPT | Performed by: FAMILY MEDICINE

## 2023-10-18 PROCEDURE — 1123F ACP DISCUSS/DSCN MKR DOCD: CPT | Performed by: FAMILY MEDICINE

## 2023-10-18 ASSESSMENT — PATIENT HEALTH QUESTIONNAIRE - PHQ9
SUM OF ALL RESPONSES TO PHQ QUESTIONS 1-9: 0
SUM OF ALL RESPONSES TO PHQ QUESTIONS 1-9: 0
SUM OF ALL RESPONSES TO PHQ9 QUESTIONS 1 & 2: 0
2. FEELING DOWN, DEPRESSED OR HOPELESS: 0
1. LITTLE INTEREST OR PLEASURE IN DOING THINGS: 0
SUM OF ALL RESPONSES TO PHQ QUESTIONS 1-9: 0
SUM OF ALL RESPONSES TO PHQ QUESTIONS 1-9: 0

## 2023-10-18 ASSESSMENT — LIFESTYLE VARIABLES: HOW MANY STANDARD DRINKS CONTAINING ALCOHOL DO YOU HAVE ON A TYPICAL DAY: PATIENT DOES NOT DRINK

## 2023-11-09 DIAGNOSIS — M35.09 SJOGREN'S SYNDROME WITH OTHER ORGAN INVOLVEMENT (HCC): ICD-10-CM

## 2023-11-09 RX ORDER — CEVIMELINE HYDROCHLORIDE 30 MG/1
30 CAPSULE ORAL 3 TIMES DAILY
Qty: 90 CAPSULE | Refills: 5 | Status: SHIPPED | OUTPATIENT
Start: 2023-11-09

## 2023-12-06 ENCOUNTER — OFFICE VISIT (OUTPATIENT)
Dept: FAMILY MEDICINE CLINIC | Age: 78
End: 2023-12-06
Payer: MEDICARE

## 2023-12-06 VITALS
DIASTOLIC BLOOD PRESSURE: 70 MMHG | BODY MASS INDEX: 25.95 KG/M2 | SYSTOLIC BLOOD PRESSURE: 132 MMHG | OXYGEN SATURATION: 96 % | HEART RATE: 66 BPM | WEIGHT: 141 LBS | TEMPERATURE: 97 F | HEIGHT: 62 IN | RESPIRATION RATE: 20 BRPM

## 2023-12-06 DIAGNOSIS — J04.0 ACUTE LARYNGITIS: ICD-10-CM

## 2023-12-06 DIAGNOSIS — J06.9 ACUTE UPPER RESPIRATORY INFECTION, UNSPECIFIED: Primary | ICD-10-CM

## 2023-12-06 DIAGNOSIS — R09.82 POSTNASAL DRIP: ICD-10-CM

## 2023-12-06 DIAGNOSIS — J01.90 ACUTE NON-RECURRENT SINUSITIS, UNSPECIFIED LOCATION: ICD-10-CM

## 2023-12-06 DIAGNOSIS — R07.0 PAIN IN THROAT: ICD-10-CM

## 2023-12-06 LAB
Lab: NORMAL
PERFORMING INSTRUMENT: NORMAL
QC PASS/FAIL: NORMAL
S PYO AG THROAT QL: NORMAL
SARS-COV-2, POC: NORMAL

## 2023-12-06 PROCEDURE — 99213 OFFICE O/P EST LOW 20 MIN: CPT | Performed by: PHYSICIAN ASSISTANT

## 2023-12-06 PROCEDURE — 1123F ACP DISCUSS/DSCN MKR DOCD: CPT | Performed by: PHYSICIAN ASSISTANT

## 2023-12-06 PROCEDURE — 87426 SARSCOV CORONAVIRUS AG IA: CPT | Performed by: PHYSICIAN ASSISTANT

## 2023-12-06 PROCEDURE — 87880 STREP A ASSAY W/OPTIC: CPT | Performed by: PHYSICIAN ASSISTANT

## 2023-12-06 PROCEDURE — 3075F SYST BP GE 130 - 139MM HG: CPT | Performed by: PHYSICIAN ASSISTANT

## 2023-12-06 PROCEDURE — 3078F DIAST BP <80 MM HG: CPT | Performed by: PHYSICIAN ASSISTANT

## 2023-12-06 RX ORDER — AMOXICILLIN 875 MG/1
875 TABLET, COATED ORAL 2 TIMES DAILY
Qty: 20 TABLET | Refills: 0 | Status: SHIPPED | OUTPATIENT
Start: 2023-12-06 | End: 2023-12-16

## 2023-12-06 RX ORDER — BENZONATATE 100 MG/1
100 CAPSULE ORAL 3 TIMES DAILY PRN
Qty: 21 CAPSULE | Refills: 0 | Status: SHIPPED | OUTPATIENT
Start: 2023-12-06 | End: 2023-12-13

## 2023-12-06 NOTE — PROGRESS NOTES
23  Karissa Spivey : 1945 Sex: female  Age 69 y.o. 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 The patient said yesterday she was having quite a bit of coughing fits and when she coughed something up she noted something in the back of her throat. The patient is Lemme chest pain, shortness of breath. The patient is not currently on any antibiotics. ROS:   Unless otherwise stated in this report the patient's positive and negative responses for review of systems for constitutional, eyes, ENT, cardiovascular, respiratory, gastrointestinal, neurological, , musculoskeletal, and integument systems and related systems to the presenting problem are either stated in the history of present illness or were not pertinent or were negative for the symptoms and/or complaints related to the presenting medical problem. Positives and pertinent negatives as per HPI. All others reviewed and are negative.       PMH:     Past Medical History:   Diagnosis Date    ANS (arteriolar nephrosclerosis)     Anxiety 2018    Arrhythmia     Atrial Fibrillation , Reentrant AV elfego tachycardia, radiofrequency ablation 1999 Rui Xie  / follow with Dr. Liao Session every year    Atrial fibrillation ([address])

## 2023-12-11 ENCOUNTER — TELEPHONE (OUTPATIENT)
Dept: PRIMARY CARE CLINIC | Age: 78
End: 2023-12-11

## 2023-12-11 ENCOUNTER — OFFICE VISIT (OUTPATIENT)
Dept: GERIATRIC MEDICINE | Age: 78
End: 2023-12-11
Payer: MEDICARE

## 2023-12-11 VITALS
DIASTOLIC BLOOD PRESSURE: 80 MMHG | HEART RATE: 62 BPM | RESPIRATION RATE: 14 BRPM | TEMPERATURE: 97.7 F | SYSTOLIC BLOOD PRESSURE: 138 MMHG | WEIGHT: 140 LBS | BODY MASS INDEX: 25.61 KG/M2

## 2023-12-11 DIAGNOSIS — G31.84 MCI (MILD COGNITIVE IMPAIRMENT): Primary | ICD-10-CM

## 2023-12-11 PROCEDURE — 1123F ACP DISCUSS/DSCN MKR DOCD: CPT | Performed by: INTERNAL MEDICINE

## 2023-12-11 PROCEDURE — 99213 OFFICE O/P EST LOW 20 MIN: CPT | Performed by: INTERNAL MEDICINE

## 2023-12-11 PROCEDURE — 3075F SYST BP GE 130 - 139MM HG: CPT | Performed by: INTERNAL MEDICINE

## 2023-12-11 PROCEDURE — 3079F DIAST BP 80-89 MM HG: CPT | Performed by: INTERNAL MEDICINE

## 2023-12-11 NOTE — TELEPHONE ENCOUNTER
Patient mixed up bottles of medication and accidentally took approx 5 Advil. Asking if she'll be ok.   Becki Garcia

## 2023-12-11 NOTE — PROGRESS NOTES
6 month follow up. Patient is here alone today.    Blood Pressure 142/80 rechecked 138/80   Dr Martha Lora notified of above
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
## 2023-12-11 NOTE — TELEPHONE ENCOUNTER
Per Dr. Dmitry Arroyo, advised patient she will be ok.   Drink plenty of water and let us know if she has any problems

## 2023-12-26 ENCOUNTER — TELEPHONE (OUTPATIENT)
Dept: PRIMARY CARE CLINIC | Age: 78
End: 2023-12-26

## 2023-12-26 NOTE — TELEPHONE ENCOUNTER
Patient states she had some Motrin 800 that she found at home from her dentist and took it. Says that when she took the Motrin, she did not have any pain that day and the next.   Cut back Gabapentin and Tylenol (now only taking 2 of each a day). Says she has less anxiety when it comes time to take her pill and her essential tremors have lessened.     Marshal Roger

## 2024-01-04 RX ORDER — IBUPROFEN 800 MG/1
800 TABLET ORAL 2 TIMES DAILY PRN
Qty: 60 TABLET | Refills: 5 | Status: SHIPPED | OUTPATIENT
Start: 2024-01-04

## 2024-01-04 NOTE — TELEPHONE ENCOUNTER
Notify pt    ibuprofen sent    but caution  can cause  elev    bp     stoamch and kidney issues    I adverse  take only when needed

## 2024-01-09 RX ORDER — LEVOTHYROXINE SODIUM 112 UG/1
112 TABLET ORAL DAILY
Qty: 90 TABLET | Refills: 5 | Status: SHIPPED | OUTPATIENT
Start: 2024-01-09

## 2024-01-17 ENCOUNTER — TELEPHONE (OUTPATIENT)
Dept: ADMINISTRATIVE | Age: 79
End: 2024-01-17

## 2024-01-17 NOTE — TELEPHONE ENCOUNTER
Pt has been changing some of her meds. She is dizzy. Also, lower body weakness.   She would like to talk to Dr Agustin.  LOV 1/2022  [phone]

## 2024-01-17 NOTE — TELEPHONE ENCOUNTER
I spoke with patient, she said she has no complaints and  wants to schedule a routine f/u to discuss medication, f/u was scheduled.

## 2024-01-19 RX ORDER — METOPROLOL TARTRATE 50 MG/1
25 TABLET, FILM COATED ORAL 2 TIMES DAILY
Qty: 90 TABLET | Refills: 3 | Status: SHIPPED | OUTPATIENT
Start: 2024-01-19

## 2024-01-19 NOTE — TELEPHONE ENCOUNTER
Patients last appointment 10/18/2023.  Patients next scheduled appointment   Future Appointments   Date Time Provider Department Center   2/14/2024 10:00 AM Papito Foss,  [facility]   3/6/2024 12:45 PM Blanca Agustin DO [place] [facility]   3/27/2024 10:30 AM [facility]  [facility]   4/24/2024  9:45 AM Олег Rain,  [facility]   6/12/2024  1:30 PM Blake Black MD [facility]

## 2024-02-02 ENCOUNTER — TELEPHONE (OUTPATIENT)
Dept: PRIMARY CARE CLINIC | Age: 79
End: 2024-02-02

## 2024-02-02 DIAGNOSIS — M54.6 CHRONIC BILATERAL THORACIC BACK PAIN: Primary | ICD-10-CM

## 2024-02-02 DIAGNOSIS — G89.29 CHRONIC BILATERAL THORACIC BACK PAIN: Primary | ICD-10-CM

## 2024-02-02 NOTE — TELEPHONE ENCOUNTER
The pt says that there is no space between her ribs and her pelvis bone and they are rubbing together and it really hurts, she is asking if she can be referred to see someone about the situation

## 2024-02-03 NOTE — TELEPHONE ENCOUNTER
Tell her I am not sure what could be done for that     I put in referral for dr goldstein-----pain and back doc       very  good   see what she can offer

## 2024-02-14 ENCOUNTER — OFFICE VISIT (OUTPATIENT)
Dept: RHEUMATOLOGY | Age: 79
End: 2024-02-14
Payer: MEDICARE

## 2024-02-14 VITALS — HEIGHT: 62 IN | WEIGHT: 136 LBS | BODY MASS INDEX: 25.03 KG/M2

## 2024-02-14 DIAGNOSIS — R59.0 CERVICAL LYMPHADENOPATHY: ICD-10-CM

## 2024-02-14 DIAGNOSIS — M35.09 SJOGREN'S SYNDROME WITH OTHER ORGAN INVOLVEMENT (HCC): Primary | Chronic | ICD-10-CM

## 2024-02-14 DIAGNOSIS — M35.09 SJOGREN'S SYNDROME WITH OTHER ORGAN INVOLVEMENT (HCC): Chronic | ICD-10-CM

## 2024-02-14 DIAGNOSIS — M15.9 GENERALIZED OSTEOARTHRITIS: ICD-10-CM

## 2024-02-14 DIAGNOSIS — Z79.899 HIGH RISK MEDICATION USE: ICD-10-CM

## 2024-02-14 DIAGNOSIS — M85.89 OSTEOPENIA OF MULTIPLE SITES: ICD-10-CM

## 2024-02-14 LAB
ABSOLUTE IMMATURE GRANULOCYTE: <0.03 K/UL (ref 0–0.58)
ALBUMIN SERPL-MCNC: 3.8 G/DL (ref 3.5–5.2)
ALP BLD-CCNC: 53 U/L (ref 35–104)
ALT SERPL-CCNC: 14 U/L (ref 0–32)
ANION GAP SERPL CALCULATED.3IONS-SCNC: 16 MMOL/L (ref 7–16)
AST SERPL-CCNC: 21 U/L (ref 0–31)
BASOPHILS ABSOLUTE: 0.08 K/UL (ref 0–0.2)
BASOPHILS RELATIVE PERCENT: 1 % (ref 0–2)
BILIRUB SERPL-MCNC: 0.3 MG/DL (ref 0–1.2)
BILIRUBIN URINE: NEGATIVE
BUN BLDV-MCNC: 24 MG/DL (ref 6–23)
C-REACTIVE PROTEIN: <3 MG/L (ref 0–5)
CALCIUM SERPL-MCNC: 8.8 MG/DL (ref 8.6–10.2)
CHLORIDE BLD-SCNC: 108 MMOL/L (ref 98–107)
CO2: 19 MMOL/L (ref 22–29)
COLOR: YELLOW
COMMENT: ABNORMAL
CREAT SERPL-MCNC: 0.9 MG/DL (ref 0.5–1)
EOSINOPHILS ABSOLUTE: 0.43 K/UL (ref 0.05–0.5)
EOSINOPHILS RELATIVE PERCENT: 7 % (ref 0–6)
GFR SERPL CREATININE-BSD FRML MDRD: >60 ML/MIN/1.73M2
GLUCOSE BLD-MCNC: 87 MG/DL (ref 74–99)
GLUCOSE URINE: NEGATIVE MG/DL
HCT VFR BLD CALC: 41.1 % (ref 34–48)
HEMOGLOBIN: 13.1 G/DL (ref 11.5–15.5)
IMMATURE GRANULOCYTES: 0 % (ref 0–5)
KETONES, URINE: NEGATIVE MG/DL
LEUKOCYTE ESTERASE, URINE: NEGATIVE
LYMPHOCYTES ABSOLUTE: 1.07 K/UL (ref 1.5–4)
LYMPHOCYTES RELATIVE PERCENT: 17 % (ref 20–42)
MCH RBC QN AUTO: 31.5 PG (ref 26–35)
MCHC RBC AUTO-ENTMCNC: 31.9 G/DL (ref 32–34.5)
MCV RBC AUTO: 98.8 FL (ref 80–99.9)
MONOCYTES ABSOLUTE: 0.66 K/UL (ref 0.1–0.95)
MONOCYTES RELATIVE PERCENT: 11 % (ref 2–12)
NEUTROPHILS ABSOLUTE: 4.03 K/UL (ref 1.8–7.3)
NEUTROPHILS RELATIVE PERCENT: 64 % (ref 43–80)
NITRITE, URINE: NEGATIVE
PDW BLD-RTO: 12.3 % (ref 11.5–15)
PH UA: 5.5 (ref 5–9)
PLATELET # BLD: 190 K/UL (ref 130–450)
PMV BLD AUTO: 11 FL (ref 7–12)
POTASSIUM SERPL-SCNC: 4.4 MMOL/L (ref 3.5–5)
PROTEIN UA: NEGATIVE MG/DL
RBC # BLD: 4.16 M/UL (ref 3.5–5.5)
RHEUMATOID FACTOR: <10 IU/ML (ref 0–13)
SEDIMENTATION RATE, ERYTHROCYTE: 5 MM/HR (ref 0–20)
SODIUM BLD-SCNC: 143 MMOL/L (ref 132–146)
SPECIFIC GRAVITY UA: >1.03 (ref 1–1.03)
TOTAL PROTEIN: 5.7 G/DL (ref 6.4–8.3)
TURBIDITY: CLEAR
URINE HGB: NEGATIVE
UROBILINOGEN, URINE: 0.2 EU/DL (ref 0–1)
WBC # BLD: 6.3 K/UL (ref 4.5–11.5)

## 2024-02-14 PROCEDURE — G2211 COMPLEX E/M VISIT ADD ON: HCPCS | Performed by: INTERNAL MEDICINE

## 2024-02-14 PROCEDURE — 1123F ACP DISCUSS/DSCN MKR DOCD: CPT | Performed by: INTERNAL MEDICINE

## 2024-02-14 PROCEDURE — 99214 OFFICE O/P EST MOD 30 MIN: CPT | Performed by: INTERNAL MEDICINE

## 2024-02-14 NOTE — PROGRESS NOTES
Lashonda Chi 1945 is a 78 y.o. female, here for evaluation of the following chief complaint(s):  Follow-up (Patient is here today to follow up on Sjogrens Syndrome)         ASSESSMENT/PLAN:    Lashonda Chi 1945 is a 78 y.o. female seen in follow-up for Sjogren's syndrome.    1.  Sjogren's syndrome-manifest as positive SSA and SSB, sicca symptoms, mild inflammatory arthritis.  Doing well on Plaquenil 300 mg daily.  As far as sicca symptoms her dry eyes are manageable with artificial tears and Systane.  As far as dry mouth she has found cevimeline to be more beneficial than pilocarpine.  She does have a nodule in the area of the left cervical lymph nodes which is likely an enlarged lymph node but will check an ultrasound.  I see no other signs of extraglandular manifestations.  I would not make any changes.  We will update blood work as below.    2.  High risk medication use-she follows with the eye doctor regularly while on Plaquenil.    3.  Osteoarthritis-she can continue Tylenol and Advil on a as needed basis.    4.  Osteopenia-with high FRAX on bone density scan from February 2023.  She got her first dose of Reclast in March, she is scheduled for her second dose this coming March 2024.  She is on vitamin D supplementation.    1. Sjogren's syndrome with other organ involvement (HCC)  -     CBC with Auto Differential; Future  -     Comprehensive Metabolic Panel; Future  -     Rheumatoid Factor; Future  -     Urinalysis; Future  -     Sedimentation Rate; Future  -     C-Reactive Protein; Future  -     Electrophoresis Protein, Serum; Future  -     US HEAD NECK SOFT TISSUE THYROID; Future  2. Osteopenia of multiple sites  3. High risk medication use  -     CBC with Auto Differential; Future  -     Comprehensive Metabolic Panel; Future  -     Rheumatoid Factor; Future  -     Urinalysis; Future  -     Sedimentation Rate; Future  -     C-Reactive Protein; Future  -     Electrophoresis Protein, Serum;

## 2024-02-14 NOTE — PATIENT INSTRUCTIONS
No changes to plaquenil    Do trial off of cevimeline    Have ultrasound of neck    Dr. Foss has ordered a radiology test for you such as \"Bone Density Scan, Ultrasound,   MRI, CT, etc\". The [facility] Radiology Scheduling Department should contact you within 1-2 weeks to schedule your appointment. If you do not hear from the scheduling department, please contact them at [phone]. If any questions, please call Dr. Foss's office if needed at [phone]. Thank you.

## 2024-02-16 LAB
ALBUMIN (CALCULATED): 3.2 G/DL (ref 3.5–4.7)
ALPHA-1-GLOBULIN: 0.2 G/DL (ref 0.2–0.4)
ALPHA-2-GLOBULIN: 0.7 G/DL (ref 0.5–1)
BETA GLOBULIN: 0.8 G/DL (ref 0.8–1.3)
GAMMA GLOBULIN: 0.5 G/DL (ref 0.7–1.6)
PATHOLOGIST: ABNORMAL
PROTEIN ELECTROPHORESIS, SERUM: ABNORMAL
TOTAL PROTEIN: 5.4 G/DL (ref 6.4–8.3)

## 2024-03-06 ENCOUNTER — OFFICE VISIT (OUTPATIENT)
Dept: CARDIOLOGY CLINIC | Age: 79
End: 2024-03-06
Payer: MEDICARE

## 2024-03-06 ENCOUNTER — HOSPITAL ENCOUNTER (OUTPATIENT)
Dept: ULTRASOUND IMAGING | Age: 79
Discharge: HOME | End: 2024-03-08
Payer: MEDICARE

## 2024-03-06 VITALS
HEIGHT: 62 IN | RESPIRATION RATE: 18 BRPM | BODY MASS INDEX: 25.6 KG/M2 | SYSTOLIC BLOOD PRESSURE: 118 MMHG | DIASTOLIC BLOOD PRESSURE: 70 MMHG | HEART RATE: 59 BPM | WEIGHT: 139.1 LBS

## 2024-03-06 DIAGNOSIS — R59.0 CERVICAL LYMPHADENOPATHY: ICD-10-CM

## 2024-03-06 DIAGNOSIS — M35.09 SJOGREN'S SYNDROME WITH OTHER ORGAN INVOLVEMENT (HCC): Chronic | ICD-10-CM

## 2024-03-06 DIAGNOSIS — I10 ESSENTIAL HYPERTENSION: Primary | Chronic | ICD-10-CM

## 2024-03-06 PROCEDURE — 76536 US EXAM OF HEAD AND NECK: CPT

## 2024-03-06 PROCEDURE — 3078F DIAST BP <80 MM HG: CPT | Performed by: INTERNAL MEDICINE

## 2024-03-06 PROCEDURE — 99214 OFFICE O/P EST MOD 30 MIN: CPT | Performed by: INTERNAL MEDICINE

## 2024-03-06 PROCEDURE — 1123F ACP DISCUSS/DSCN MKR DOCD: CPT | Performed by: INTERNAL MEDICINE

## 2024-03-06 PROCEDURE — 93000 ELECTROCARDIOGRAM COMPLETE: CPT | Performed by: INTERNAL MEDICINE

## 2024-03-06 PROCEDURE — 3074F SYST BP LT 130 MM HG: CPT | Performed by: INTERNAL MEDICINE

## 2024-03-06 ASSESSMENT — PATIENT HEALTH QUESTIONNAIRE - PHQ9
6. FEELING BAD ABOUT YOURSELF - OR THAT YOU ARE A FAILURE OR HAVE LET YOURSELF OR YOUR FAMILY DOWN: 0
9. THOUGHTS THAT YOU WOULD BE BETTER OFF DEAD, OR OF HURTING YOURSELF: NOT AT ALL
3. TROUBLE FALLING OR STAYING ASLEEP: SEVERAL DAYS
3. TROUBLE FALLING OR STAYING ASLEEP: 1
7. TROUBLE CONCENTRATING ON THINGS, SUCH AS READING THE NEWSPAPER OR WATCHING TELEVISION: 1
SUM OF ALL RESPONSES TO PHQ QUESTIONS 1-9: 11
SUM OF ALL RESPONSES TO PHQ QUESTIONS 1-9: 11
7. TROUBLE CONCENTRATING ON THINGS, SUCH AS READING THE NEWSPAPER OR WATCHING TELEVISION: SEVERAL DAYS
10. IF YOU CHECKED OFF ANY PROBLEMS, HOW DIFFICULT HAVE THESE PROBLEMS MADE IT FOR YOU TO DO YOUR WORK, TAKE CARE OF THINGS AT HOME, OR GET ALONG WITH OTHER PEOPLE: 1
8. MOVING OR SPEAKING SO SLOWLY THAT OTHER PEOPLE COULD HAVE NOTICED. OR THE OPPOSITE, BEING SO FIGETY OR RESTLESS THAT YOU HAVE BEEN MOVING AROUND A LOT MORE THAN USUAL: 0
5. POOR APPETITE OR OVEREATING: 2
SUM OF ALL RESPONSES TO PHQ QUESTIONS 1-9: 11
1. LITTLE INTEREST OR PLEASURE IN DOING THINGS: NEARLY EVERY DAY
2. FEELING DOWN, DEPRESSED OR HOPELESS: 3
8. MOVING OR SPEAKING SO SLOWLY THAT OTHER PEOPLE COULD HAVE NOTICED. OR THE OPPOSITE - BEING SO FIDGETY OR RESTLESS THAT YOU HAVE BEEN MOVING AROUND A LOT MORE THAN USUAL: NOT AT ALL
4. FEELING TIRED OR HAVING LITTLE ENERGY: 1
SUM OF ALL RESPONSES TO PHQ9 QUESTIONS 1 & 2: 6
5. POOR APPETITE OR OVEREATING: MORE THAN HALF THE DAYS
1. LITTLE INTEREST OR PLEASURE IN DOING THINGS: 3
SUM OF ALL RESPONSES TO PHQ QUESTIONS 1-9: 11
SUM OF ALL RESPONSES TO PHQ QUESTIONS 1-9: 11
2. FEELING DOWN, DEPRESSED OR HOPELESS: NEARLY EVERY DAY
SUM OF ALL RESPONSES TO PHQ9 QUESTIONS 1 & 2: 6
4. FEELING TIRED OR HAVING LITTLE ENERGY: SEVERAL DAYS
10. IF YOU CHECKED OFF ANY PROBLEMS, HOW DIFFICULT HAVE THESE PROBLEMS MADE IT FOR YOU TO DO YOUR WORK, TAKE CARE OF THINGS AT HOME, OR GET ALONG WITH OTHER PEOPLE: SOMEWHAT DIFFICULT
6. FEELING BAD ABOUT YOURSELF - OR THAT YOU ARE A FAILURE OR HAVE LET YOURSELF OR YOUR FAMILY DOWN: NOT AT ALL
9. THOUGHTS THAT YOU WOULD BE BETTER OFF DEAD, OR OF HURTING YOURSELF: 0

## 2024-03-06 NOTE — PROGRESS NOTES
CHIEF COMPLAINT: Arrhythmia and Bigeminy    HPI: Patient is a 78 y.o. female seen at the request of Олег Rain DO.      Patient with history of arrhythmia s/p AVNRT ablation presenting in FU for ventricular ectopy.      Patient denies any chest pain or anginal symptoms. No SOB.    No palpitations. No syncope or near-syncope.     Past Medical History:   Diagnosis Date    ANS (arteriolar nephrosclerosis)     Anxiety 2018    Arrhythmia 1995    Atrial Fibrillation 1995, Reentrant AV elfego tachycardia, 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 SAM Agustin every year    Atrial fibrillation (HCC)     AVNRT (AV elfego re-entry tachycardia) 2008?    s/p ablation /resolved; noted 9/28/18- had recent fast heart rate & hypertensoion & seeing Dr. Rani  9/28/18    Benign essential tremor 4/6/2022    Benign head tremor 4/6/2022    Chronic kidney disease 05/2017    Depression     Dry eyes     Dry mouth     Fatigue     GERD (gastroesophageal reflux disease)     Glaucoma     left eyes    Glaucoma     HH (hiatus hernia)     Hyperlipidemia     Hypertension     Hypothyroidism     Menopause     Osteoarthritis     Sjogren's syndrome (HCC)     Spinal stenosis     at lower back    Stool incontinence 09/2018    SVT (supraventricular tachycardia)     Tinnitus     Tremor        Patient Active Problem List   Diagnosis    Arrhythmia    Sjogren's disease (HCC)    Acquired hypothyroidism    AVNRT (AV elfego re-entry tachycardia) (HCC)    Primary osteoarthritis of left knee    Status post left knee replacement    Age-related osteoporosis without current pathological fracture    Essential hypertension    Mixed hyperlipidemia    Gastroesophageal reflux disease without esophagitis    Vitamin D deficiency    Generalized osteoarthritis    Pre-diabetes    Syncope and collapse    Bigeminy    Absolute anemia    Mixed stress and urge urinary incontinence    Tremor    Stage 3a chronic kidney disease (HCC)    Benign essential

## 2024-03-07 ENCOUNTER — OFFICE VISIT (OUTPATIENT)
Dept: PRIMARY CARE CLINIC | Age: 79
End: 2024-03-07
Payer: MEDICARE

## 2024-03-07 VITALS
HEIGHT: 62 IN | DIASTOLIC BLOOD PRESSURE: 68 MMHG | SYSTOLIC BLOOD PRESSURE: 120 MMHG | WEIGHT: 138.4 LBS | RESPIRATION RATE: 17 BRPM | BODY MASS INDEX: 25.47 KG/M2 | HEART RATE: 62 BPM | OXYGEN SATURATION: 98 % | TEMPERATURE: 97.7 F

## 2024-03-07 DIAGNOSIS — R61 NIGHT SWEATS: ICD-10-CM

## 2024-03-07 DIAGNOSIS — G25.0 BENIGN HEAD TREMOR: Chronic | ICD-10-CM

## 2024-03-07 DIAGNOSIS — M35.09 SJOGREN'S SYNDROME WITH OTHER ORGAN INVOLVEMENT (HCC): Chronic | ICD-10-CM

## 2024-03-07 DIAGNOSIS — R41.3 MEMORY IMPAIRMENT: ICD-10-CM

## 2024-03-07 DIAGNOSIS — Z00.00 MEDICARE ANNUAL WELLNESS VISIT, SUBSEQUENT: Primary | ICD-10-CM

## 2024-03-07 DIAGNOSIS — N18.31 STAGE 3A CHRONIC KIDNEY DISEASE (HCC): ICD-10-CM

## 2024-03-07 DIAGNOSIS — F51.01 PRIMARY INSOMNIA: ICD-10-CM

## 2024-03-07 DIAGNOSIS — J30.2 SEASONAL ALLERGIC RHINITIS, UNSPECIFIED TRIGGER: ICD-10-CM

## 2024-03-07 PROCEDURE — G0439 PPPS, SUBSEQ VISIT: HCPCS | Performed by: FAMILY MEDICINE

## 2024-03-07 PROCEDURE — 3078F DIAST BP <80 MM HG: CPT | Performed by: FAMILY MEDICINE

## 2024-03-07 PROCEDURE — 1123F ACP DISCUSS/DSCN MKR DOCD: CPT | Performed by: FAMILY MEDICINE

## 2024-03-07 PROCEDURE — 3074F SYST BP LT 130 MM HG: CPT | Performed by: FAMILY MEDICINE

## 2024-03-07 ASSESSMENT — LIFESTYLE VARIABLES: HOW MANY STANDARD DRINKS CONTAINING ALCOHOL DO YOU HAVE ON A TYPICAL DAY: PATIENT DOES NOT DRINK

## 2024-03-07 NOTE — PROGRESS NOTES
Make sure they were correct and educated  on the risk associated with missing meds or taking them incorrectly.  A list of medications is being sent home with patient today.      Check blood pressure at home twice a day.  Low-salt low caffeine diet.  Call if systolic blood pressure is above 150 and diastolic blood pressures above 85.  Only use a upper arm digital cuff.  A great deal of time spent reviewing medications, diet, exercise, social issues. Also reviewing the chart before entering the room with patient and finishing charting after leaving patient's room. More than half of that time was spent face to face with the patient in counseling and coordinating care.  Aggressive low-fat diet.  Avoid red meats, greasy fried foods, dairy products.  Avoid processed foods.  Take cholesterol medications without food.    I informed patient about the risk associated with noncompliance of medication and taking medications incorrectly.  Appropriate follow-up with myself and all specialist.  Encourage family members to take active role in assisting with medications and medical care.  If any confusion should develop to notify my office immediately to avoid risk of worsening medical condition

## 2024-03-08 ENCOUNTER — OFFICE VISIT (OUTPATIENT)
Dept: PAIN MANAGEMENT | Age: 79
End: 2024-03-08
Payer: MEDICARE

## 2024-03-08 VITALS
RESPIRATION RATE: 16 BRPM | HEART RATE: 60 BPM | TEMPERATURE: 97.4 F | DIASTOLIC BLOOD PRESSURE: 87 MMHG | BODY MASS INDEX: 25.48 KG/M2 | WEIGHT: 138.45 LBS | OXYGEN SATURATION: 98 % | HEIGHT: 62 IN | SYSTOLIC BLOOD PRESSURE: 157 MMHG

## 2024-03-08 DIAGNOSIS — G89.4 CHRONIC PAIN SYNDROME: Primary | ICD-10-CM

## 2024-03-08 DIAGNOSIS — R29.818 DIFFICULTY BALANCING: ICD-10-CM

## 2024-03-08 DIAGNOSIS — G58.8 INTERCOSTAL NEURALGIA: ICD-10-CM

## 2024-03-08 DIAGNOSIS — M41.56 OTHER SECONDARY SCOLIOSIS, LUMBAR REGION: ICD-10-CM

## 2024-03-08 DIAGNOSIS — R07.81 RIB PAIN ON LEFT SIDE: ICD-10-CM

## 2024-03-08 PROCEDURE — 99205 OFFICE O/P NEW HI 60 MIN: CPT | Performed by: PAIN MEDICINE

## 2024-03-08 PROCEDURE — 3077F SYST BP >= 140 MM HG: CPT | Performed by: PAIN MEDICINE

## 2024-03-08 PROCEDURE — 3079F DIAST BP 80-89 MM HG: CPT | Performed by: PAIN MEDICINE

## 2024-03-08 PROCEDURE — 1123F ACP DISCUSS/DSCN MKR DOCD: CPT | Performed by: PAIN MEDICINE

## 2024-03-08 RX ORDER — GABAPENTIN 100 MG/1
200 CAPSULE ORAL AS NEEDED
COMMUNITY

## 2024-03-08 RX ORDER — ACETAMINOPHEN AND CODEINE PHOSPHATE 300; 30 MG/1; MG/1
TABLET ORAL
COMMUNITY
Start: 2024-01-25

## 2024-03-08 RX ORDER — FLUTICASONE PROPIONATE 50 MCG
2 SPRAY, SUSPENSION (ML) NASAL
Qty: 16 G | Refills: 12 | Status: SHIPPED | OUTPATIENT
Start: 2024-03-08

## 2024-03-08 NOTE — TELEPHONE ENCOUNTER
Patients last appointment 3/7/2024.  Patients next scheduled appointment   Future Appointments   Date Time Provider Department Center   3/8/2024  9:15 AM Monik Villarreal DO [facility]   3/27/2024 10:30 AM [facility]  1 [facility]   4/24/2024  9:45 AM Олег Rain DO [facility]   6/12/2024  1:30 PM Blake Black MD [facility]   8/14/2024 10:00 AM Papito Foss DO [facility]
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
## 2024-03-11 DIAGNOSIS — M35.09 SJOGREN'S SYNDROME WITH OTHER ORGAN INVOLVEMENT (HCC): ICD-10-CM

## 2024-03-12 RX ORDER — CEVIMELINE HYDROCHLORIDE 30 MG/1
30 CAPSULE ORAL 3 TIMES DAILY
Qty: 90 CAPSULE | Refills: 5 | Status: SHIPPED | OUTPATIENT
Start: 2024-03-12

## 2024-03-20 ENCOUNTER — TELEPHONE (OUTPATIENT)
Dept: RHEUMATOLOGY | Age: 79
End: 2024-03-20

## 2024-03-20 DIAGNOSIS — M81.0 AGE-RELATED OSTEOPOROSIS WITHOUT CURRENT PATHOLOGICAL FRACTURE: Primary | Chronic | ICD-10-CM

## 2024-03-20 RX ORDER — ONDANSETRON 2 MG/ML
8 INJECTION INTRAMUSCULAR; INTRAVENOUS
OUTPATIENT
Start: 2024-03-20

## 2024-03-20 RX ORDER — ALBUTEROL SULFATE 90 UG/1
4 AEROSOL, METERED RESPIRATORY (INHALATION) PRN
OUTPATIENT
Start: 2024-03-20

## 2024-03-20 RX ORDER — SODIUM CHLORIDE 9 MG/ML
5-250 INJECTION, SOLUTION INTRAVENOUS PRN
OUTPATIENT
Start: 2024-03-20

## 2024-03-20 RX ORDER — HEPARIN SODIUM (PORCINE) LOCK FLUSH IV SOLN 100 UNIT/ML 100 UNIT/ML
500 SOLUTION INTRAVENOUS PRN
OUTPATIENT
Start: 2024-03-20

## 2024-03-20 RX ORDER — ACETAMINOPHEN 325 MG/1
650 TABLET ORAL
OUTPATIENT
Start: 2024-03-20

## 2024-03-20 RX ORDER — SODIUM CHLORIDE 9 MG/ML
INJECTION, SOLUTION INTRAVENOUS CONTINUOUS
OUTPATIENT
Start: 2024-03-20

## 2024-03-20 RX ORDER — DIPHENHYDRAMINE HYDROCHLORIDE 50 MG/ML
50 INJECTION INTRAMUSCULAR; INTRAVENOUS
OUTPATIENT
Start: 2024-03-20

## 2024-03-20 RX ORDER — EPINEPHRINE 1 MG/ML
0.3 INJECTION, SOLUTION, CONCENTRATE INTRAVENOUS PRN
OUTPATIENT
Start: 2024-03-20

## 2024-03-20 RX ORDER — SODIUM CHLORIDE 0.9 % (FLUSH) 0.9 %
5-40 SYRINGE (ML) INJECTION PRN
OUTPATIENT
Start: 2024-03-20

## 2024-03-20 RX ORDER — FAMOTIDINE 10 MG/ML
20 INJECTION, SOLUTION INTRAVENOUS
OUTPATIENT
Start: 2024-03-20

## 2024-03-20 NOTE — TELEPHONE ENCOUNTER
Patient is scheduled for her Reclast infusion appointment on 03/27/2024 at [facility].       Please update therapy plan/orders and labs if necessary.        Electronically signed by Love Addison CMA on 3/20/2024 at 4:18 PM

## 2024-03-21 NOTE — TELEPHONE ENCOUNTER
Therapy plan/orders faxed.      Electronically signed by Love Addison CMA on 3/21/2024 at 1:51 PM

## 2024-03-22 RX ORDER — ZOLEDRONIC ACID 5 MG/100ML
5 INJECTION, SOLUTION INTRAVENOUS ONCE
OUTPATIENT
Start: 2024-03-22 | End: 2024-03-22

## 2024-03-22 RX ORDER — SODIUM CHLORIDE 9 MG/ML
INJECTION, SOLUTION INTRAVENOUS ONCE
OUTPATIENT
Start: 2024-03-22 | End: 2024-03-22

## 2024-03-25 NOTE — PROGRESS NOTES
Received a referral for Reclast along with labs. Called to hospital Pharmacist Verito to have creatinine clearance calculated. Patient is 78 years old, height 5'2\", weight 138 lb , gender female, creatinine level 0.9 mg/dL. Pharmacist did calculation and I was told creatinine clearance is 46 ml/min.

## 2024-03-27 ENCOUNTER — HOSPITAL ENCOUNTER (OUTPATIENT)
Dept: INFUSION THERAPY | Age: 79
Setting detail: INFUSION SERIES
Discharge: HOME | End: 2024-03-27
Payer: MEDICARE

## 2024-03-27 VITALS
OXYGEN SATURATION: 100 % | TEMPERATURE: 97.1 F | HEART RATE: 57 BPM | WEIGHT: 135 LBS | SYSTOLIC BLOOD PRESSURE: 139 MMHG | DIASTOLIC BLOOD PRESSURE: 91 MMHG | BODY MASS INDEX: 24.84 KG/M2 | HEIGHT: 62 IN | RESPIRATION RATE: 16 BRPM

## 2024-03-27 DIAGNOSIS — M85.89 OSTEOPENIA OF MULTIPLE SITES: ICD-10-CM

## 2024-03-27 DIAGNOSIS — M81.0 AGE-RELATED OSTEOPOROSIS WITHOUT CURRENT PATHOLOGICAL FRACTURE: Primary | ICD-10-CM

## 2024-03-27 PROCEDURE — 2580000003 HC RX 258: Performed by: INTERNAL MEDICINE

## 2024-03-27 PROCEDURE — 6360000002 HC RX W HCPCS: Performed by: INTERNAL MEDICINE

## 2024-03-27 PROCEDURE — 96365 THER/PROPH/DIAG IV INF INIT: CPT

## 2024-03-27 RX ORDER — SODIUM CHLORIDE 9 MG/ML
INJECTION, SOLUTION INTRAVENOUS ONCE
Status: CANCELLED | OUTPATIENT
Start: 2024-03-27 | End: 2024-03-27

## 2024-03-27 RX ORDER — ZOLEDRONIC ACID 5 MG/100ML
5 INJECTION, SOLUTION INTRAVENOUS ONCE
Status: CANCELLED | OUTPATIENT
Start: 2024-03-27 | End: 2024-03-27

## 2024-03-27 RX ORDER — HEPARIN 100 UNIT/ML
500 SYRINGE INTRAVENOUS PRN
OUTPATIENT
Start: 2024-03-27

## 2024-03-27 RX ORDER — SODIUM CHLORIDE 0.9 % (FLUSH) 0.9 %
5-40 SYRINGE (ML) INJECTION PRN
OUTPATIENT
Start: 2024-03-27

## 2024-03-27 RX ORDER — DIPHENHYDRAMINE HYDROCHLORIDE 50 MG/ML
50 INJECTION INTRAMUSCULAR; INTRAVENOUS
OUTPATIENT
Start: 2024-03-27

## 2024-03-27 RX ORDER — SODIUM CHLORIDE 9 MG/ML
5-250 INJECTION, SOLUTION INTRAVENOUS PRN
OUTPATIENT
Start: 2024-03-27

## 2024-03-27 RX ORDER — ACETAMINOPHEN 325 MG/1
650 TABLET ORAL
OUTPATIENT
Start: 2024-03-27

## 2024-03-27 RX ORDER — ONDANSETRON 2 MG/ML
8 INJECTION INTRAMUSCULAR; INTRAVENOUS
OUTPATIENT
Start: 2024-03-27

## 2024-03-27 RX ORDER — SODIUM CHLORIDE 9 MG/ML
INJECTION, SOLUTION INTRAVENOUS CONTINUOUS
OUTPATIENT
Start: 2024-03-27

## 2024-03-27 RX ORDER — ZOLEDRONIC ACID 5 MG/100ML
5 INJECTION, SOLUTION INTRAVENOUS ONCE
Status: COMPLETED | OUTPATIENT
Start: 2024-03-27 | End: 2024-03-27

## 2024-03-27 RX ORDER — EPINEPHRINE 1 MG/ML
0.3 INJECTION, SOLUTION, CONCENTRATE INTRAVENOUS PRN
OUTPATIENT
Start: 2024-03-27

## 2024-03-27 RX ORDER — SODIUM CHLORIDE 9 MG/ML
INJECTION, SOLUTION INTRAVENOUS ONCE
Status: COMPLETED | OUTPATIENT
Start: 2024-03-27 | End: 2024-03-27

## 2024-03-27 RX ORDER — ALBUTEROL SULFATE 90 UG/1
4 AEROSOL, METERED RESPIRATORY (INHALATION) PRN
OUTPATIENT
Start: 2024-03-27

## 2024-03-27 RX ORDER — SODIUM CHLORIDE 0.9 % (FLUSH) 0.9 %
5-40 SYRINGE (ML) INJECTION PRN
Status: DISCONTINUED | OUTPATIENT
Start: 2024-03-27 | End: 2024-03-28 | Stop reason: HOSPADM

## 2024-03-27 RX ADMIN — SODIUM CHLORIDE, PRESERVATIVE FREE 10 ML: 5 INJECTION INTRAVENOUS at 11:13

## 2024-03-27 RX ADMIN — ZOLEDRONIC ACID 5 MG: 5 INJECTION, SOLUTION INTRAVENOUS at 10:52

## 2024-03-27 RX ADMIN — SODIUM CHLORIDE, PRESERVATIVE FREE 10 ML: 5 INJECTION INTRAVENOUS at 10:44

## 2024-03-27 RX ADMIN — SODIUM CHLORIDE: 9 INJECTION, SOLUTION INTRAVENOUS at 10:46

## 2024-03-27 NOTE — PROGRESS NOTES
Tolerated infusion well.  Reviewed therapy plan, offered education material and/or discharge material, reviewed medication information and signs and symptoms  and educated on possible side effects, verbalizes good knowledge of current plan patient verbalizes understanding, and has no signs or symptoms to report at this time.   Patient discharged. Patient alert and oriented x3.   No distress noted.   Vital signs stable.   Patient denies any new or worsening pain.  Patient denies any needs.  All questions answered.  Next appointment scheduled. declines copy of AVS.

## 2024-04-10 DIAGNOSIS — M35.09 SJOGREN'S SYNDROME WITH OTHER ORGAN INVOLVEMENT (HCC): ICD-10-CM

## 2024-04-10 RX ORDER — LEVOTHYROXINE SODIUM 112 UG/1
112 TABLET ORAL DAILY
Qty: 90 TABLET | Refills: 5 | OUTPATIENT
Start: 2024-04-10

## 2024-04-11 RX ORDER — CEVIMELINE HYDROCHLORIDE 30 MG/1
30 CAPSULE ORAL 3 TIMES DAILY
Qty: 90 CAPSULE | Refills: 5 | Status: SHIPPED | OUTPATIENT
Start: 2024-04-11

## 2024-04-22 DIAGNOSIS — E53.8 VITAMIN B 12 DEFICIENCY: ICD-10-CM

## 2024-04-22 DIAGNOSIS — M81.0 AGE-RELATED OSTEOPOROSIS WITHOUT CURRENT PATHOLOGICAL FRACTURE: Chronic | ICD-10-CM

## 2024-04-22 DIAGNOSIS — E11.9 DIET-CONTROLLED DIABETES MELLITUS (HCC): ICD-10-CM

## 2024-04-22 DIAGNOSIS — E03.9 ACQUIRED HYPOTHYROIDISM: Chronic | ICD-10-CM

## 2024-04-22 DIAGNOSIS — I10 ESSENTIAL HYPERTENSION: Chronic | ICD-10-CM

## 2024-04-22 DIAGNOSIS — E78.2 MIXED HYPERLIPIDEMIA: Chronic | ICD-10-CM

## 2024-04-22 DIAGNOSIS — M81.0 AGE-RELATED OSTEOPOROSIS WITHOUT CURRENT PATHOLOGICAL FRACTURE: ICD-10-CM

## 2024-04-22 LAB
ALBUMIN: 4.3 G/DL (ref 3.5–5.2)
ALP BLD-CCNC: 62 U/L (ref 35–104)
ALT SERPL-CCNC: 17 U/L (ref 0–32)
ANION GAP SERPL CALCULATED.3IONS-SCNC: 13 MMOL/L (ref 7–16)
ANION GAP SERPL CALCULATED.3IONS-SCNC: 14 MMOL/L (ref 7–16)
AST SERPL-CCNC: 27 U/L (ref 0–31)
BASOPHILS ABSOLUTE: 0.08 K/UL (ref 0–0.2)
BASOPHILS RELATIVE PERCENT: 1 % (ref 0–2)
BILIRUB SERPL-MCNC: 0.2 MG/DL (ref 0–1.2)
BILIRUBIN URINE: NEGATIVE
BUN BLDV-MCNC: 20 MG/DL (ref 6–23)
BUN BLDV-MCNC: 21 MG/DL (ref 6–23)
CALCIUM SERPL-MCNC: 9.1 MG/DL (ref 8.6–10.2)
CALCIUM SERPL-MCNC: 9.1 MG/DL (ref 8.6–10.2)
CHLORIDE BLD-SCNC: 105 MMOL/L (ref 98–107)
CHLORIDE BLD-SCNC: 106 MMOL/L (ref 98–107)
CHOLESTEROL: 183 MG/DL
CO2: 22 MMOL/L (ref 22–29)
CO2: 22 MMOL/L (ref 22–29)
COLOR: YELLOW
COMMENT: ABNORMAL
CREAT SERPL-MCNC: 1.1 MG/DL (ref 0.5–1)
CREAT SERPL-MCNC: 1.1 MG/DL (ref 0.5–1)
EOSINOPHILS ABSOLUTE: 0.59 K/UL (ref 0.05–0.5)
EOSINOPHILS RELATIVE PERCENT: 8 % (ref 0–6)
GFR SERPL CREATININE-BSD FRML MDRD: 52 ML/MIN/1.73M2
GFR SERPL CREATININE-BSD FRML MDRD: 54 ML/MIN/1.73M2
GLUCOSE BLD-MCNC: 86 MG/DL (ref 74–99)
GLUCOSE BLD-MCNC: 88 MG/DL (ref 74–99)
GLUCOSE URINE: NEGATIVE MG/DL
HCT VFR BLD CALC: 39.4 % (ref 34–48)
HDLC SERPL-MCNC: 52 MG/DL
HEMOGLOBIN: 13 G/DL (ref 11.5–15.5)
IMMATURE GRANULOCYTES %: 0 % (ref 0–5)
IMMATURE GRANULOCYTES ABSOLUTE: 0.03 K/UL (ref 0–0.58)
KETONES, URINE: NEGATIVE MG/DL
LDL CHOLESTEROL: 109 MG/DL
LEUKOCYTE ESTERASE, URINE: NEGATIVE
LYMPHOCYTES ABSOLUTE: 1.45 K/UL (ref 1.5–4)
LYMPHOCYTES RELATIVE PERCENT: 20 % (ref 20–42)
MCH RBC QN AUTO: 31.3 PG (ref 26–35)
MCHC RBC AUTO-ENTMCNC: 33 G/DL (ref 32–34.5)
MCV RBC AUTO: 94.9 FL (ref 80–99.9)
MONOCYTES ABSOLUTE: 0.67 K/UL (ref 0.1–0.95)
MONOCYTES RELATIVE PERCENT: 9 % (ref 2–12)
NEUTROPHILS ABSOLUTE: 4.31 K/UL (ref 1.8–7.3)
NEUTROPHILS RELATIVE PERCENT: 61 % (ref 43–80)
NITRITE, URINE: NEGATIVE
PDW BLD-RTO: 13.2 % (ref 11.5–15)
PH UA: 6 (ref 5–9)
PLATELET # BLD: 197 K/UL (ref 130–450)
PMV BLD AUTO: 11.4 FL (ref 7–12)
POTASSIUM SERPL-SCNC: 4.3 MMOL/L (ref 3.5–5)
POTASSIUM SERPL-SCNC: 4.3 MMOL/L (ref 3.5–5)
PROTEIN UA: NEGATIVE MG/DL
RBC # BLD: 4.15 M/UL (ref 3.5–5.5)
SODIUM BLD-SCNC: 141 MMOL/L (ref 132–146)
SODIUM BLD-SCNC: 141 MMOL/L (ref 132–146)
SPECIFIC GRAVITY UA: >1.03 (ref 1–1.03)
T4 TOTAL: 7.9 UG/DL (ref 4.5–11.7)
TOTAL PROTEIN: 6.5 G/DL (ref 6.4–8.3)
TRIGL SERPL-MCNC: 108 MG/DL
TSH SERPL DL<=0.05 MIU/L-ACNC: 0.09 UIU/ML (ref 0.27–4.2)
TURBIDITY: CLEAR
URINE HGB: NEGATIVE
UROBILINOGEN, URINE: 0.2 EU/DL (ref 0–1)
VLDLC SERPL CALC-MCNC: 22 MG/DL
WBC # BLD: 7.1 K/UL (ref 4.5–11.5)

## 2024-04-23 LAB
HBA1C MFR BLD: 5.5 % (ref 4–5.6)
VITAMIN B-12: 633 PG/ML (ref 211–946)
VITAMIN D 25-HYDROXY: 93.7 NG/ML (ref 30–100)

## 2024-04-24 ENCOUNTER — OFFICE VISIT (OUTPATIENT)
Dept: PRIMARY CARE CLINIC | Age: 79
End: 2024-04-24
Payer: MEDICARE

## 2024-04-24 VITALS
WEIGHT: 138 LBS | TEMPERATURE: 97.7 F | BODY MASS INDEX: 25.24 KG/M2 | RESPIRATION RATE: 17 BRPM | OXYGEN SATURATION: 99 % | HEART RATE: 59 BPM

## 2024-04-24 DIAGNOSIS — E78.2 MIXED HYPERLIPIDEMIA: Chronic | ICD-10-CM

## 2024-04-24 DIAGNOSIS — E53.8 VITAMIN B 12 DEFICIENCY: ICD-10-CM

## 2024-04-24 DIAGNOSIS — R79.89 ELEVATED SERUM CREATININE: ICD-10-CM

## 2024-04-24 DIAGNOSIS — M35.09 SJOGREN'S SYNDROME WITH OTHER ORGAN INVOLVEMENT (HCC): Primary | Chronic | ICD-10-CM

## 2024-04-24 DIAGNOSIS — I10 ESSENTIAL HYPERTENSION: Chronic | ICD-10-CM

## 2024-04-24 DIAGNOSIS — E03.9 ACQUIRED HYPOTHYROIDISM: ICD-10-CM

## 2024-04-24 DIAGNOSIS — M15.9 GENERALIZED OSTEOARTHRITIS: ICD-10-CM

## 2024-04-24 DIAGNOSIS — E55.9 VITAMIN D DEFICIENCY: ICD-10-CM

## 2024-04-24 PROCEDURE — 1123F ACP DISCUSS/DSCN MKR DOCD: CPT | Performed by: FAMILY MEDICINE

## 2024-04-24 PROCEDURE — 99214 OFFICE O/P EST MOD 30 MIN: CPT | Performed by: FAMILY MEDICINE

## 2024-04-24 SDOH — ECONOMIC STABILITY: INCOME INSECURITY: HOW HARD IS IT FOR YOU TO PAY FOR THE VERY BASICS LIKE FOOD, HOUSING, MEDICAL CARE, AND HEATING?: NOT HARD AT ALL

## 2024-04-24 SDOH — ECONOMIC STABILITY: FOOD INSECURITY: WITHIN THE PAST 12 MONTHS, THE FOOD YOU BOUGHT JUST DIDN'T LAST AND YOU DIDN'T HAVE MONEY TO GET MORE.: NEVER TRUE

## 2024-04-24 SDOH — ECONOMIC STABILITY: FOOD INSECURITY: WITHIN THE PAST 12 MONTHS, YOU WORRIED THAT YOUR FOOD WOULD RUN OUT BEFORE YOU GOT MONEY TO BUY MORE.: NEVER TRUE

## 2024-04-24 ASSESSMENT — ENCOUNTER SYMPTOMS
BACK PAIN: 1
EYES NEGATIVE: 1
GASTROINTESTINAL NEGATIVE: 1
RESPIRATORY NEGATIVE: 1
ALLERGIC/IMMUNOLOGIC NEGATIVE: 1

## 2024-04-24 ASSESSMENT — PATIENT HEALTH QUESTIONNAIRE - PHQ9
SUM OF ALL RESPONSES TO PHQ QUESTIONS 1-9: 0
SUM OF ALL RESPONSES TO PHQ QUESTIONS 1-9: 0
2. FEELING DOWN, DEPRESSED OR HOPELESS: NOT AT ALL
SUM OF ALL RESPONSES TO PHQ QUESTIONS 1-9: 0
SUM OF ALL RESPONSES TO PHQ QUESTIONS 1-9: 0
1. LITTLE INTEREST OR PLEASURE IN DOING THINGS: NOT AT ALL
SUM OF ALL RESPONSES TO PHQ9 QUESTIONS 1 & 2: 0

## 2024-04-24 NOTE — PROGRESS NOTES
Neurological:      Mental Status: She is alert and oriented to person, place, and time.      Comments: Sl   tremor neck   Psychiatric:         Behavior: Behavior normal.         Lashonda was seen today for follow-up and discuss labs.    Diagnoses and all orders for this visit:    Sjogren's syndrome with other organ involvement (HCC)    Essential hypertension  -     Comprehensive Metabolic Panel; Future    Mixed hyperlipidemia    Generalized osteoarthritis    Elevated serum creatinine  -     Comprehensive Metabolic Panel; Future        Comments: dc  advil pm and  ibf  she   fele unable  due to arth     re do cmp one month  inc fluids     Plan    6mo     inc fludis        I educated the patient about all medications.  Make sure they were correct and educated  on the risk associated with missing meds or taking them incorrectly.  A list of medications is being sent home with patient today.    Check blood pressure at home twice a day.  Low-salt low caffeine diet.  Call if systolic blood pressure is above 150 and diastolic blood pressures above 85.  Only use a upper arm digital cuff.    Aggressive low-fat diet.  Avoid red meats, greasy fried foods, dairy products.  Avoid processed foods.  Take cholesterol medications without food.    I informed patient about the risk associated with noncompliance of medication and taking medications incorrectly.  Appropriate follow-up with myself and all specialist.  Encourage family members to take active role in assisting with medications and medical care.  If any confusion should develop to notify my office immediately to avoid risk of worsening medical condition    A great deal of time spent reviewing medications, diet, exercise, social issues. Also reviewing the chart before entering the room with patient and finishing charting after leaving patient's room. More than half of that time was spent face to face with the patient in counseling and coordinating care.      Follow Up: No

## 2024-05-08 ENCOUNTER — OFFICE VISIT (OUTPATIENT)
Dept: PAIN MANAGEMENT | Age: 79
End: 2024-05-08
Payer: MEDICARE

## 2024-05-08 VITALS
HEART RATE: 57 BPM | TEMPERATURE: 97.3 F | SYSTOLIC BLOOD PRESSURE: 158 MMHG | RESPIRATION RATE: 16 BRPM | HEIGHT: 62 IN | OXYGEN SATURATION: 98 % | DIASTOLIC BLOOD PRESSURE: 87 MMHG | BODY MASS INDEX: 25.4 KG/M2 | WEIGHT: 138.01 LBS

## 2024-05-08 DIAGNOSIS — R07.81 RIB PAIN ON LEFT SIDE: ICD-10-CM

## 2024-05-08 DIAGNOSIS — G58.8 INTERCOSTAL NEURALGIA: ICD-10-CM

## 2024-05-08 DIAGNOSIS — G89.4 CHRONIC PAIN SYNDROME: Primary | ICD-10-CM

## 2024-05-08 DIAGNOSIS — R29.818 DIFFICULTY BALANCING: ICD-10-CM

## 2024-05-08 DIAGNOSIS — M41.56 OTHER SECONDARY SCOLIOSIS, LUMBAR REGION: ICD-10-CM

## 2024-05-08 PROCEDURE — 99213 OFFICE O/P EST LOW 20 MIN: CPT

## 2024-05-08 PROCEDURE — 3079F DIAST BP 80-89 MM HG: CPT | Performed by: PAIN MEDICINE

## 2024-05-08 PROCEDURE — 3077F SYST BP >= 140 MM HG: CPT | Performed by: PAIN MEDICINE

## 2024-05-08 PROCEDURE — 99213 OFFICE O/P EST LOW 20 MIN: CPT | Performed by: PAIN MEDICINE

## 2024-05-08 PROCEDURE — 1123F ACP DISCUSS/DSCN MKR DOCD: CPT | Performed by: PAIN MEDICINE
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
## 2024-05-22 DIAGNOSIS — I10 ESSENTIAL HYPERTENSION: Chronic | ICD-10-CM

## 2024-05-22 DIAGNOSIS — R79.89 ELEVATED SERUM CREATININE: ICD-10-CM

## 2024-05-22 LAB
ALBUMIN: 3.8 G/DL (ref 3.5–5.2)
ALP BLD-CCNC: 61 U/L (ref 35–104)
ALT SERPL-CCNC: 11 U/L (ref 0–32)
ANION GAP SERPL CALCULATED.3IONS-SCNC: 12 MMOL/L (ref 7–16)
AST SERPL-CCNC: 19 U/L (ref 0–31)
BILIRUB SERPL-MCNC: <0.2 MG/DL (ref 0–1.2)
BUN BLDV-MCNC: 21 MG/DL (ref 6–23)
CALCIUM SERPL-MCNC: 8.5 MG/DL (ref 8.6–10.2)
CHLORIDE BLD-SCNC: 108 MMOL/L (ref 98–107)
CO2: 22 MMOL/L (ref 22–29)
CREAT SERPL-MCNC: 1 MG/DL (ref 0.5–1)
GFR, ESTIMATED: 57 ML/MIN/1.73M2
GLUCOSE BLD-MCNC: 81 MG/DL (ref 74–99)
POTASSIUM SERPL-SCNC: 4.9 MMOL/L (ref 3.5–5)
SODIUM BLD-SCNC: 142 MMOL/L (ref 132–146)
TOTAL PROTEIN: 5.9 G/DL (ref 6.4–8.3)

## 2024-05-23 NOTE — RESULT ENCOUNTER NOTE
Notify patient that her kidney function creatinine is back in the normal range.  Continue same regular appointment

## 2024-05-30 ENCOUNTER — TELEPHONE (OUTPATIENT)
Dept: PRIMARY CARE CLINIC | Age: 79
End: 2024-05-30

## 2024-05-30 DIAGNOSIS — G89.29 CHRONIC BILATERAL THORACIC BACK PAIN: Primary | ICD-10-CM

## 2024-05-30 DIAGNOSIS — M54.6 CHRONIC BILATERAL THORACIC BACK PAIN: Primary | ICD-10-CM

## 2024-05-30 RX ORDER — IBUPROFEN 800 MG/1
800 TABLET ORAL 2 TIMES DAILY PRN
Qty: 60 TABLET | Refills: 5 | Status: SHIPPED | OUTPATIENT
Start: 2024-05-30

## 2024-05-30 NOTE — TELEPHONE ENCOUNTER
Patients last appointment 4/24/2024.  Patients next scheduled appointment   Future Appointments   Date Time Provider Department Center   6/12/2024  1:30 PM Blake Black MD [facility]   8/14/2024 10:00 AM Papito Foss DO BDM [facility]   10/23/2024  9:45 AM Олег Rain DO [facility]   3/27/2025  9:00 AM SEB INF CLINIC  6 SEB Inf [facility]

## 2024-05-30 NOTE — TELEPHONE ENCOUNTER
----- Message from Lashonda Douglass sent at 5/29/2024  3:38 PM EDT -----  Regarding: FW: Scoliosis/ Attention for internal organs and function  Contact: [phone]    ----- Message -----  From: Lashonda Chi \"Teresa\"  Sent: 5/29/2024   3:29 PM EDT  To: Daniela Sweeney Clinical Staff  Subject: Scoliosis/ Attention for internal organs and#    Basically, is there anyone locally that is well-versed in scoliosis and the issues it causes that you can give me a referral.   Worries: incidental pain in hips and lower back                    effect of the type of scoliosis on the function of  my internal organs, ie I did gardening yesterday morning seated on the ground trimming back an ornamental grass about 5 feet tall and a couple of other things. I quit after two hour to take a break. Subsequently I needed to cook and/or freeze about 6 pounds of ground beef. I put the ingredients together to cook two meatloaf pans. While working on this, I had a frightening reaction from my body--racing pulse, light headed, I sat down a couple of times to breath and try to relax. This was as close to being breathless as I have ever experienced. But the job needed to get done and I continued. My pulse relaxed but was still unusual and I felt less out of control.  Later I bent over to pick something up off the floor and my heart beat so hard my  noticed my reaction when I stood up. I am asking about the effects of my scoliosis because I have noticed abdominal   discomfort, increasing acid reflux, fatigue, difficulty eating/no appetite, slows down my bowel movements. Then I feel pregnant because of the bloating in my abdomen. As soon as I poop well, my abdomen goes back to normal and the occurrence starts over again. So, who can tell me what is going on between my twisted spine and my internal soft tissues and organs. Thanks.

## 2024-06-06 NOTE — CONSULTS
Subjective:   Chioma Mock is a 51 y.o. female here today for low vitamin D; elevated total cholesterol and LDL cholesterol; obesity    Vitamin D insufficiency  Ongoing issues; patient previously had been taking 2000 international units once a day; based on discussion and review I have asked her to increase it to 2000 twice a day    Obesity (BMI 30-39.9)  Ongoing issue; patient is continuing to make lifestyle changes. Since her last appointment she has lost 7 pounds    Mixed hyperlipidemia  Ongoing issues; recent labs and intervene detail with the patient today shows her total cholesterol is at 230 and her LDL cholesterol at 154. We have reviewed healthy eating along with daily exercise to help improve this. Patient would like to do lifestyle changes prior to starting any medication         Current medicines (including changes today)  Current Outpatient Prescriptions   Medication Sig Dispense Refill   • buPROPion (WELLBUTRIN SR) 200 MG SR tablet Take 200 mg by mouth 2 times a day.     • modafinil (PROVIGIL) 200 MG Tab Take 200 mg by mouth every day.     • LamoTRIgine (LAMICTAL PO) Take  by mouth.     • trazodone (DESYREL) 150 MG Tab Take 150 mg by mouth every evening.     • levonorgestrel (MIRENA, 52 MG,) 20 MCG/24HR IUD 1 Each by Intrauterine route Once.     • clonazepam (KLONOPIN) 0.5 MG TABS Take 0.5 mg by mouth every bedtime.       No current facility-administered medications for this visit.      She  has a past medical history of Adjustment reaction with anxiety and depression (8/22/2009); Benign Neoplasm of Muscle left leg. 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    ROS   No chest pain, no shortness of breath, no abdominal pain       Objective:     Blood pressure 118/72, pulse 88, temperature 37 °C (98.6 °F), height 1.727 m (5' 8\"), weight 110.2 kg (243 lb), SpO2 93 %. Body mass index is 36.95 kg/m².   Physical Exam:  Alert, oriented in no acute distress.  Eye contact is good, speech goal directed, affect calm  HEENT: conjunctiva non-injected, sclera non-icteric.  Pinna normal. TM pearly gray.   Oral mucous membranes pink and moist with no lesions.  Neck No adenopathy or masses in the neck or supraclavicular regions.  Lungs: clear to auscultation bilaterally with good excursion.  CV: regular rate and rhythm.  Abdomen: soft, nontender, No CVAT; obese  Ext: no edema, color normal, vascularity normal, temperature normal        Assessment and Plan:   The following treatment plan was discussed     1. Vitamin D insufficiency  VITAMIN D,25 HYDROXY    Uncontrolled; continue 2000 international units twice daily; recheck labs in 6 months   2. Mixed hyperlipidemia  COMP METABOLIC PANEL    LIPID PROFILE    Uncontrolled; continue diet changes along with daily exercise; reevaluate in 6 months   3. Obesity (BMI 30-39.9)  TSH    Improved. Continue healthy lifestyle; reevaluate in 6 months       Followup: Return in about 6 months (around 9/28/2018) for lab review, Short.            " fibrillation  8. Lexiscan stress test November 6 on the left 2017 myocardial perfusion imaging normal and EF normal at 83% and low risk preoperative pharmacological stress stress test  9. Small hiatal hernia by CTA of the chest June 2021  10. Sjogren's syndrome  11. Hypothyroidism  12. Glaucoma left eye  13. Bilateral cataracts  14. GERD  15. Spinal stenosis   16. joint replacement on the left knee in 2017      Medications Prior to admit:  Prior to Admission medications    Medication Sig Start Date End Date Taking? Authorizing Provider   [facility]) 30 MG capsule Take 1 capsule by mouth 3 times daily 4/26/21  Yes Олег Rain DO   fluticasone (FLONASE) 50 MCG/ACT nasal spray 2 sprays by Nasal route every morning (before breakfast) 3/13/21  Yes Олег Rain DO   gabapentin (NEURONTIN) 100 MG capsule Take 2 capsules by mouth 3 times daily for 360 days.  2/23/21 2/18/22 Yes Олег Rain DO   metoprolol tartrate (LOPRESSOR) 50 MG tablet Take 0.5 tablets by mouth 2 times daily 1/26/21  Yes Олег Rain DO   venlafaxine (EFFEXOR XR) 37.5 MG extended release capsule Take 1 capsule by mouth daily 1/6/21  Yes Олег Rain DO   Alpha-D-Galactosidase (BEANO PO) Take by mouth as needed   Yes Historical Provider, MD   zinc gluconate 50 MG tablet Take 50 mg by mouth daily   Yes Historical Provider, MD   diclofenac (VOLTAREN) 75 MG EC tablet Take 1 tablet by mouth daily LD 9/28/18 9/1/20  Yes Олег Rain DO   levothyroxine (SYNTHROID) 125 MCG tablet Take 1 tablet by mouth Daily 7/6/20  Yes Олег Rain DO   vitamin D (VITAMIN D3 ULTRA STRENGTH) 125 MCG (5000 UT) CAPS capsule Take 5,000 Units by mouth daily   Yes Historical Provider, MD   Eyelid Cleansers (HYPOCYN) LIQD  7/15/19  Yes Historical Provider, MD   Acetaminophen (TYLENOL ARTHRITIS PAIN PO) Take 650 mg by mouth 3 times daily 2 tabs   Yes Historical Provider, MD   Biotin (BIOTIN 5000) 5 MG CAPS Take by mouth daily LD 9/28/18   Yes Historical Provider, MD   hydroxychloroquine (PLAQUENIL) 200 MG tablet   Take 200 mg by mouth daily  4/22/15  Yes Historical Provider, MD   latanoprost (XALATAN) 0.005 % ophthalmic solution Place 1 drop into both eyes nightly  6/1/15  Yes Historical Provider, MD   Lansoprazole (PREVACID PO) Take 15 mg by mouth daily Instructed to take with sip water am of procedure   Yes Historical Provider, MD   erythromycin [facility]) 5 MG/GM ophthalmic ointment  1/15/21   Historical Provider, MD   COMBIGAN 0.2-0.5 % ophthalmic solution Place 1 drop into both eyes every 12 hours Use am of surgery 6/1/15   Historical Provider, MD       Current Medications:    Current Facility-Administered Medications: sodium chloride flush 0.9 % injection 10 mL, 10 mL, Intravenous, Once  fluticasone (FLONASE) 50 MCG/ACT nasal spray 2 spray, 2 spray, Nasal, QAM AC  gabapentin (NEURONTIN) capsule 200 mg, 200 mg, Oral, TID  hydroxychloroquine (PLAQUENIL) tablet 200 mg, 200 mg, Oral, Daily  latanoprost (XALATAN) 0.005 % ophthalmic solution 1 drop, 1 drop, Both Eyes, Nightly  levothyroxine (SYNTHROID) tablet 125 mcg, 125 mcg, Oral, Daily  metoprolol tartrate (LOPRESSOR) tablet 25 mg, 25 mg, Oral, BID  venlafaxine (EFFEXOR XR) extended release capsule 37.5 mg, 37.5 mg, Oral, Daily  sodium chloride flush 0.9 % injection 5-40 mL, 5-40 mL, Intravenous, 2 times per day  sodium chloride flush 0.9 % injection 5-40 mL, 5-40 mL, Intravenous, PRN  0.9 % sodium chloride infusion, 25 mL, Intravenous, PRN  enoxaparin (LOVENOX) injection 40 mg, 40 mg, Subcutaneous, Daily  ondansetron (ZOFRAN-ODT) disintegrating tablet 4 mg, 4 mg, Oral, Q8H PRN **OR** ondansetron (ZOFRAN) injection 4 mg, 4 mg, Intravenous, Q6H PRN  polyethylene glycol (GLYCOLAX) packet 17 g, 17 g, Oral, Daily PRN  acetaminophen (TYLENOL) tablet 650 mg, 650 mg, Oral, Q6H PRN **OR** acetaminophen (TYLENOL) suppository 650 mg, 650 mg, Rectal, Q6H PRN  cefTRIAXone (ROCEPHIN) 1,000 mg in sterile water 10 mL IV syringe, 1,000 mg, Intravenous, Q24H  brimonidine (ALPHAGAN) 0.2 % ophthalmic solution 1 drop, 1 drop, Both Eyes, Q12H **AND** timolol (TIMOPTIC) 0.5 % ophthalmic solution 1 drop, 1 drop, Both Eyes, Q12H  pantoprazole (PROTONIX) tablet 40 mg, 40 mg, Oral, QAM AC    Allergies:  Patient has no known allergies. Social History: Non-smoker nondrinker and is       Family History:   Family History   Problem Relation Age of Onset    Arthritis Mother         rheumatoid    Osteoporosis Mother     Heart Disease Father     No Known Problems Brother        REVIEW OF SYSTEMS:     · Constitutional: Denies fatigue, fevers, chills or night sweats  · Eyes: Denies visual changes or drainage  · ENT: Denies headaches or hearing loss. No mouth sores or sore throat. No epistaxis   · Cardiovascular: Denies chest pain, pressure or palpitations. No lower extremity swelling. · Respiratory: Denies MOSES, cough, orthopnea or PND. No hemoptysis   · Gastrointestinal: Denies hematemesis or anorexia. No hematochezia or melena    · Genitourinary: Denies urgency, dysuria or hematuria. · Musculoskeletal: Denies gait disturbance, weakness or joint complaints  · Integumentary: Denies rash, hives or pruritis   · Neurological: Denies dizziness, headaches or seizures. No numbness or tingling  · Psychiatric:  history of anxiety or depression. · Endocrine: Denies temperature intolerance. No recent weight change. .  · Hematologic/Lymphatic: Denies abnormal bruising or bleeding. No swollen lymph nodes    PHYSICAL EXAM:   BP (!) 114/50   Pulse 97   Temp 98.9 °F (37.2 °C) (Oral)   Resp 16   Ht 5' 4\" (1.626 m)   Wt 165 lb (74.8 kg)   SpO2 100%   BMI 28.32 kg/m²   CONST:  Well developed, well nourished who appears of stated age. Awake, alert and cooperative. No apparent distress.    HEENT:   Head- Normocephalic, atraumatic   Eyes- Conjunctivae pink, anicteric  Throat- Oral mucosa pink and moist  Neck-  No stridor, trachea midline, no 03/08/2021    TRIG 114 03/08/2021     LIVER PROFILE:  Recent Labs     06/15/21  1353   AST 21   ALT 15   LABALBU 3.9   Impression  1. PVCs in bigeminy  in the setting of abnormal thyroid studies  2. Hypertension  3. Hypothyroidism history, on replacement/abnormal thyroid studies   4. History of anemia  5. Possible urinary tract infection    Plans  1. 2D echocardiogram to evaluate EF  2. Correct abnormal thyroid labs and if PVCs continue, will  consider a stress test  3. Continue beta-blockers  4. Supplement magnesium    Electronically signed by ORVILLE Mitchell CNP on 6/16/2021 at 8:45 AM   ______________________________________________________________________  Cardiology attending attestation:  I have independently interviewed and examined the patient. I personally reviewed pertinent laboratory values and diagnostic testing (including, if applicable, chest xray, electrocardiogram, telemetry, echocardiogram, stress testing, and coronary angiography). I have reviewed the above documentation completed by JOSE Mitchell including past medical, social, and family history and agree with the findings, assessment and plan except where noted. Plan formulated under my direct supervision. I participated in all aspects of the medical decision making. Please see my additional contributions to the HPI, physical exam, and assessment / medical decision making:  _______________________________________________________________________    Denies syncope. Denies chest pain, shortness of breath, palpitations. She was to go home. Physical Exam:  BP (!) 114/50   Pulse 97   Temp 98.9 °F (37.2 °C) (Oral)   Resp 16   Ht 5' 4\" (1.626 m)   Wt 165 lb (74.8 kg)   SpO2 100%   BMI 28.32 kg/m²   General appearance: Awake, alert, no acute respiratory distress  Skin: Intact, no rash  ENMT: Moist mucous membranes  Neck: Supple, no carotid bruits.   Normal jugular venous pressure  Lungs: Clear to auscultation bilaterally. No wheezes, rales, or rhonchi  Cardiac: Regular rhythm with a normal rate and frequent ectopy, normal S1&S2, no murmurs apparent  Abdomen: Soft, positive bowel sounds, nontender  Extremities: Moves all extremities x 4, no lower extremity edema  Neurologic: No focal motor deficits apparent, normal mood and affect    Telemetry: Sinus rhythm with bigeminy. No sustained arrhythmias   EKG: Sinus bigeminy    Impression:   1. Bigeminy  2. Denies syncope    Recommendations:     Check echo, if LV function is normal okay for discharge   Fix the thyroid    Thank you for the consultation. Please do not hesitate to call with questions.     Camila Berumen MD, [facility] Cardiology Universal Safety Interventions

## 2024-06-12 ENCOUNTER — OFFICE VISIT (OUTPATIENT)
Dept: GERIATRIC MEDICINE | Age: 79
End: 2024-06-12
Payer: MEDICARE

## 2024-06-12 VITALS
DIASTOLIC BLOOD PRESSURE: 78 MMHG | WEIGHT: 135.6 LBS | BODY MASS INDEX: 24.8 KG/M2 | SYSTOLIC BLOOD PRESSURE: 126 MMHG | HEART RATE: 60 BPM | TEMPERATURE: 98.1 F | RESPIRATION RATE: 16 BRPM

## 2024-06-12 DIAGNOSIS — G31.84 MCI (MILD COGNITIVE IMPAIRMENT): Primary | ICD-10-CM

## 2024-06-12 PROCEDURE — 1123F ACP DISCUSS/DSCN MKR DOCD: CPT | Performed by: INTERNAL MEDICINE

## 2024-06-12 PROCEDURE — 3078F DIAST BP <80 MM HG: CPT | Performed by: INTERNAL MEDICINE

## 2024-06-12 PROCEDURE — 3074F SYST BP LT 130 MM HG: CPT | Performed by: INTERNAL MEDICINE

## 2024-06-12 PROCEDURE — 99213 OFFICE O/P EST LOW 20 MIN: CPT | Performed by: INTERNAL MEDICINE

## 2024-06-12 NOTE — PROGRESS NOTES
Chief Complaint   Patient presents with    6 Month Follow-Up     December follow up.  Here alone.  Pt states she still works as a  -- 4 days a week.      Night sweats     Pt states she has had intermittent night sweats x 6 months.     Dr Black notified of above.

## 2024-06-12 NOTE — PROGRESS NOTES
CC Here for Memory    Graduate of [employer]  And Depression better on Wellbutrin  mg a day  Still driving got lost coming here but found her way  Essential tremor for years   SS on higher Plaqenil 300 mg a day  Worry about fingers doing jobs   Drops things a lot  OFF NEURONTIN  Remembered Roby Kori story  Has RA symptoms   Enjoys gardening and grass work  SS on meds  ADL independent   78 and  and on tennis courts 4 to 6 hours a day    Impression: MCI stable   Plan; Continue to observe

## 2024-06-22 NOTE — TELEPHONE ENCOUNTER
Patients last appointment 4/24/2024.  Patients next scheduled appointment   Future Appointments   Date Time Provider Department Center   8/14/2024 10:00 AM Papito Foss DO BDM [facility]   10/23/2024  9:45 AM Олег Rain DO [facility]   12/18/2024  1:15 PM Blake Black MD [facility]   3/27/2025  9:00 AM [facility] INF CLINIC  6 SEB Inf [facility]

## 2024-06-24 RX ORDER — BUPROPION HYDROCHLORIDE 300 MG/1
300 TABLET ORAL EVERY MORNING
Qty: 90 TABLET | Refills: 3 | Status: SHIPPED | OUTPATIENT
Start: 2024-06-24

## 2024-07-15 DIAGNOSIS — M35.09 SJOGREN'S SYNDROME WITH OTHER ORGAN INVOLVEMENT (HCC): ICD-10-CM

## 2024-07-15 RX ORDER — HYDROXYCHLOROQUINE SULFATE 200 MG/1
TABLET, FILM COATED ORAL
Qty: 135 TABLET | Refills: 3 | Status: SHIPPED | OUTPATIENT
Start: 2024-07-15

## 2024-08-06 DIAGNOSIS — J30.2 SEASONAL ALLERGIC RHINITIS, UNSPECIFIED TRIGGER: ICD-10-CM

## 2024-08-07 NOTE — TELEPHONE ENCOUNTER
Patients last appointment 4/24/2024.  Patients next scheduled appointment   Future Appointments   Date Time Provider Department Center   8/14/2024 10:00 AM Papito Foss DO BDM [facility]   10/23/2024  9:45 AM Олег Rain DO [facility]   12/18/2024  1:15 PM [facility]   3/27/2025  9:00 AM SEB INF CLINIC  6 SEB Inf [facility]

## 2024-08-08 RX ORDER — LEVOTHYROXINE SODIUM 112 UG/1
112 TABLET ORAL DAILY
Qty: 90 TABLET | Refills: 5 | Status: SHIPPED | OUTPATIENT
Start: 2024-08-08

## 2024-08-08 RX ORDER — FLUTICASONE PROPIONATE 50 MCG
2 SPRAY, SUSPENSION (ML) NASAL
Qty: 16 G | Refills: 12 | Status: SHIPPED | OUTPATIENT
Start: 2024-08-08

## 2024-08-08 RX ORDER — FERROUS SULFATE 325(65) MG
325 TABLET ORAL 2 TIMES DAILY
Qty: 180 TABLET | Refills: 5 | Status: SHIPPED | OUTPATIENT
Start: 2024-08-08

## 2024-08-11 DIAGNOSIS — M35.09 SJOGREN'S SYNDROME WITH OTHER ORGAN INVOLVEMENT (HCC): ICD-10-CM

## 2024-08-12 RX ORDER — CEVIMELINE HYDROCHLORIDE 30 MG/1
30 CAPSULE ORAL 3 TIMES DAILY
Qty: 90 CAPSULE | Refills: 5 | Status: SHIPPED | OUTPATIENT
Start: 2024-08-12

## 2024-09-09 RX ORDER — IBUPROFEN 800 MG/1
800 TABLET, FILM COATED ORAL 2 TIMES DAILY PRN
Qty: 60 TABLET | Refills: 5 | Status: SHIPPED | OUTPATIENT
Start: 2024-09-09

## 2024-10-23 DIAGNOSIS — E03.9 ACQUIRED HYPOTHYROIDISM: ICD-10-CM

## 2024-10-23 DIAGNOSIS — E53.8 VITAMIN B 12 DEFICIENCY: ICD-10-CM

## 2024-10-23 DIAGNOSIS — I10 ESSENTIAL HYPERTENSION: Chronic | ICD-10-CM

## 2024-10-23 DIAGNOSIS — E55.9 VITAMIN D DEFICIENCY: ICD-10-CM

## 2024-10-23 DIAGNOSIS — E78.2 MIXED HYPERLIPIDEMIA: Chronic | ICD-10-CM

## 2024-10-23 LAB
ALBUMIN: 4.1 G/DL (ref 3.5–5.2)
ALP BLD-CCNC: 62 U/L (ref 35–104)
ALT SERPL-CCNC: 13 U/L (ref 0–32)
ANION GAP SERPL CALCULATED.3IONS-SCNC: 13 MMOL/L (ref 7–16)
AST SERPL-CCNC: 22 U/L (ref 0–31)
BACTERIA: ABNORMAL
BASOPHILS ABSOLUTE: 0.1 K/UL (ref 0–0.2)
BASOPHILS RELATIVE PERCENT: 1 % (ref 0–2)
BILIRUB SERPL-MCNC: 0.3 MG/DL (ref 0–1.2)
BILIRUBIN, URINE: NEGATIVE
BUN BLDV-MCNC: 20 MG/DL (ref 6–23)
CALCIUM SERPL-MCNC: 9.3 MG/DL (ref 8.6–10.2)
CHLORIDE BLD-SCNC: 106 MMOL/L (ref 98–107)
CHOLESTEROL, TOTAL: 194 MG/DL
CO2: 23 MMOL/L (ref 22–29)
COLOR, UA: YELLOW
CREAT SERPL-MCNC: 1.1 MG/DL (ref 0.5–1)
EOSINOPHILS ABSOLUTE: 0.59 K/UL (ref 0.05–0.5)
EOSINOPHILS RELATIVE PERCENT: 8 % (ref 0–6)
EPITHELIAL CELLS, UA: ABNORMAL /HPF
GFR, ESTIMATED: 49 ML/MIN/1.73M2
GLUCOSE BLD-MCNC: 94 MG/DL (ref 74–99)
GLUCOSE URINE: NEGATIVE MG/DL
HCT VFR BLD CALC: 41.6 % (ref 34–48)
HDLC SERPL-MCNC: 59 MG/DL
HEMOGLOBIN: 13.2 G/DL (ref 11.5–15.5)
IMMATURE GRANULOCYTES %: 0 % (ref 0–5)
IMMATURE GRANULOCYTES ABSOLUTE: 0.03 K/UL (ref 0–0.58)
KETONES, URINE: NEGATIVE MG/DL
LDL CHOLESTEROL: 116 MG/DL
LEUKOCYTE ESTERASE, URINE: ABNORMAL
LYMPHOCYTES ABSOLUTE: 1.39 K/UL (ref 1.5–4)
LYMPHOCYTES RELATIVE PERCENT: 19 % (ref 20–42)
MCH RBC QN AUTO: 30.3 PG (ref 26–35)
MCHC RBC AUTO-ENTMCNC: 31.7 G/DL (ref 32–34.5)
MCV RBC AUTO: 95.6 FL (ref 80–99.9)
MONOCYTES ABSOLUTE: 0.81 K/UL (ref 0.1–0.95)
MONOCYTES RELATIVE PERCENT: 11 % (ref 2–12)
NEUTROPHILS ABSOLUTE: 4.34 K/UL (ref 1.8–7.3)
NEUTROPHILS RELATIVE PERCENT: 60 % (ref 43–80)
NITRITE, URINE: NEGATIVE
PDW BLD-RTO: 13.6 % (ref 11.5–15)
PH, URINE: 5.5 (ref 5–9)
PLATELET # BLD: 241 K/UL (ref 130–450)
PMV BLD AUTO: 10.7 FL (ref 7–12)
POTASSIUM SERPL-SCNC: 4.4 MMOL/L (ref 3.5–5)
PROTEIN UA: NEGATIVE MG/DL
RBC # BLD: 4.35 M/UL (ref 3.5–5.5)
RBC UA: ABNORMAL /HPF
SODIUM BLD-SCNC: 142 MMOL/L (ref 132–146)
SPECIFIC GRAVITY UA: 1.02 (ref 1–1.03)
THYROXINE (T4): 8.7 UG/DL (ref 4.5–11.7)
TOTAL PROTEIN: 6.1 G/DL (ref 6.4–8.3)
TRIGL SERPL-MCNC: 96 MG/DL
TSH SERPL DL<=0.05 MIU/L-ACNC: 0.06 UIU/ML (ref 0.27–4.2)
TURBIDITY: CLEAR
URINE HGB: NEGATIVE
UROBILINOGEN, URINE: 0.2 EU/DL (ref 0–1)
VITAMIN B-12: 620 PG/ML (ref 211–946)
VITAMIN D 25-HYDROXY: 102.4 NG/ML (ref 30–100)
VLDLC SERPL CALC-MCNC: 19 MG/DL
WBC # BLD: 7.3 K/UL (ref 4.5–11.5)
WBC UA: ABNORMAL /HPF

## 2024-10-30 ENCOUNTER — OFFICE VISIT (OUTPATIENT)
Dept: PRIMARY CARE CLINIC | Age: 79
End: 2024-10-30
Payer: MEDICARE

## 2024-10-30 VITALS — BODY MASS INDEX: 24.69 KG/M2 | HEART RATE: 62 BPM | OXYGEN SATURATION: 98 % | TEMPERATURE: 98 F | WEIGHT: 135 LBS

## 2024-10-30 DIAGNOSIS — E78.2 MIXED HYPERLIPIDEMIA: Chronic | ICD-10-CM

## 2024-10-30 DIAGNOSIS — E03.9 ACQUIRED HYPOTHYROIDISM: Primary | Chronic | ICD-10-CM

## 2024-10-30 DIAGNOSIS — M35.09 SJOGREN'S SYNDROME WITH OTHER ORGAN INVOLVEMENT (HCC): Chronic | ICD-10-CM

## 2024-10-30 DIAGNOSIS — R73.03 PRE-DIABETES: ICD-10-CM

## 2024-10-30 DIAGNOSIS — E53.8 VITAMIN B 12 DEFICIENCY: ICD-10-CM

## 2024-10-30 DIAGNOSIS — N18.31 STAGE 3A CHRONIC KIDNEY DISEASE (HCC): ICD-10-CM

## 2024-10-30 DIAGNOSIS — E55.9 VITAMIN D DEFICIENCY: Chronic | ICD-10-CM

## 2024-10-30 PROCEDURE — 1123F ACP DISCUSS/DSCN MKR DOCD: CPT | Performed by: FAMILY MEDICINE

## 2024-10-30 PROCEDURE — 99214 OFFICE O/P EST MOD 30 MIN: CPT | Performed by: FAMILY MEDICINE

## 2024-10-30 ASSESSMENT — ENCOUNTER SYMPTOMS
EYES NEGATIVE: 1
ALLERGIC/IMMUNOLOGIC NEGATIVE: 1
RESPIRATORY NEGATIVE: 1
GASTROINTESTINAL NEGATIVE: 1
BACK PAIN: 1

## 2024-10-30 NOTE — PROGRESS NOTES
(CARDIA)     Difficulty of Paying Living Expenses: Not hard at all   Food Insecurity: No Food Insecurity (4/24/2024)    Hunger Vital Sign     Worried About Running Out of Food in the Last Year: Never true     Ran Out of Food in the Last Year: Never true   Transportation Needs: Unknown (4/24/2024)    PRAPARE - Transportation     Lack of Transportation (Medical): Not on file     Lack of Transportation (Non-Medical): No   Physical Activity: Inactive (3/7/2024)    Exercise Vital Sign     Days of Exercise per Week: 0 days     Minutes of Exercise per Session: 0 min   Stress: Not on file   Social Connections: Not on file   Intimate Partner Violence: Not on file   Housing Stability: Unknown (4/24/2024)    Housing Stability Vital Sign     Unable to Pay for Housing in the Last Year: Not on file     Number of Places Lived in the Last Year: Not on file     Unstable Housing in the Last Year: No      Past Medical History:   Diagnosis Date    ANS (arteriolar nephrosclerosis)     Anxiety 2018    Arrhythmia 1995    Atrial Fibrillation 1995, Reentrant AV elfego tachycardia, 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 SAM Agustin every year    Atrial fibrillation (HCC)     AVNRT (AV elfego re-entry tachycardia) (HCC) 2008?    s/p ablation /resolved; noted 9/28/18- had recent fast heart rate & hypertensoion & seeing Dr. Rain  9/28/18    Benign essential tremor 4/6/2022    Benign head tremor 4/6/2022    Chronic kidney disease 05/2017    Depression     Dry eyes     Dry mouth     Fatigue     GERD (gastroesophageal reflux disease)     Glaucoma     left eyes    Glaucoma     HH (hiatus hernia)     Hyperlipidemia     Hypertension     Hypothyroidism     Menopause     Osteoarthritis     Sjogren's syndrome (HCC)     Spinal stenosis     at lower back    Stool incontinence 09/2018    SVT (supraventricular tachycardia) (HCC)     Tinnitus     Tremor      Family History   Problem Relation Age of Onset    Arthritis Mother

## 2024-11-11 RX ORDER — IBUPROFEN 800 MG/1
800 TABLET, FILM COATED ORAL 2 TIMES DAILY PRN
Qty: 60 TABLET | Refills: 5 | Status: SHIPPED | OUTPATIENT
Start: 2024-11-11

## 2024-11-12 DIAGNOSIS — M35.09 SJOGREN'S SYNDROME WITH OTHER ORGAN INVOLVEMENT (HCC): ICD-10-CM

## 2024-11-12 RX ORDER — CEVIMELINE HYDROCHLORIDE 30 MG/1
30 CAPSULE ORAL 3 TIMES DAILY
Qty: 90 CAPSULE | Refills: 5 | Status: SHIPPED | OUTPATIENT
Start: 2024-11-12

## 2024-12-18 ENCOUNTER — OFFICE VISIT (OUTPATIENT)
Dept: GERIATRIC MEDICINE | Age: 79
End: 2024-12-18
Payer: MEDICARE

## 2024-12-18 VITALS
RESPIRATION RATE: 20 BRPM | HEART RATE: 64 BPM | BODY MASS INDEX: 24.33 KG/M2 | SYSTOLIC BLOOD PRESSURE: 138 MMHG | DIASTOLIC BLOOD PRESSURE: 70 MMHG | TEMPERATURE: 97.7 F | OXYGEN SATURATION: 100 % | WEIGHT: 133 LBS

## 2024-12-18 DIAGNOSIS — G31.84 MCI (MILD COGNITIVE IMPAIRMENT): Primary | ICD-10-CM

## 2024-12-18 PROCEDURE — 99213 OFFICE O/P EST LOW 20 MIN: CPT | Performed by: INTERNAL MEDICINE

## 2024-12-18 PROCEDURE — 3075F SYST BP GE 130 - 139MM HG: CPT | Performed by: INTERNAL MEDICINE

## 2024-12-18 PROCEDURE — 1160F RVW MEDS BY RX/DR IN RCRD: CPT | Performed by: INTERNAL MEDICINE

## 2024-12-18 PROCEDURE — 1159F MED LIST DOCD IN RCRD: CPT | Performed by: INTERNAL MEDICINE

## 2024-12-18 PROCEDURE — 1123F ACP DISCUSS/DSCN MKR DOCD: CPT | Performed by: INTERNAL MEDICINE

## 2024-12-18 PROCEDURE — 3078F DIAST BP <80 MM HG: CPT | Performed by: INTERNAL MEDICINE

## 2024-12-18 NOTE — PROGRESS NOTES
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

## 2024-12-18 NOTE — PROGRESS NOTES
Chief Complaint   Patient presents with    6 Month Follow-Up     June follow up.  Here with .

## 2025-01-17 RX ORDER — METOPROLOL TARTRATE 50 MG
25 TABLET ORAL 2 TIMES DAILY
Qty: 90 TABLET | Refills: 3 | Status: SHIPPED | OUTPATIENT
Start: 2025-01-17

## 2025-01-22 RX ORDER — METOPROLOL TARTRATE 50 MG
25 TABLET ORAL 2 TIMES DAILY
Qty: 90 TABLET | Refills: 3 | OUTPATIENT
Start: 2025-01-22

## 2025-01-29 ENCOUNTER — OFFICE VISIT (OUTPATIENT)
Dept: FAMILY MEDICINE CLINIC | Age: 80
End: 2025-01-29
Payer: MEDICARE

## 2025-01-29 VITALS
HEIGHT: 62 IN | TEMPERATURE: 97.6 F | BODY MASS INDEX: 25.03 KG/M2 | DIASTOLIC BLOOD PRESSURE: 80 MMHG | WEIGHT: 136 LBS | HEART RATE: 64 BPM | OXYGEN SATURATION: 99 % | SYSTOLIC BLOOD PRESSURE: 126 MMHG

## 2025-01-29 DIAGNOSIS — M25.561 ACUTE PAIN OF RIGHT KNEE: Primary | ICD-10-CM

## 2025-01-29 DIAGNOSIS — M17.11 PRIMARY OSTEOARTHRITIS OF RIGHT KNEE: ICD-10-CM

## 2025-01-29 PROCEDURE — 3074F SYST BP LT 130 MM HG: CPT | Performed by: FAMILY MEDICINE

## 2025-01-29 PROCEDURE — 1123F ACP DISCUSS/DSCN MKR DOCD: CPT | Performed by: FAMILY MEDICINE

## 2025-01-29 PROCEDURE — 99213 OFFICE O/P EST LOW 20 MIN: CPT | Performed by: FAMILY MEDICINE

## 2025-01-29 PROCEDURE — 3079F DIAST BP 80-89 MM HG: CPT | Performed by: FAMILY MEDICINE

## 2025-01-29 ASSESSMENT — ENCOUNTER SYMPTOMS
SHORTNESS OF BREATH: 0
VOMITING: 0
PHOTOPHOBIA: 0
EYE REDNESS: 0
COUGH: 0
ABDOMINAL PAIN: 0
ALLERGIC/IMMUNOLOGIC NEGATIVE: 1
NAUSEA: 0
BACK PAIN: 0
EYE DISCHARGE: 0
DIARRHEA: 0
BLOOD IN STOOL: 0
SORE THROAT: 0
TROUBLE SWALLOWING: 0
EYE PAIN: 0
CHEST TIGHTNESS: 0
SINUS PAIN: 0

## 2025-01-29 NOTE — PROGRESS NOTES
25  Lashonda Chi : 1945 Sex: female  Age: 79 y.o.      Assessment and Plan:  Lashonda \"Teresa\" was seen today for knee pain.    Diagnoses and all orders for this visit:    Acute pain of right knee  -     XR KNEE RIGHT (MIN 4 VIEWS); Future    Primary osteoarthritis of right knee    X-ray shows severe osteoarthritis of the right knee.  She is to use Voltaren gel 3 times a day to the affected area.  Will need to orthopedic referral for further treatment on this knee.  They will call themselves.  If complaints do not improve, or worsen in any way, present back to the office.    No follow-ups on file.    Chief Complaint   Patient presents with    Knee Pain     Right knee pain started . Hurts to move it.        Patient complains of right lateral knee pain.  Onset 3 days ago while sleeping.  She denies trauma or overuse.  Area is mildly swollen but denies redness or ecchymosis.  Good color, pulses, sensation, range of motion distal leg.        Review of Systems   Constitutional:  Negative for appetite change, fatigue and unexpected weight change.   HENT:  Negative for congestion, ear pain, hearing loss, sinus pain, sore throat and trouble swallowing.    Eyes:  Negative for photophobia, pain, discharge and redness.   Respiratory:  Negative for cough, chest tightness and shortness of breath.    Cardiovascular:  Negative for chest pain, palpitations and leg swelling.   Gastrointestinal:  Negative for abdominal pain, blood in stool, diarrhea, nausea and vomiting.   Endocrine: Negative.    Genitourinary:  Negative for dysuria, flank pain, frequency and hematuria.   Musculoskeletal:  Positive for arthralgias, gait problem and joint swelling. Negative for back pain and myalgias.        Right knee   Skin: Negative.    Allergic/Immunologic: Negative.    Neurological:  Negative for dizziness, seizures, syncope, weakness, light-headedness, numbness and headaches.   Hematological:  Negative for adenopathy. Does

## 2025-02-07 ENCOUNTER — OFFICE VISIT (OUTPATIENT)
Dept: PRIMARY CARE CLINIC | Age: 80
End: 2025-02-07

## 2025-02-07 VITALS
TEMPERATURE: 97.9 F | HEART RATE: 64 BPM | SYSTOLIC BLOOD PRESSURE: 128 MMHG | WEIGHT: 136 LBS | HEIGHT: 61 IN | DIASTOLIC BLOOD PRESSURE: 83 MMHG | OXYGEN SATURATION: 93 % | BODY MASS INDEX: 25.68 KG/M2 | RESPIRATION RATE: 16 BRPM

## 2025-02-07 DIAGNOSIS — Z00.00 MEDICARE ANNUAL WELLNESS VISIT, SUBSEQUENT: Primary | ICD-10-CM

## 2025-02-07 DIAGNOSIS — M17.11 PRIMARY OSTEOARTHRITIS OF RIGHT KNEE: ICD-10-CM

## 2025-02-07 SDOH — ECONOMIC STABILITY: FOOD INSECURITY: WITHIN THE PAST 12 MONTHS, THE FOOD YOU BOUGHT JUST DIDN'T LAST AND YOU DIDN'T HAVE MONEY TO GET MORE.: NEVER TRUE

## 2025-02-07 SDOH — ECONOMIC STABILITY: FOOD INSECURITY: WITHIN THE PAST 12 MONTHS, YOU WORRIED THAT YOUR FOOD WOULD RUN OUT BEFORE YOU GOT MONEY TO BUY MORE.: NEVER TRUE

## 2025-02-07 ASSESSMENT — PATIENT HEALTH QUESTIONNAIRE - PHQ9
SUM OF ALL RESPONSES TO PHQ QUESTIONS 1-9: 0
1. LITTLE INTEREST OR PLEASURE IN DOING THINGS: NOT AT ALL
SUM OF ALL RESPONSES TO PHQ9 QUESTIONS 1 & 2: 0
SUM OF ALL RESPONSES TO PHQ QUESTIONS 1-9: 0
2. FEELING DOWN, DEPRESSED OR HOPELESS: NOT AT ALL

## 2025-02-07 ASSESSMENT — LIFESTYLE VARIABLES: HOW MANY STANDARD DRINKS CONTAINING ALCOHOL DO YOU HAVE ON A TYPICAL DAY: PATIENT DOES NOT DRINK

## 2025-02-07 NOTE — PROGRESS NOTES
Medicare Annual Wellness Visit    Lashonda Chi is here for Knee Pain (Right) and Medicare AWV    Assessment & Plan   Medicare annual wellness visit, subsequent  Primary osteoarthritis of right knee       Return for Medicare Annual Wellness Visit in 1 year.     Subjective   The following acute and/or chronic problems were also addressed today:  Emmanuelle bryna pain  came to [facility]  and she   has appt with dr jane    She chg  mtroprol to   am  pm and  feel like shagin went awy    To  dr edwards for [facility] Risk Assessment and screening values have been reviewed and are found in Flowsheets. The following problems were reviewed today and where indicated follow up appointments were made and/or referrals ordered.    Positive Risk Factor Screenings with Interventions:              Inactivity:  On average, how many days per week do you engage in moderate to strenuous exercise (like a brisk walk)?: 0 days (!) Abnormal  On average, how many minutes do you engage in exercise at this level?: 0 min  Interventions:  Patient declined any further interventions or treatment             LDCT Screening: Discussed with patient the benefits and harms of screening, follow-up diagnostic testing, over-diagnosis, false positive rate, and total radiation exposure. Counseled on the importance of adherence to annual lung cancer LDCT screening, impact of comorbidities, ability and willingness to undergo diagnosis and treatment. Counseled on the importance of maintaining cigarette smoking abstinence and cessation. The patient has a history of >20 pack years and is either still smoking or quit within the last 15 years. There are no signs or symptoms of lung cancer.            Objective   Vitals:    02/07/25 0705   BP: 128/83   Pulse: 64   Resp: 16   Temp: 97.9 °F (36.6 °C)   TempSrc: Temporal   SpO2: 93%   Weight: 61.7 kg (136 lb)   Height: 1.549 m (5' 1\")      Body mass index is 25.7 kg/m².      General Appearance:

## 2025-02-14 RX ORDER — IBUPROFEN 800 MG/1
800 TABLET, FILM COATED ORAL 2 TIMES DAILY PRN
Qty: 60 TABLET | Refills: 5 | Status: SHIPPED | OUTPATIENT
Start: 2025-02-14

## 2025-03-05 ENCOUNTER — OFFICE VISIT (OUTPATIENT)
Dept: RHEUMATOLOGY | Age: 80
End: 2025-03-05
Payer: MEDICARE

## 2025-03-05 VITALS
SYSTOLIC BLOOD PRESSURE: 150 MMHG | HEART RATE: 74 BPM | HEIGHT: 62 IN | BODY MASS INDEX: 24.48 KG/M2 | DIASTOLIC BLOOD PRESSURE: 82 MMHG | OXYGEN SATURATION: 98 % | WEIGHT: 133 LBS

## 2025-03-05 DIAGNOSIS — M15.9 GENERALIZED OSTEOARTHRITIS: ICD-10-CM

## 2025-03-05 DIAGNOSIS — M85.89 OSTEOPENIA OF MULTIPLE SITES: ICD-10-CM

## 2025-03-05 DIAGNOSIS — Z79.899 HIGH RISK MEDICATION USE: ICD-10-CM

## 2025-03-05 DIAGNOSIS — M35.01 SJOGREN'S SYNDROME WITH KERATOCONJUNCTIVITIS SICCA: Primary | ICD-10-CM

## 2025-03-05 PROCEDURE — 99214 OFFICE O/P EST MOD 30 MIN: CPT | Performed by: INTERNAL MEDICINE

## 2025-03-05 PROCEDURE — 3077F SYST BP >= 140 MM HG: CPT | Performed by: INTERNAL MEDICINE

## 2025-03-05 PROCEDURE — G2211 COMPLEX E/M VISIT ADD ON: HCPCS | Performed by: INTERNAL MEDICINE

## 2025-03-05 PROCEDURE — 1123F ACP DISCUSS/DSCN MKR DOCD: CPT | Performed by: INTERNAL MEDICINE

## 2025-03-05 PROCEDURE — 3079F DIAST BP 80-89 MM HG: CPT | Performed by: INTERNAL MEDICINE

## 2025-03-05 ASSESSMENT — ENCOUNTER SYMPTOMS
SHORTNESS OF BREATH: 0
VOMITING: 0
COUGH: 0
TROUBLE SWALLOWING: 0
ABDOMINAL PAIN: 0
DIARRHEA: 0
COLOR CHANGE: 0
NAUSEA: 0

## 2025-03-05 NOTE — PATIENT INSTRUCTIONS
No medication changes    Have labs    Have bone density scan before next Reclast      Dr. Foss has ordered a radiology test for you such as \"Bone Density Scan, MRI, CT, etc\". The [facility] Radiology Scheduling Department should contact you within 1-2 weeks to schedule your appointment. If you do not hear from the scheduling department, please contact them at [phone]. If any questions, please call Dr. Foss's office if needed at [phone]. Thank you.

## 2025-03-05 NOTE — PROGRESS NOTES
The patient (or guardian, if applicable) and other individuals in attendance with the patient were advised that Artificial Intelligence will be utilized during this visit to record, process the conversation to generate a clinical note, and support improvement of the AI technology. The patient (or guardian, if applicable) and other individuals in attendance at the appointment consented to the use of AI, including the recording.                  Lashonda Chi 1945 is a 79 y.o. female, here for evaluation of the following chief complaint(s):  Follow-up (Patient here for follow up on Sjogrens. )      Assessment & Plan   ASSESSMENT/PLAN:    Lashonda Chi 1945 is a 79 y.o. female seen in follow-up for Sjogren's syndrome.    1.  Sjogren's syndrome-manifest as positive SSA and SSB, sicca symptoms, mild inflammatory arthritis.  Doing fairly well on Plaquenil 300 mg daily.  As far as sicca symptoms her dry eyes are manageable with artificial tears and Systane.  As far as dry mouth she has found cevimeline to be more beneficial than pilocarpine, but the cevimeline is expensive.  She does have a persistently palpable, mobile, nontender left cervical lymph node.  Ultrasound was nonsuspicious looking.  I see no other signs of extraglandular manifestations.  I would not make any changes.  We will update blood work as below.    2.  High risk medication use-she follows with the eye doctor regularly while on Plaquenil.    3.  Osteoarthritis-she can continue Tylenol and Advil on a as needed basis.    4.  Osteopenia-with high FRAX on bone density scan from February 2023.  She has had 2 doses of Reclast and is due again in March.  We will update her bone density scan prior to her next dose of Reclast.  She is on vitamin D supplementation.    1. Sjogren's syndrome with keratoconjunctivitis sicca  -     CBC with Auto Differential; Future  -     Comprehensive Metabolic Panel; Future  -     Rheumatoid Factor; Future  -

## 2025-03-10 RX ORDER — IBUPROFEN 800 MG/1
800 TABLET, FILM COATED ORAL 2 TIMES DAILY PRN
Qty: 60 TABLET | Refills: 5 | OUTPATIENT
Start: 2025-03-10

## 2025-03-12 DIAGNOSIS — M35.01 SJOGREN'S SYNDROME WITH KERATOCONJUNCTIVITIS SICCA: ICD-10-CM

## 2025-03-12 DIAGNOSIS — Z79.899 HIGH RISK MEDICATION USE: ICD-10-CM

## 2025-03-12 LAB
ALBUMIN: 4.2 G/DL (ref 3.5–5.2)
ALP BLD-CCNC: 66 U/L (ref 35–104)
ALT SERPL-CCNC: 16 U/L (ref 0–32)
ANION GAP SERPL CALCULATED.3IONS-SCNC: 13 MMOL/L (ref 7–16)
AST SERPL-CCNC: 25 U/L (ref 0–31)
BASOPHILS ABSOLUTE: 0.13 K/UL (ref 0–0.2)
BASOPHILS RELATIVE PERCENT: 2 % (ref 0–2)
BILIRUB SERPL-MCNC: 0.4 MG/DL (ref 0–1.2)
BILIRUBIN, URINE: NEGATIVE
BUN BLDV-MCNC: 21 MG/DL (ref 6–23)
C-REACTIVE PROTEIN: <3 MG/L (ref 0–5)
CALCIUM SERPL-MCNC: 9.3 MG/DL (ref 8.6–10.2)
CHLORIDE BLD-SCNC: 109 MMOL/L (ref 98–107)
CO2: 21 MMOL/L (ref 22–29)
COLOR, UA: YELLOW
COMMENT: NORMAL
CREAT SERPL-MCNC: 1.1 MG/DL (ref 0.5–1)
EOSINOPHILS ABSOLUTE: 0.66 K/UL (ref 0.05–0.5)
EOSINOPHILS RELATIVE PERCENT: 9 % (ref 0–6)
GFR, ESTIMATED: 53 ML/MIN/1.73M2
GLUCOSE BLD-MCNC: 88 MG/DL (ref 74–99)
GLUCOSE URINE: NEGATIVE MG/DL
HCT VFR BLD CALC: 44 % (ref 34–48)
HEMOGLOBIN: 13.8 G/DL (ref 11.5–15.5)
IMMATURE GRANULOCYTES %: 0 % (ref 0–5)
IMMATURE GRANULOCYTES ABSOLUTE: 0.03 K/UL (ref 0–0.58)
KETONES, URINE: NEGATIVE MG/DL
LEUKOCYTE ESTERASE, URINE: NEGATIVE
LYMPHOCYTES ABSOLUTE: 1.25 K/UL (ref 1.5–4)
LYMPHOCYTES RELATIVE PERCENT: 17 % (ref 20–42)
MCH RBC QN AUTO: 29.8 PG (ref 26–35)
MCHC RBC AUTO-ENTMCNC: 31.4 G/DL (ref 32–34.5)
MCV RBC AUTO: 95 FL (ref 80–99.9)
MONOCYTES ABSOLUTE: 0.68 K/UL (ref 0.1–0.95)
MONOCYTES RELATIVE PERCENT: 9 % (ref 2–12)
NEUTROPHILS ABSOLUTE: 4.6 K/UL (ref 1.8–7.3)
NEUTROPHILS RELATIVE PERCENT: 63 % (ref 43–80)
NITRITE, URINE: NEGATIVE
PDW BLD-RTO: 13.2 % (ref 11.5–15)
PH, URINE: 5.5 (ref 5–8)
PLATELET # BLD: 227 K/UL (ref 130–450)
PMV BLD AUTO: 10.7 FL (ref 7–12)
POTASSIUM SERPL-SCNC: 4.6 MMOL/L (ref 3.5–5)
PROTEIN UA: NEGATIVE MG/DL
RBC # BLD: 4.63 M/UL (ref 3.5–5.5)
RHEUMATOID FACTOR: <10 IU/ML (ref 0–13)
SED RATE, AUTOMATED: 1 MM/HR (ref 0–20)
SODIUM BLD-SCNC: 143 MMOL/L (ref 132–146)
SPECIFIC GRAVITY UA: 1.01 (ref 1–1.03)
TOTAL PROTEIN: 6.5 G/DL (ref 6.4–8.3)
TURBIDITY: CLEAR
URINE HGB: NEGATIVE
UROBILINOGEN, URINE: 0.2 EU/DL (ref 0–1)
WBC # BLD: 7.4 K/UL (ref 4.5–11.5)

## 2025-03-14 LAB
ALBUMIN (CALCULATED): 3.4 G/DL (ref 3.5–4.7)
ALPHA-1-GLOBULIN: 0.3 G/DL (ref 0.2–0.4)
ALPHA-2-GLOBULIN: 0.9 G/DL (ref 0.5–1)
BETA GLOBULIN: 1 G/DL (ref 0.8–1.3)
GAMMA GLOBULIN: 0.8 G/DL (ref 0.7–1.6)
PATHOLOGIST: ABNORMAL
PROTEIN ELECTROPHORESIS, SERUM: ABNORMAL
TOTAL PROTEIN: 6.4 G/DL (ref 6.4–8.3)

## 2025-03-20 ENCOUNTER — HOSPITAL ENCOUNTER (OUTPATIENT)
Dept: GENERAL RADIOLOGY | Age: 80
Discharge: HOME | End: 2025-03-22
Payer: MEDICARE

## 2025-03-20 DIAGNOSIS — M85.89 OSTEOPENIA OF MULTIPLE SITES: ICD-10-CM

## 2025-03-20 PROCEDURE — 77080 DXA BONE DENSITY AXIAL: CPT

## 2025-03-20 NOTE — PROGRESS NOTES
Called to Pharmacist Tenisha to have creatinine clearance calculated. Patient is 79 years old, height 5'2\", weight 133 lb , gender female, creatinine level 1.1 mg/dL. Pharmacist did calculation and I was told creatinine clearance is 40 ml/min.

## 2025-03-24 ENCOUNTER — TELEPHONE (OUTPATIENT)
Dept: RHEUMATOLOGY | Age: 80
End: 2025-03-24

## 2025-03-24 NOTE — TELEPHONE ENCOUNTER
[facility] called, patient is coming in on Thursday and needs a new Reclast order put in.     Please review and advise.    Electronically signed by Carl Cunha MA on 3/24/2025 at 11:23 AM

## 2025-03-25 NOTE — TELEPHONE ENCOUNTER
Reclast order faxed to [facility].    Electronically signed by Carl Cunha MA on 3/25/2025 at 1:35 PM

## 2025-03-26 RX ORDER — ZOLEDRONIC ACID 0.05 MG/ML
5 INJECTION, SOLUTION INTRAVENOUS ONCE
Status: CANCELLED | OUTPATIENT
Start: 2025-03-26 | End: 2025-03-26

## 2025-03-27 ENCOUNTER — HOSPITAL ENCOUNTER (OUTPATIENT)
Dept: INFUSION THERAPY | Age: 80
Setting detail: INFUSION SERIES
Discharge: HOME | End: 2025-03-27
Payer: MEDICARE

## 2025-03-27 VITALS
OXYGEN SATURATION: 100 % | BODY MASS INDEX: 24.33 KG/M2 | WEIGHT: 133 LBS | TEMPERATURE: 97.5 F | SYSTOLIC BLOOD PRESSURE: 131 MMHG | RESPIRATION RATE: 16 BRPM | HEART RATE: 47 BPM | DIASTOLIC BLOOD PRESSURE: 84 MMHG

## 2025-03-27 DIAGNOSIS — M81.0 AGE-RELATED OSTEOPOROSIS WITHOUT CURRENT PATHOLOGICAL FRACTURE: Primary | ICD-10-CM

## 2025-03-27 DIAGNOSIS — M85.89 OSTEOPENIA OF MULTIPLE SITES: ICD-10-CM

## 2025-03-27 PROCEDURE — 2500000003 HC RX 250 WO HCPCS: Performed by: INTERNAL MEDICINE

## 2025-03-27 PROCEDURE — 6360000002 HC RX W HCPCS: Performed by: INTERNAL MEDICINE

## 2025-03-27 PROCEDURE — 96365 THER/PROPH/DIAG IV INF INIT: CPT

## 2025-03-27 RX ORDER — EPINEPHRINE 1 MG/ML
0.3 INJECTION, SOLUTION, CONCENTRATE INTRAVENOUS PRN
OUTPATIENT
Start: 2025-03-27

## 2025-03-27 RX ORDER — SODIUM CHLORIDE 9 MG/ML
5-250 INJECTION, SOLUTION INTRAVENOUS PRN
OUTPATIENT
Start: 2025-03-27

## 2025-03-27 RX ORDER — ZOLEDRONIC ACID 0.05 MG/ML
5 INJECTION, SOLUTION INTRAVENOUS ONCE
Status: CANCELLED | OUTPATIENT
Start: 2025-03-27 | End: 2025-03-27

## 2025-03-27 RX ORDER — ACETAMINOPHEN 325 MG/1
650 TABLET ORAL
OUTPATIENT
Start: 2025-03-27

## 2025-03-27 RX ORDER — ZOLEDRONIC ACID 0.05 MG/ML
5 INJECTION, SOLUTION INTRAVENOUS ONCE
Status: COMPLETED | OUTPATIENT
Start: 2025-03-27 | End: 2025-03-27

## 2025-03-27 RX ORDER — DIPHENHYDRAMINE HYDROCHLORIDE 50 MG/ML
50 INJECTION, SOLUTION INTRAMUSCULAR; INTRAVENOUS
OUTPATIENT
Start: 2025-03-27

## 2025-03-27 RX ORDER — SODIUM CHLORIDE 9 MG/ML
INJECTION, SOLUTION INTRAVENOUS CONTINUOUS
OUTPATIENT
Start: 2025-03-27

## 2025-03-27 RX ORDER — SODIUM CHLORIDE 0.9 % (FLUSH) 0.9 %
5-40 SYRINGE (ML) INJECTION PRN
OUTPATIENT
Start: 2025-03-27

## 2025-03-27 RX ORDER — HEPARIN 100 UNIT/ML
500 SYRINGE INTRAVENOUS PRN
OUTPATIENT
Start: 2025-03-27

## 2025-03-27 RX ORDER — ALBUTEROL SULFATE 90 UG/1
4 INHALANT RESPIRATORY (INHALATION) PRN
OUTPATIENT
Start: 2025-03-27

## 2025-03-27 RX ORDER — ONDANSETRON 2 MG/ML
8 INJECTION INTRAMUSCULAR; INTRAVENOUS
OUTPATIENT
Start: 2025-03-27

## 2025-03-27 RX ORDER — SODIUM CHLORIDE 0.9 % (FLUSH) 0.9 %
5-40 SYRINGE (ML) INJECTION PRN
Status: DISCONTINUED | OUTPATIENT
Start: 2025-03-27 | End: 2025-03-28 | Stop reason: HOSPADM

## 2025-03-27 RX ORDER — HYDROCORTISONE SODIUM SUCCINATE 100 MG/2ML
100 INJECTION INTRAMUSCULAR; INTRAVENOUS
OUTPATIENT
Start: 2025-03-27

## 2025-03-27 RX ADMIN — SODIUM CHLORIDE, PRESERVATIVE FREE 10 ML: 5 INJECTION INTRAVENOUS at 09:29

## 2025-03-27 RX ADMIN — SODIUM CHLORIDE, PRESERVATIVE FREE 10 ML: 5 INJECTION INTRAVENOUS at 09:30

## 2025-03-27 RX ADMIN — ZOLEDRONIC ACID 5 MG: 5 INJECTION INTRAVENOUS at 09:10

## 2025-03-27 ASSESSMENT — PAIN DESCRIPTION - FREQUENCY: FREQUENCY: INTERMITTENT

## 2025-03-27 ASSESSMENT — PAIN DESCRIPTION - ONSET: ONSET: ON-GOING

## 2025-03-27 ASSESSMENT — PAIN DESCRIPTION - DESCRIPTORS: DESCRIPTORS: ACHING

## 2025-03-27 ASSESSMENT — PAIN DESCRIPTION - PAIN TYPE: TYPE: CHRONIC PAIN

## 2025-03-27 ASSESSMENT — PAIN DESCRIPTION - ORIENTATION: ORIENTATION: RIGHT;LEFT

## 2025-03-27 ASSESSMENT — PAIN DESCRIPTION - LOCATION: LOCATION: BACK

## 2025-03-27 NOTE — PROGRESS NOTES
Before infusion    RECLAST patient declined any infections, open wounds, or change in medical condition. Tolerated infusion well.  Reviewed therapy plan, offered education material and/or discharge material, reviewed medication information and signs and symptoms  and educated on possible side effects, verbalizes good knowledge of current plan patient verbalizes understanding, and has no signs or symptoms to report at this time. Patient discharged. Patient alert and oriented x3.   No distress noted.   Vital signs stable.   Patient denies any new or worsening pain.  Patient denies any needs.  All questions answered.  Next appointment scheduled.

## 2025-04-02 ENCOUNTER — OFFICE VISIT (OUTPATIENT)
Dept: CARDIOLOGY CLINIC | Age: 80
End: 2025-04-02
Payer: MEDICARE

## 2025-04-02 VITALS
SYSTOLIC BLOOD PRESSURE: 114 MMHG | HEIGHT: 62 IN | RESPIRATION RATE: 18 BRPM | HEART RATE: 47 BPM | BODY MASS INDEX: 25.1 KG/M2 | DIASTOLIC BLOOD PRESSURE: 60 MMHG | OXYGEN SATURATION: 100 % | TEMPERATURE: 97 F | WEIGHT: 136.4 LBS

## 2025-04-02 DIAGNOSIS — I10 ESSENTIAL HYPERTENSION: Primary | ICD-10-CM

## 2025-04-02 DIAGNOSIS — R00.2 PALPITATIONS: ICD-10-CM

## 2025-04-02 PROCEDURE — 99214 OFFICE O/P EST MOD 30 MIN: CPT | Performed by: INTERNAL MEDICINE

## 2025-04-02 PROCEDURE — G2211 COMPLEX E/M VISIT ADD ON: HCPCS | Performed by: INTERNAL MEDICINE

## 2025-04-02 PROCEDURE — 1123F ACP DISCUSS/DSCN MKR DOCD: CPT | Performed by: INTERNAL MEDICINE

## 2025-04-02 PROCEDURE — 1159F MED LIST DOCD IN RCRD: CPT | Performed by: INTERNAL MEDICINE

## 2025-04-02 PROCEDURE — 93000 ELECTROCARDIOGRAM COMPLETE: CPT | Performed by: INTERNAL MEDICINE

## 2025-04-02 PROCEDURE — 3074F SYST BP LT 130 MM HG: CPT | Performed by: INTERNAL MEDICINE

## 2025-04-02 PROCEDURE — 3078F DIAST BP <80 MM HG: CPT | Performed by: INTERNAL MEDICINE

## 2025-04-02 NOTE — PROGRESS NOTES
CHIEF COMPLAINT: Arrhythmia and Bigeminy    HPI: Patient is a 79 y.o. female seen at the request of Олег Rain DO.      Patient with history of arrhythmia s/p AVNRT ablation presenting in FU for ventricular ectopy.      Patient denies any chest pain or anginal symptoms. No SOB.    No palpitations. No syncope or near-syncope.     Past Medical History:   Diagnosis Date    ANS (arteriolar nephrosclerosis)     Anxiety 2018    Arrhythmia 1995    Atrial Fibrillation 1995, Reentrant AV elfego tachycardia, 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 SAM Agustin every year    Atrial fibrillation (HCC)     AVNRT (AV elfego re-entry tachycardia) 2008?    s/p ablation /resolved; noted 9/28/18- had recent fast heart rate & hypertensoion & seeing Dr. Rain  9/28/18    Benign essential tremor 4/6/2022    Benign head tremor 4/6/2022    Chronic kidney disease 05/2017    Depression     Dry eyes     Dry mouth     Fatigue     GERD (gastroesophageal reflux disease)     Glaucoma     left eyes    Glaucoma     HH (hiatus hernia)     Hyperlipidemia     Hypertension     Hypothyroidism     Menopause     Osteoarthritis     Sjogren's syndrome     Spinal stenosis     at lower back    Stool incontinence 09/2018    SVT (supraventricular tachycardia)     Tinnitus     Tremor        Patient Active Problem List   Diagnosis    Arrhythmia    Sjogren's disease    Acquired hypothyroidism    AVNRT (AV elfego re-entry tachycardia) (HCC)    Primary osteoarthritis of left knee    Status post left knee replacement    Age-related osteoporosis without current pathological fracture    Essential hypertension    Mixed hyperlipidemia    Gastroesophageal reflux disease without esophagitis    Vitamin D deficiency    Generalized osteoarthritis    Pre-diabetes    Syncope and collapse    Bigeminy    Absolute anemia    Mixed stress and urge urinary incontinence    Tremor    Stage 3a chronic kidney disease (HCC)    Benign essential tremor    Benign

## 2025-04-09 LAB — MAMMOGRAPHY, EXTERNAL: NORMAL

## 2025-04-10 ENCOUNTER — TELEPHONE (OUTPATIENT)
Dept: CARDIOLOGY CLINIC | Age: 80
End: 2025-04-10

## 2025-04-10 NOTE — TELEPHONE ENCOUNTER
Patient was seen in office last week and she needs cardiac clearance for a knee surgery scheduled on 5/21 with , please advise

## 2025-04-10 NOTE — TELEPHONE ENCOUNTER
Patient is an acceptable risk to proceed with surgery.    Blanca Agustin D.O.  Cardiologist  Cardiology, [facility]

## 2025-04-15 RX ORDER — LEVOTHYROXINE SODIUM 112 UG/1
112 TABLET ORAL DAILY
Qty: 90 TABLET | Refills: 5 | Status: SHIPPED | OUTPATIENT
Start: 2025-04-15

## 2025-04-29 DIAGNOSIS — N18.31 STAGE 3A CHRONIC KIDNEY DISEASE (HCC): ICD-10-CM

## 2025-04-29 DIAGNOSIS — E55.9 VITAMIN D DEFICIENCY: Chronic | ICD-10-CM

## 2025-04-29 DIAGNOSIS — R73.03 PRE-DIABETES: ICD-10-CM

## 2025-04-29 DIAGNOSIS — E53.8 VITAMIN B 12 DEFICIENCY: ICD-10-CM

## 2025-04-29 DIAGNOSIS — E03.9 ACQUIRED HYPOTHYROIDISM: Chronic | ICD-10-CM

## 2025-04-29 DIAGNOSIS — E78.2 MIXED HYPERLIPIDEMIA: Chronic | ICD-10-CM

## 2025-04-29 LAB
ALBUMIN: 3.7 G/DL (ref 3.5–5.2)
ALP BLD-CCNC: 62 U/L (ref 35–104)
ALT SERPL-CCNC: 21 U/L (ref 0–35)
ANION GAP SERPL CALCULATED.3IONS-SCNC: 10 MMOL/L (ref 7–16)
AST SERPL-CCNC: 32 U/L (ref 0–35)
BACTERIA: ABNORMAL
BASOPHILS ABSOLUTE: 0.11 K/UL (ref 0–0.2)
BASOPHILS RELATIVE PERCENT: 2 % (ref 0–2)
BILIRUB SERPL-MCNC: 0.2 MG/DL (ref 0–1.2)
BILIRUBIN, URINE: NEGATIVE
BUN BLDV-MCNC: 25 MG/DL (ref 8–23)
CALCIUM SERPL-MCNC: 9 MG/DL (ref 8.8–10.2)
CHLORIDE BLD-SCNC: 112 MMOL/L (ref 98–107)
CHOLESTEROL, TOTAL: 181 MG/DL
CO2: 21 MMOL/L (ref 22–29)
COLOR, UA: YELLOW
CREAT SERPL-MCNC: 1.2 MG/DL (ref 0.5–1)
EOSINOPHILS ABSOLUTE: 0.76 K/UL (ref 0.05–0.5)
EOSINOPHILS RELATIVE PERCENT: 10 % (ref 0–6)
GFR, ESTIMATED: 46 ML/MIN/1.73M2
GLUCOSE BLD-MCNC: 84 MG/DL (ref 74–99)
GLUCOSE URINE: NEGATIVE MG/DL
HBA1C MFR BLD: 5.2 % (ref 4–5.6)
HCT VFR BLD CALC: 39.8 % (ref 34–48)
HDLC SERPL-MCNC: 58 MG/DL
HEMOGLOBIN: 12.5 G/DL (ref 11.5–15.5)
IMMATURE GRANULOCYTES %: 0 % (ref 0–5)
IMMATURE GRANULOCYTES ABSOLUTE: 0.03 K/UL (ref 0–0.58)
KETONES, URINE: NEGATIVE MG/DL
LDL CHOLESTEROL: 99 MG/DL
LEUKOCYTE ESTERASE, URINE: ABNORMAL
LYMPHOCYTES ABSOLUTE: 1.23 K/UL (ref 1.5–4)
LYMPHOCYTES RELATIVE PERCENT: 16 % (ref 20–42)
MCH RBC QN AUTO: 30.3 PG (ref 26–35)
MCHC RBC AUTO-ENTMCNC: 31.4 G/DL (ref 32–34.5)
MCV RBC AUTO: 96.6 FL (ref 80–99.9)
MONOCYTES ABSOLUTE: 0.75 K/UL (ref 0.1–0.95)
MONOCYTES RELATIVE PERCENT: 10 % (ref 2–12)
NEUTROPHILS ABSOLUTE: 4.63 K/UL (ref 1.8–7.3)
NEUTROPHILS RELATIVE PERCENT: 62 % (ref 43–80)
NITRITE, URINE: NEGATIVE
PDW BLD-RTO: 14.2 % (ref 11.5–15)
PH, URINE: 6 (ref 5–8)
PLATELET # BLD: 201 K/UL (ref 130–450)
PMV BLD AUTO: 10.8 FL (ref 7–12)
POTASSIUM SERPL-SCNC: 5.2 MMOL/L (ref 3.5–5.1)
PROTEIN UA: NEGATIVE MG/DL
RBC # BLD: 4.12 M/UL (ref 3.5–5.5)
RBC UA: ABNORMAL /HPF
SODIUM BLD-SCNC: 143 MMOL/L (ref 136–145)
SPECIFIC GRAVITY UA: >1.03 (ref 1–1.03)
THYROXINE (T4): 4.9 UG/DL (ref 4.5–11.7)
TOTAL PROTEIN: 5.8 G/DL (ref 6.4–8.3)
TRIGL SERPL-MCNC: 122 MG/DL
TSH SERPL DL<=0.05 MIU/L-ACNC: 8.29 UIU/ML (ref 0.27–4.2)
TURBIDITY: CLEAR
URINE HGB: NEGATIVE
UROBILINOGEN, URINE: 0.2 EU/DL (ref 0–1)
VITAMIN B-12: 561 PG/ML (ref 232–1245)
VITAMIN D 25-HYDROXY: 62.9 NG/ML (ref 30–100)
VLDLC SERPL CALC-MCNC: 24 MG/DL
WBC # BLD: 7.5 K/UL (ref 4.5–11.5)
WBC UA: ABNORMAL /HPF

## 2025-04-30 ENCOUNTER — OFFICE VISIT (OUTPATIENT)
Dept: PRIMARY CARE CLINIC | Age: 80
End: 2025-04-30
Payer: MEDICARE

## 2025-04-30 VITALS
RESPIRATION RATE: 16 BRPM | WEIGHT: 135 LBS | TEMPERATURE: 97.8 F | BODY MASS INDEX: 24.69 KG/M2 | HEART RATE: 52 BPM | OXYGEN SATURATION: 99 %

## 2025-04-30 DIAGNOSIS — E55.9 VITAMIN D DEFICIENCY: ICD-10-CM

## 2025-04-30 DIAGNOSIS — I47.19 AVNRT (AV NODAL RE-ENTRY TACHYCARDIA): Chronic | ICD-10-CM

## 2025-04-30 DIAGNOSIS — N18.31 STAGE 3A CHRONIC KIDNEY DISEASE (HCC): Chronic | ICD-10-CM

## 2025-04-30 DIAGNOSIS — R79.89 ELEVATED SERUM CREATININE: ICD-10-CM

## 2025-04-30 DIAGNOSIS — E03.9 ACQUIRED HYPOTHYROIDISM: Primary | ICD-10-CM

## 2025-04-30 DIAGNOSIS — I10 ESSENTIAL HYPERTENSION: ICD-10-CM

## 2025-04-30 DIAGNOSIS — E53.8 VITAMIN B 12 DEFICIENCY: ICD-10-CM

## 2025-04-30 PROCEDURE — 1123F ACP DISCUSS/DSCN MKR DOCD: CPT | Performed by: FAMILY MEDICINE

## 2025-04-30 PROCEDURE — 1159F MED LIST DOCD IN RCRD: CPT | Performed by: FAMILY MEDICINE

## 2025-04-30 PROCEDURE — 99214 OFFICE O/P EST MOD 30 MIN: CPT | Performed by: FAMILY MEDICINE

## 2025-04-30 NOTE — PROGRESS NOTES
Lashonda Chi (:  1945) is a 79 y.o. female,    here for evaluation of the following chief complaint(s):  Pre-op Exam            Subjective   History of Present Illness  The patient presents for a 6-month checkup regarding thyroid, blood pressure, arthritis, tremor, back pain, chronic neck pain, neuropathy, hips, renal function, and osteoarthritis of the right knee.    She is scheduled for right total knee surgery with Dr. Germain using a robot on 2025. Weather-related discomfort has been experienced for the past 3 days, attributed to changes in atmospheric pressure. Despite taking Voltaren twice daily, no significant relief is reported. Movements have been adapted to accommodate scoliosis, and an active lifestyle is maintained, including yard work. Recently, Gatorade has been incorporated into the diet as a replacement for soda and coffee, which she believes has been beneficial.    A recent episode of sun sensitivity is reported. On , she was outside doing yard work and mowing the lawn. Although she was aware of her increasing sensitivity to the sun, it did not seem to bother her that day. However, by the evening, significant sleepiness was felt. In addition to the yard work, she was moving around in her flower plants and spraying them.    A brief period of 3 to 4 days without thyroid medication occurred prior to the last blood work.    Review of Systems:  Constitutional:  No fever, no fatigue, no chills, no headaches, no weight change  Dermatology:  No rash, no mole, no dry or sensitive skin  ENT:  No cough, no sore throat, no sinus pain, no runny nose, no ear pain  Cardiology:  No chest pain, no palpitations, no leg edema, no shortness of breath, no PND  Gastroenterology:  No dysphagia, no abdominal pain, no nausea, no vomiting, no constipation, no diarrhea, no heartburn  Musculoskeletal:  No joint pain, no leg cramps, no back pain, no muscle aches  Respiratory:  No shortness of breath,

## 2025-05-07 ENCOUNTER — HOSPITAL ENCOUNTER (OUTPATIENT)
Dept: CT IMAGING | Age: 80
Discharge: HOME | End: 2025-05-09
Payer: MEDICARE

## 2025-05-07 DIAGNOSIS — M17.11 ARTHRITIS OF RIGHT KNEE: ICD-10-CM

## 2025-05-07 PROCEDURE — 73700 CT LOWER EXTREMITY W/O DYE: CPT

## (undated) DEVICE — BLOCK BITE 60FR RUBBER ADLT DENTAL

## (undated) DEVICE — FORCEPS BX L240CM JAW DIA2.4MM ORNG L CAP W/ NDL DISP RAD

## (undated) DEVICE — FORCEPS BX OVL CUP FEN DISPOSABLE CAP L 160CM RAD JAW 4

## (undated) DEVICE — GRADUATE TRIANG MEASURE 1000ML BLK PRNT

## (undated) DEVICE — SPONGE GZ W4XL4IN RAYON POLY FILL CVR W/ NONWOVEN FAB